# Patient Record
Sex: MALE | Race: WHITE | Employment: OTHER | URBAN - METROPOLITAN AREA
[De-identification: names, ages, dates, MRNs, and addresses within clinical notes are randomized per-mention and may not be internally consistent; named-entity substitution may affect disease eponyms.]

---

## 2017-01-04 ENCOUNTER — TRANSFERRED RECORDS (OUTPATIENT)
Dept: HEALTH INFORMATION MANAGEMENT | Facility: CLINIC | Age: 57
End: 2017-01-04

## 2017-01-27 ENCOUNTER — OFFICE VISIT (OUTPATIENT)
Dept: FAMILY MEDICINE | Facility: CLINIC | Age: 57
End: 2017-01-27
Payer: COMMERCIAL

## 2017-01-27 VITALS
SYSTOLIC BLOOD PRESSURE: 134 MMHG | WEIGHT: 213 LBS | DIASTOLIC BLOOD PRESSURE: 80 MMHG | TEMPERATURE: 98.2 F | HEART RATE: 81 BPM | HEIGHT: 71 IN | BODY MASS INDEX: 29.82 KG/M2 | OXYGEN SATURATION: 96 %

## 2017-01-27 DIAGNOSIS — Z71.89 ADVANCED DIRECTIVES, COUNSELING/DISCUSSION: ICD-10-CM

## 2017-01-27 DIAGNOSIS — G90.522 COMPLEX REGIONAL PAIN SYNDROME TYPE 1 OF LEFT LOWER EXTREMITY: Primary | ICD-10-CM

## 2017-01-27 PROCEDURE — 99214 OFFICE O/P EST MOD 30 MIN: CPT | Performed by: FAMILY MEDICINE

## 2017-01-27 RX ORDER — ATORVASTATIN CALCIUM 40 MG/1
TABLET, FILM COATED ORAL
COMMUNITY
Start: 2016-12-20 | End: 2017-05-24

## 2017-01-27 RX ORDER — LATANOPROST 50 UG/ML
SOLUTION/ DROPS OPHTHALMIC
COMMUNITY
Start: 2015-11-11

## 2017-01-27 RX ORDER — ALFUZOSIN HYDROCHLORIDE 10 MG/1
10 TABLET, EXTENDED RELEASE ORAL
COMMUNITY
Start: 2016-11-29 | End: 2017-10-11

## 2017-01-27 RX ORDER — VENLAFAXINE HYDROCHLORIDE 37.5 MG/1
CAPSULE, EXTENDED RELEASE ORAL
Qty: 46 CAPSULE | Refills: 1 | Status: SHIPPED | OUTPATIENT
Start: 2017-01-27 | End: 2017-03-07 | Stop reason: SINTOL

## 2017-01-27 ASSESSMENT — ANXIETY QUESTIONNAIRES
6. BECOMING EASILY ANNOYED OR IRRITABLE: NEARLY EVERY DAY
2. NOT BEING ABLE TO STOP OR CONTROL WORRYING: NEARLY EVERY DAY
7. FEELING AFRAID AS IF SOMETHING AWFUL MIGHT HAPPEN: NEARLY EVERY DAY
2. NOT BEING ABLE TO STOP OR CONTROL WORRYING: NEARLY EVERY DAY
5. BEING SO RESTLESS THAT IT IS HARD TO SIT STILL: NEARLY EVERY DAY
GAD7 TOTAL SCORE: 21
IF YOU CHECKED OFF ANY PROBLEMS ON THIS QUESTIONNAIRE, HOW DIFFICULT HAVE THESE PROBLEMS MADE IT FOR YOU TO DO YOUR WORK, TAKE CARE OF THINGS AT HOME, OR GET ALONG WITH OTHER PEOPLE: VERY DIFFICULT
5. BEING SO RESTLESS THAT IT IS HARD TO SIT STILL: NEARLY EVERY DAY
1. FEELING NERVOUS, ANXIOUS, OR ON EDGE: NEARLY EVERY DAY
6. BECOMING EASILY ANNOYED OR IRRITABLE: NEARLY EVERY DAY
1. FEELING NERVOUS, ANXIOUS, OR ON EDGE: NEARLY EVERY DAY
3. WORRYING TOO MUCH ABOUT DIFFERENT THINGS: NEARLY EVERY DAY
3. WORRYING TOO MUCH ABOUT DIFFERENT THINGS: NEARLY EVERY DAY
7. FEELING AFRAID AS IF SOMETHING AWFUL MIGHT HAPPEN: NEARLY EVERY DAY
GAD7 TOTAL SCORE: 21

## 2017-01-27 ASSESSMENT — PATIENT HEALTH QUESTIONNAIRE - PHQ9
5. POOR APPETITE OR OVEREATING: NEARLY EVERY DAY
5. POOR APPETITE OR OVEREATING: NEARLY EVERY DAY

## 2017-01-27 NOTE — PROGRESS NOTES
SUBJECTIVE:                                                    Mata Poe is a 57 year old male who presents to clinic today for the following health issues:      Musculoskeletal problem/pain      Duration: 18 months -diagnosed RSD in 2005 following a surgery for Gee's neuroma.    Description  Location: left foot, ankle and lower leg. Has two pins in the ankle sill.  Entire foot and goes up back of leg.      Intensity:  severe    Accompanying signs and symptoms: radiation of pain to lower leg, swelling has improved but does get red, painful and tingly     History  Previous similar problem: YES- 2005  Previous evaluation:  Saw podiatrist 12/14/16 and had cryo surgery x2.  The most recent cyro surgery was 12/30 and since then pt c/o swelling, redness, burning and tingling    Precipitating or alleviating factors:  Trauma or overuse: YES- over use  Aggravating factors include: standing, walking, climbing stairs and overuse    Therapies tried and outcome: cryo -no improvement had seen podiatry on and off for years.  Has had sympathectomy as well without much relief.  Seemed worse after precedure.  Now pins and needles.   Walking is hard.  Cannot sleep.  Very frustrated.       Patient Active Problem List   Diagnosis     Causalgia of lower limb     Allergic rhinitis     Right Upper Lobe Changes     24 hr info given     Hyperlipidemia LDL goal <130  The 10-year ASCVD risk score ([place] DC Jr., et al., 2013) is: 9.2%    Values used to calculate the score:      Age: 57 years      Sex: Male      Is Non- : No      Diabetic: No      Tobacco smoker: No      Systolic Blood Pressure: 130 mmHg      Is Prescribed Antihypertensives: No      HDL Cholesterol: 33 mg/dL      Total Cholesterol: 179 mg/dL      Elevated fasting glucose     Obstructive Sleep Apnea (severe AHI=70)      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     Problem list and histories reviewed &  "adjusted, as indicated.  Additional history: as documented    Problem list, Medication list, Allergies, and Medical/Social/Surgical histories reviewed in EPIC and updated as appropriate.    1. Complex regional pain syndrome type 1 of left lower extremity    2. Advanced directives, counseling/discussion      PHQ-9 SCORE 1/27/2017 1/27/2017   Total Score 13 13     JACOB-7 SCORE 1/27/2017 1/27/2017   Total Score 21 21       Please see flow-sheet for specific details.     PMH: Updated and/or reviewed in chart.    PSH: Updated and/or reviewed in chart.    Family History: Updated and/or reviewed in chart.     ROS:  Constitutional, HEENT, cardiovascular, pulmonary, gi and gu systems are otherwise negative.    OBJECTIVE:                                                    /80  Pulse 81  Temp 98.2  F (36.8  C) (Tympanic)  Ht 5' 11\" (1.803 m)  Wt 213 lb (96.6 kg)  SpO2 96%  BMI 29.71 kg/m2  GENERAL: Pleasant and interactive.  Alert and oriented x 3.  No acute distress.  FEET: dominik sides very tender to light touch on entire foot and back of leg.  ; no swelling.   skin or vascular lesions.  Sensation is intact. Peripheral pulses are palpable. Toenails are normal.  PSYCH: Alert and oriented times 3; coherent speech, normal rate and volume, able to articulate logical thoughts, able to abstract reason, no tangential thoughts, no hallucinations or delusions  His affect is normal. Describes some anxiety.      Results for orders placed or performed during the hospital encounter of 08/11/14   Ribs XR, unilat 3 views + PA chest,  left    Addendum: 8/11/2014    Original dictation by Dr. Delcid. There is a left eighth rib  nondisplaced fracture. I spoke with Dr. Ruby regarding this finding  at 9 AM.    AWA CORCORAN MD      Narrative    XR RIBS & CHEST LT 3VW  8/11/2014 8:30 AM     HISTORY:  Fall at work, left posterior lateral chest wall trauma.    COMPARISON: None.    FINDINGS: No evidence for acute fracture.    LENKA DELCID, " MD      ASSESSMENT/PLAN:                                                        ICD-10-CM    1. Complex regional pain syndrome type 1 of left lower extremity G90.522 [facility] ([place] REFERRAL     venlafaxine (EFFEXOR-XR) 37.5 MG 24 hr capsule   2. Advanced directives, counseling/discussion Z71.89 [facility] REFERRAL       Care plan updated in chart for chronic problems.    Patient Instructions   Pain clinic.  Start on venlefaxine as instructed.  Follow-up with alta on 3/15/17.     Orders Placed This Encounter     [facility] REFERR[facility]     atorvastatin (LIPITOR) 40 MG tablet     alfuzosin (UROXATRAL) 10 MG 24 hr tablet     latanoprost (XALATAN) 0.005 % ophthalmic solution      Goals     None           Mariusz Ordonez MD

## 2017-01-27 NOTE — MR AVS SNAPSHOT
After Visit Summary   1/27/2017    Mata Poe    MRN: [id number]           Patient Information     Date Of Birth          1960        Visit Information        Provider Department      1/27/2017 3:30 PM Mariusz Ordonez MD [facility]        Today's Diagnoses     Complex regional pain syndrome type 1 of left lower extremity    -  1     Advanced directives, counseling/discussion           Care Instructions    Pain clinic.  Start on venlefaxine as instructed.  Follow-up with alta on 3/15/17.        Follow-ups after your visit        Additional Services     [facility] REFERRAL       Your provider has referred you to Advance Directive Counseling with our [facility].    Reason for Referral: Facilitate General Advance Care Planning    The [facility] Representative will call you to schedule your appointment, unless your appointment was already scheduled at your clinic.  You may also call the [facility] Representative at [phone] to schedule your appointment.            [facility] ([place]) REFERRAL       Your provider has referred you to the [facility].    Reason for Referral: Comprehensive Evaluation and Management    Please complete the following questions:    What is your diagnosis for the patient's pain?  Complex regional pan syndrome.     Do you have any specific questions for the pain specialist? Yes: candidate for repeat sympathectomy or block?      Are there any red flags that may impact the assessment or management of the patient? None    **ANY DIAGNOSTIC TESTS THAT ARE NOT IN EPIC SHOULD BE SENT TO THE PAIN CENTER**    Please note the Pre-Op Pain Consult must be scheduled 2-3 weeks prior to the patient's surgery.  Patient's trying to schedule within 2 weeks of surgery may not be accommodated.     Pre-Op Pain Consults are only good for 30 days.    REGARDING OPIOID MEDICATIONS:  We will always  address appropriateness of opioid pain medications, but we generally will not automatically take on a prescribing role. When we do take on prescribing of opioids for chronic pain, it is in collaboration with the referring physician for an intermediate period of time (months), with an expectation that the primary physician or provider will assume the prescribing role if medications are effective at stable doses with demonstrated compliance.  Therefore, please do not assume that your prescribing responsibilities end on the day of pain clinic consultation.  Is this agreeable to you? YES    For any questions, contact the [facility] at [phone].    Please be aware that coverage of these services is subject to the terms and limitations of your health insurance plan.  Call member services at your health plan with any benefit or coverage questions.      Please bring the following with you to your appointment:    (1) Any X-Rays, CTs or MRIs which have been performed.  Contact the facility where they were done to arrange for  prior to your scheduled appointment.    (2) List of current medications   (3) This referral request   (4) Any documents/labs given to you for this referral                  Who to contact     Normal or non-critical lab and imaging results will be communicated to you by MyChart, letter or phone within 4 business days after the clinic has received the results. If you do not hear from us within 7 days, please contact the clinic through [employer] or phone. If you have a critical or abnormal lab result, we will notify you by phone as soon as possible.  Submit refill requests through [employer] or call your pharmacy and they will forward the refill request to us. Please allow 3 business days for your refill to be completed.          If you need to speak with a  for additional information , please call: [phone]           Additional Information About Your  "Visit        MyChart Information     [facility] gives you secure access to your electronic health record. If you see a primary care provider, you can also send messages to your care team and make appointments. If you have questions, please call your primary care clinic.  If you do not have a primary care provider, please call [phone] and they will assist you.        Care EveryWhere ID     This is your Care EveryWhere ID. This could be used by other organizations to access your [facility] medical records  [id number]        Your Vitals Were     Pulse Temperature Height BMI (Body Mass Index) Pulse Oximetry       81 98.2  F (36.8  C) (Tympanic) 5' 11\" (1.803 m) 29.72 kg/m2 96%        Blood Pressure from Last 3 Encounters:   01/27/17 134/80   10/29/14 112/74   08/21/14 118/74    Weight from Last 3 Encounters:   01/27/17 213 lb (96.616 kg)   10/29/14 216 lb 3.2 oz (98.068 kg)   08/21/14 213 lb 12.8 oz (96.979 kg)              We Performed the Following     [facility] REFERRAL     [facility] ([place] REFERRAL          Today's Medication Changes          These changes are accurate as of: 1/27/17  4:40 PM.  If you have any questions, ask your nurse or doctor.               Start taking these medicines.        Dose/Directions    venlafaxine 37.5 MG 24 hr capsule   Commonly known as:  EFFEXOR-XR   Used for:  Complex regional pain syndrome type 1 of left lower extremity   Started by:  Mariusz Ordonez MD        Take 1 capsule daily for 14 days, then take 2 capsules daily.   Quantity:  46 capsule   Refills:  1            Where to get your medicines      These medications were sent to [facility] [address]  [address] [address]     Phone:  [phone]    - venlafaxine 37.5 MG 24 hr capsule             Primary Care Provider Office Phone # Fax #    Maribel Lancaster [phone]       [place] [employer] [address] [place] [address]        Thank you!     Thank you for choosing [facility] [place]  for your care. Our goal is always to provide you with excellent care. Hearing back from our patients is one way we can continue to improve our services. Please take a few minutes to complete the written survey that you may receive in the mail after your visit with us. Thank you!             Your Updated Medication List - Protect others around you: Learn how to safely use, store and throw away your medicines at www.disposemymeds.org.          This list is accurate as of: 1/27/17  4:40 PM.  Always use your most recent med list.                   Brand Name Dispense Instructions for use    alfuzosin 10 MG 24 hr tablet    UROXATRAL     Take 10 mg by mouth       ASPIRIN EC PO      Take 81 mg by mouth daily       atorvastatin 40 MG tablet    LIPITOR         latanoprost 0.005 % ophthalmic solution    XALATAN         order for [facility]      Respironics REMSTAR 60 Series Auto CPAP 7cm H2O, Airfit P10 nasal pillow mask w/medium pillows       traZODone 150 MG tablet    DESYREL    30 tablet    Take 1 tablet (150 mg) by mouth nightly as needed for sleep Needs appointment for further refills.       venlafaxine 37.5 MG 24 hr capsule    EFFEXOR-XR    46 capsule    Take 1 capsule daily for 14 days, then take 2 capsules daily.

## 2017-01-27 NOTE — NURSING NOTE
"Initial /80 mmHg  Pulse 81  Temp(Src) 98.2  F (36.8  C) (Tympanic)  Ht 5' 11\" (1.803 m)  Wt 213 lb (96.616 kg)  BMI 29.72 kg/m2  SpO2 96% Estimated body mass index is 29.72 kg/(m^2) as calculated from the following:    Height as of this encounter: 5' 11\" (1.803 m).    Weight as of this encounter: 213 lb (96.616 kg). .    "

## 2017-01-28 ASSESSMENT — PATIENT HEALTH QUESTIONNAIRE - PHQ9: SUM OF ALL RESPONSES TO PHQ QUESTIONS 1-9: 13

## 2017-01-28 ASSESSMENT — ANXIETY QUESTIONNAIRES: GAD7 TOTAL SCORE: 21

## 2017-01-30 ENCOUNTER — TRANSFERRED RECORDS (OUTPATIENT)
Dept: HEALTH INFORMATION MANAGEMENT | Facility: CLINIC | Age: 57
End: 2017-01-30

## 2017-01-30 ENCOUNTER — TELEPHONE (OUTPATIENT)
Dept: PALLIATIVE MEDICINE | Facility: CLINIC | Age: 57
End: 2017-01-30

## 2017-01-30 NOTE — Clinical Note
February 6, 2017    Mata Poe  [address] [place] [id number]    Dear Mata                                                                 Welcome to the [facility].  We are located on the 2nd floor ([address]) of the [facility], located at [address] [place] [address].    Your appointment at the [facility] has been scheduled on March 7th at 10:00am with Jenna Parrish NP.    At your first visit, you will meet your team of caregivers who will help you to develop pain management strategies that will last a lifetime. You will meet with our support staff to review your insurance information, and collect your co-payment if required by your insurance company. You will also meet with a medical pain specialist and care coordinator who will assess your pain and develop a plan of care for your successful pain rehabilitation. You should expect to spend 1-2 hours at your first visit with us. Usually, patients work with us for a period of 6-12 months, and eventually return to their primary doctor once their pain management has stabilized.      To help us make your visit go as smoothly as possible, please bring the following items with you on your visit:     Completed Pain Questionnaire enclosed in this packet.  If you do not bring the completed questionnaire, we may have to reschedule your appointment.  List of any medicines that you are currently taking or have been prescribed  Important NON-[facility] medical information such as medical records or tests results (X-rays, or laboratory tests)  Your health insurance card  Financial resources to cover your co-payment or balance due at the time of service (cash, personal check, Visa, and MasterCard are acceptable methods of payment)     Due to the demand for new patient evaluations, you must notify the scheduling department 48 hours in advance if you are not able to keep this appointment.  Failure to do so could affect your ability to reschedule with our clinic. Please be aware that we will not prescribe any medications at your first visit.     Please call [phone] with any questions regarding your appointment. We look forward to meeting you and working to address your health care needs.     Sincerely,    [facility]

## 2017-02-06 NOTE — TELEPHONE ENCOUNTER
Pain Management Center Referral      1. Confirmed address with patient? Yes  2. Confirmed phone number with patient? Yes  3. Confirmed referring provider? Yes  4. Is the PCP the same as the referring provider? Yes  5. Has the patient been to any previous pain clinics? No  (If yes, send DEBBIE with welcome letter)  6. Which insurance are we to bill for this appointment?  BCBS    7. Informed pt of cancellation (48 hour) policy? Yes    REGARDING OPIOID MEDICATIONS: We will always address appropriateness of opioid pain medications, but we generally will not automatically take on a prescribing role. When we do take on prescribing of opioids for chronic pain, it is in collaboration with the referring physician for an intermediate period of time (months), with an expectation that the primary physician or provider will assume the prescribing role if medications are effective at stable doses with demonstrated compliance. Therefore, please do not assume that your prescribing responsibilities end on the day of pain clinic consultation.  7. Informed pt of prescribing policy? Yes      8. Referring Provider: Maribel Lancaster

## 2017-02-09 ENCOUNTER — TELEPHONE (OUTPATIENT)
Dept: FAMILY MEDICINE | Facility: CLINIC | Age: 57
End: 2017-02-09

## 2017-02-09 NOTE — TELEPHONE ENCOUNTER
Pt is calling and states that he wants to talk to a nurse re His leg and hip pain, he was seen on 1/27/17 for this, and he states its not getting better. Please triage.     Shahla In[place], [place]

## 2017-02-10 ENCOUNTER — OFFICE VISIT (OUTPATIENT)
Dept: ORTHOPEDICS | Facility: CLINIC | Age: 57
End: 2017-02-10
Payer: COMMERCIAL

## 2017-02-10 ENCOUNTER — RADIANT APPOINTMENT (OUTPATIENT)
Dept: GENERAL RADIOLOGY | Facility: CLINIC | Age: 57
End: 2017-02-10
Attending: FAMILY MEDICINE
Payer: COMMERCIAL

## 2017-02-10 VITALS
HEIGHT: 71 IN | BODY MASS INDEX: 29.82 KG/M2 | DIASTOLIC BLOOD PRESSURE: 80 MMHG | SYSTOLIC BLOOD PRESSURE: 130 MMHG | WEIGHT: 213 LBS

## 2017-02-10 DIAGNOSIS — M25.511 CHRONIC RIGHT SHOULDER PAIN: ICD-10-CM

## 2017-02-10 DIAGNOSIS — G89.29 CHRONIC RIGHT SHOULDER PAIN: ICD-10-CM

## 2017-02-10 DIAGNOSIS — S43.109A: ICD-10-CM

## 2017-02-10 DIAGNOSIS — M79.661 PAIN OF RIGHT LOWER LEG: ICD-10-CM

## 2017-02-10 DIAGNOSIS — M79.661 PAIN OF RIGHT LOWER LEG: Primary | ICD-10-CM

## 2017-02-10 DIAGNOSIS — G90.522 COMPLEX REGIONAL PAIN SYNDROME TYPE 1 OF LEFT LOWER EXTREMITY: ICD-10-CM

## 2017-02-10 PROCEDURE — 99204 OFFICE O/P NEW MOD 45 MIN: CPT | Performed by: FAMILY MEDICINE

## 2017-02-10 PROCEDURE — 73610 X-RAY EXAM OF ANKLE: CPT | Mod: RT

## 2017-02-10 PROCEDURE — 73562 X-RAY EXAM OF KNEE 3: CPT | Mod: RT

## 2017-02-10 PROCEDURE — 73030 X-RAY EXAM OF SHOULDER: CPT | Mod: RT

## 2017-02-10 RX ORDER — GABAPENTIN 300 MG/1
300-600 CAPSULE ORAL AT BEDTIME
Qty: 90 CAPSULE | Refills: 1 | Status: SHIPPED | OUTPATIENT
Start: 2017-02-10 | End: 2017-03-15 | Stop reason: ALTCHOICE
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
## 2017-02-17 PROBLEM — F41.8 MIXED ANXIETY DEPRESSIVE DISORDER: Status: ACTIVE | Noted: 2017-02-17

## 2017-03-07 ENCOUNTER — OFFICE VISIT (OUTPATIENT)
Dept: PALLIATIVE MEDICINE | Facility: CLINIC | Age: 57
End: 2017-03-07
Payer: COMMERCIAL

## 2017-03-07 VITALS
DIASTOLIC BLOOD PRESSURE: 77 MMHG | HEART RATE: 74 BPM | BODY MASS INDEX: 30.94 KG/M2 | WEIGHT: 221 LBS | HEIGHT: 71 IN | SYSTOLIC BLOOD PRESSURE: 122 MMHG

## 2017-03-07 DIAGNOSIS — G89.29 CHRONIC BILATERAL LOW BACK PAIN WITHOUT SCIATICA: ICD-10-CM

## 2017-03-07 DIAGNOSIS — G90.522 COMPLEX REGIONAL PAIN SYNDROME TYPE 1 OF LEFT LOWER EXTREMITY: Primary | ICD-10-CM

## 2017-03-07 DIAGNOSIS — M25.50 MULTIPLE JOINT PAIN: ICD-10-CM

## 2017-03-07 DIAGNOSIS — G89.29 CHRONIC PAIN OF LEFT ANKLE: ICD-10-CM

## 2017-03-07 DIAGNOSIS — M79.18 MYOFASCIAL PAIN: ICD-10-CM

## 2017-03-07 DIAGNOSIS — M25.572 CHRONIC PAIN OF LEFT ANKLE: ICD-10-CM

## 2017-03-07 DIAGNOSIS — M54.50 CHRONIC BILATERAL LOW BACK PAIN WITHOUT SCIATICA: ICD-10-CM

## 2017-03-07 PROCEDURE — 99205 OFFICE O/P NEW HI 60 MIN: CPT | Performed by: NURSE PRACTITIONER

## 2017-03-07 RX ORDER — PREGABALIN 25 MG/1
CAPSULE ORAL
Qty: 90 CAPSULE | Refills: 0 | Status: SHIPPED | OUTPATIENT
Start: 2017-03-07 | End: 2017-04-14

## 2017-03-07 ASSESSMENT — PAIN SCALES - GENERAL: PAINLEVEL: WORST PAIN (10)

## 2017-03-07 NOTE — MR AVS SNAPSHOT
After Visit Summary   3/7/2017    Mata Poe    MRN: [id number]           Patient Information     Date Of Birth          1960        Visit Information        Provider Department      3/7/2017 10:00 AM Jenna Parrish APRN [facility]        Today's Diagnoses     Complex regional pain syndrome type 1 of left lower extremity    -  1    Chronic pain of left ankle        Multiple joint pain        Myofascial pain        Chronic bilateral low back pain without sciatica          Care Instructions    PLAN:  1. Schedule with pain management PHYSICAL THERAPY   2. Consider HEALTH PSYCHOLOGY   3. Medications:   1. Start Lyrica as prescribed, stop gabapentin the day before you begin Lyrica  2. I don't recommend opiates for this chronic pain  4. Procedures: schedule left lumbar sympathetic nerve block, our office will call you  5. Follow-up with me in 8 weeks.      ----------------------------------------------------------------  Nurse Triage line:  [phone]   Call this number with any questions or concerns. You may leave a detailed message anytime. Calls are typically returned Monday through Friday between 8 AM and 4:30 PM. We usually get back to you within 2 business days depending on the issue/request.       Medication refills:    For non-narcotic medications, call your pharmacy directly to request a refill. The pharmacy will contact the [facility] for authorization. Please allow 3-4 days for these refills to be processed.     For narcotic refills, call the nurse triage line or send a [employer] message. Please contact us 7-10 days before your refill is due. The message MUST include the name of the specific medication(s) requested and how you would like to receive the prescription(s). The options are as follows:    Pain Clinic staff can mail the prescription to your pharmacy. Please tell us the name of the pharmacy.    You may pick the prescription up at the Pain  Clinic (tell us the location) or during a clinic visit with your pain provider    Pain Clinic staff can deliver the prescription to the [place] pharmacy in the clinic building. Please tell us the location.      Scheduling number: [phone].  Call this number to schedule or change appointments.    We believe regular attendance is key to your success in our program.    Any time you are unable to keep your appointment we ask that you call us at least 24 hours in advance to let us know. This will allow us to offer the appointment time to another patient.             Follow-ups after your visit        Additional Services     PAIN INJECTION EVAL/TREAT/FOLLOW UP           PAIN PT EVAL AND TREAT                 Your next 10 appointments already scheduled     Mar 15, 2017 11:00 AM CDT   Office Visit with Mariusz Ordonez MD   [facility] [address] ([facility] [address])    [address] [address] [phone]           Bring a current list of meds and any records pertaining to this visit.  For Physicals, please bring immunization records and any forms needing to be filled out.  Please arrive 10 minutes early to complete paperwork.              Who to contact     If you have questions or need follow up information about today's clinic visit or your schedule please contact [facility] directly at [phone].  Normal or non-critical lab and imaging results will be communicated to you by MyChart, letter or phone within 4 business days after the clinic has received the results. If you do not hear from us within 7 days, please contact the clinic through MyChart or phone. If you have a critical or abnormal lab result, we will notify you by phone as soon as possible.  Submit refill requests through [employer] or call your pharmacy and they will forward the refill request to us. Please allow 3 business days for your refill to be completed.          Additional Information About Your  "Visit        [facility] [place] Information     [employer] gives you secure access to your electronic health record. If you see a primary care provider, you can also send messages to your care team and make appointments. If you have questions, please call your primary care clinic.  If you do not have a primary care provider, please call [phone] and they will assist you.        Care EveryWhere ID     This is your Care EveryWhere ID. This could be used by other organizations to access your [facility] medical records  [id number]        Your Vitals Were     Pulse Height BMI (Body Mass Index)             74 1.803 m (5' 11\") 30.82 kg/m2          Blood Pressure from Last 3 Encounters:   03/07/17 122/77   02/10/17 130/80   01/27/17 134/80    Weight from Last 3 Encounters:   03/07/17 100.2 kg (221 lb)   02/10/17 96.6 kg (213 lb)   01/27/17 96.6 kg (213 lb)              We Performed the Following     PAIN INJECTION EVAL/TREAT/FOLLOW UP     PAIN PT EVAL AND TREAT          Today's Medication Changes          These changes are accurate as of: 3/7/17 11:24 AM.  If you have any questions, ask your nurse or doctor.               Start taking these medicines.        Dose/Directions    pregabalin 25 MG capsule   Commonly known as:  LYRICA   Used for:  Complex regional pain syndrome type 1 of left lower extremity, Chronic pain of left ankle, Multiple joint pain, Myofascial pain, Chronic bilateral low back pain without sciatica        Take 25mg at bedtime for 7 days, then take 25mg twice daily for 7 days, then take 25mg in the morning and 50mg at bedtime. Stop gabapentin the day before you start Lyrica   Quantity:  90 capsule   Refills:  0         Stop taking these medicines if you haven't already. Please contact your care team if you have questions.     venlafaxine 37.5 MG 24 hr capsule   Commonly known as:  EFFEXOR-XR                Where to get your medicines      Some of these will need a paper prescription and others can be bought over the " counter.  Ask your nurse if you have questions.     Bring a paper prescription for each of these medications     pregabalin 25 MG capsule                Primary Care Provider Office Phone # Fax #    Maribel Lancaster [phone]       [place] [facility] [address] [place] [address]        Thank you!     Thank you for choosing [facility]  for your care. Our goal is always to provide you with excellent care. Hearing back from our patients is one way we can continue to improve our services. Please take a few minutes to complete the written survey that you may receive in the mail after your visit with us. Thank you!             Your Updated Medication List - Protect others around you: Learn how to safely use, store and throw away your medicines at www.disposemymeds.org.          This list is accurate as of: 3/7/17 11:24 AM.  Always use your most recent med list.                   Brand Name Dispense Instructions for use    alfuzosin 10 MG 24 hr tablet    UROXATRAL     Take 10 mg by mouth       ASPIRIN EC PO      Take 81 mg by mouth daily       atorvastatin 40 MG tablet    LIPITOR         gabapentin 300 MG capsule    NEURONTIN    90 capsule    Take 1-2 capsules (300-600 mg) by mouth At Bedtime Take one capsule at night for up to 3 days before adding a second capsule       latanoprost 0.005 % ophthalmic solution    XALATAN         order for [facility]      Respironics REMSTAR 60 Series Auto CPAP 7cm H2O, Airfit P10 nasal pillow mask w/medium pillows       pregabalin 25 MG capsule    LYRICA    90 capsule    Take 25mg at bedtime for 7 days, then take 25mg twice daily for 7 days, then take 25mg in the morning and 50mg at bedtime. Stop gabapentin the day before you start Lyrica       sertraline 50 MG tablet    ZOLOFT    30 tablet    Take 1/2 tablet (25 mg) for 1-2 weeks, then increase to 1 tablet orally daily       traZODone 150 MG tablet    DESYREL    30 tablet    Take 1 tablet (150 mg) by  mouth nightly as needed for sleep Needs appointment for further refills.

## 2017-03-07 NOTE — PATIENT INSTRUCTIONS
PLAN:  1. Schedule with pain management PHYSICAL THERAPY   2. Consider HEALTH PSYCHOLOGY   3. Medications:   1. Start Lyrica as prescribed, stop gabapentin the day before you begin Lyrica  2. I don't recommend opiates for this chronic pain  4. Procedures: schedule left lumbar sympathetic nerve block, our office will call you  5. Follow-up with me in 8 weeks.      ----------------------------------------------------------------  Nurse Triage line:  [phone]   Call this number with any questions or concerns. You may leave a detailed message anytime. Calls are typically returned Monday through Friday between 8 AM and 4:30 PM. We usually get back to you within 2 business days depending on the issue/request.       Medication refills:    For non-narcotic medications, call your pharmacy directly to request a refill. The pharmacy will contact the [facility] Management Center for authorization. Please allow 3-4 days for these refills to be processed.     For narcotic refills, call the nurse triage line or send a [employer] message. Please contact us 7-10 days before your refill is due. The message MUST include the name of the specific medication(s) requested and how you would like to receive the prescription(s). The options are as follows:    Pain Clinic staff can mail the prescription to your pharmacy. Please tell us the name of the pharmacy.    You may pick the prescription up at the Pain Clinic (tell us the location) or during a clinic visit with your pain provider    Pain Clinic staff can deliver the prescription to the [place] pharmacy in the clinic building. Please tell us the location.      Scheduling number: [phone].  Call this number to schedule or change appointments.    We believe regular attendance is key to your success in our program.    Any time you are unable to keep your appointment we ask that you call us at least 24 hours in advance to let us know. This will allow us to offer the appointment time to  another patient.
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
## 2017-03-08 ENCOUNTER — TELEPHONE (OUTPATIENT)
Dept: PALLIATIVE MEDICINE | Facility: CLINIC | Age: 57
End: 2017-03-08

## 2017-03-08 ASSESSMENT — PATIENT HEALTH QUESTIONNAIRE - PHQ9: SUM OF ALL RESPONSES TO PHQ QUESTIONS 1-9: 11

## 2017-03-08 NOTE — TELEPHONE ENCOUNTER
Called patient to schedule LSNB and patient would like to think about it before scheduling.     Mana Durand    [facility]

## 2017-03-13 NOTE — TELEPHONE ENCOUNTER
Pre-screening questions for Radiology Injections:    Injection to be done at which interventional clinic site? [facility]    Procedure ordered by Jenna Parrish    Procedure ordered? Lumbar Sympathetic Nerve Block    What insurance would patient like us to bill for this procedure? BCBS      Worker's comp-Any injection DO NOT SCHEDULE and route to Ibeth Torrez.      [employer] insurance - If scheduling an SI joint injection DO NOT SCHEDULE and route Ibeth Torrez.    [employer]- MBB's must be scheduled at LEAST two weeks apart      [employer] - Any injection besides hip/shoulder/knee joint DO NOT SCHEDULE and route to Ibeth Torrez. She will obtain PA and call pt back to schedule procedure or notify pt of denial.     Is an  needed? No     Patient has a drive home? (mandatory) YES:      Is patient taking any blood thinners (plavix, coumadin, jantoven, warfarin, heparin, pradaxa or dabigatran )? No   (If so, do not schedule, contact RN and/or MD)     Is patient taking any aspirin products? Yes - Pt takes 81mg daily; instructed to hold 6 day(s) prior to procedure.    (If more than 325mg/day do not schedule; Contact RN/MD. For all non-cervical interventional procedures if patient is taking MORE than 325mg/day, limit aspirin to 81-325mg/day x 1 week. No hold required day of procedure.  For CERVICAL procedures, hold all aspirin products for 6 days.)      Does the patient have a bleeding or clotting disorder? No   (If yes, okay to schedule, but contact RN/MD).  **For any patients with platelet count <100, must be forwarded to provider**    Is patient diabetic?  No  If YES, have them bring their glucometer.    Does patient have an active infection or treated for one within the past week? No     Is patient currently taking any antibiotics?  No   For patients on chronic, preventative, or prophylactic antibiotics, procedures can be scheduled.   For patients on antibiotics for active  or recent infection:  Mildred Vazquez [place], [place]-antibiotic course must have been completed for 4 days  Ophelia Rivas-antibiotic course must have been completed for 7 days    Is patient currently taking any steroid medications? (i.e. Prednisone, Medrol)  No   For patients on steroid medications:  Mildred Vazquez [place], [place]-steroid course must have been completed for 4 days  Ophelia Rivas-steroid course must have been completed for 7 days  Review with patient:  If you are started on any steroids or antibiotics between now and your appointment, you must contact us because it may affect our ability to perform your procedure Informed    Is patient actively being treated for cancer or immunocompromised, including the spleen having been removed? No  **For Dr. Keane patients without spleens should have the chart sent to her**  (If YES, do NOT schedule and route to RN)    Are you able to get on and off an exam table with minimal or no assistance? Yes  (If NO, do NOT schedule and route to RN)  Are you able to roll over and lay on your stomach with minimal or no assistance? Yes  (If NO, do NOT schedule and route to RN)         Any allergies to contrast dye, iodine, shellfish, or numbing and steroid medications? No  (If so, inform nursing and note in scheduling comments.)    Allergies: No known allergies      Any chance of pregnancy?Not Applicable    Has the patient had a flu shot or any other vaccinations within 7 days before or after the procedure.  No       Does patient have an MRI/CT?  Not Applicable  (SI joint, hip injections, lumbar sympathetic blocks, and stellate ganglion blocks do not require an MRI)    If so, was it done at [place]? No      If not, where was it done? N/A     Was the MRI done w/in the last 3 years?  NA   If MRI was not done at [place], [facility] or [facility] do NOT schedule. Route to nursing.  (If pt has disc the injection can be scheduled but pt has to bring  disc to appt. If they show up w/out disc the injection cannot be done)    Reminders (please tell patient if applicable):       Instructed pt to arrive 30 minutes early for IV start if this is for a cervical procedure, ALL sympathetic (stellate ganglion, hypogastric, or lumbar sympathetic block) and all sedation procedures (RFA, spinal cord stimulation trials).  YES: Informed    -IVs are not routinely placed for [place] and [place] cervical case       If NPO for sedation, informed patient that it is okay to take medications with sips of water (except if they are to hold blood thinners).  YES: Informed   *DO take blood pressure medication if it is prescribed*      If this is for a cervical ISABELLE, informed patient that aspirin needs to be held for 6 days.   YES: Informed      Do not schedule procedures requiring IV placement in the first appointment of the day or first appointment after lunch         For patients 85 or older we recommend having an adult stay w/ them for the remainder of the day.         Does the patient have any questions?  NO      Mana Durand  [facility]

## 2017-03-15 ENCOUNTER — OFFICE VISIT (OUTPATIENT)
Dept: FAMILY MEDICINE | Facility: CLINIC | Age: 57
End: 2017-03-15
Payer: COMMERCIAL

## 2017-03-15 VITALS
DIASTOLIC BLOOD PRESSURE: 78 MMHG | HEIGHT: 71 IN | WEIGHT: 217.8 LBS | TEMPERATURE: 97.2 F | HEART RATE: 80 BPM | SYSTOLIC BLOOD PRESSURE: 114 MMHG | BODY MASS INDEX: 30.49 KG/M2

## 2017-03-15 DIAGNOSIS — Z11.59 NEED FOR HEPATITIS C SCREENING TEST: ICD-10-CM

## 2017-03-15 DIAGNOSIS — Z13.220 LIPID SCREENING: ICD-10-CM

## 2017-03-15 DIAGNOSIS — G57.72 CAUSALGIA OF LOWER LIMB, LEFT: ICD-10-CM

## 2017-03-15 DIAGNOSIS — Z12.5 SCREENING FOR PROSTATE CANCER: ICD-10-CM

## 2017-03-15 DIAGNOSIS — E78.5 HYPERLIPIDEMIA LDL GOAL <130: Primary | ICD-10-CM

## 2017-03-15 DIAGNOSIS — F41.8 MIXED ANXIETY DEPRESSIVE DISORDER: ICD-10-CM

## 2017-03-15 PROCEDURE — 99214 OFFICE O/P EST MOD 30 MIN: CPT | Performed by: FAMILY MEDICINE

## 2017-03-15 RX ORDER — LORAZEPAM 1 MG/1
.5-1 TABLET ORAL EVERY 8 HOURS PRN
Qty: 30 TABLET | Refills: 0 | Status: SHIPPED | OUTPATIENT
Start: 2017-03-15 | End: 2017-04-12

## 2017-03-15 ASSESSMENT — ANXIETY QUESTIONNAIRES
2. NOT BEING ABLE TO STOP OR CONTROL WORRYING: NEARLY EVERY DAY
5. BEING SO RESTLESS THAT IT IS HARD TO SIT STILL: MORE THAN HALF THE DAYS
GAD7 TOTAL SCORE: 17
6. BECOMING EASILY ANNOYED OR IRRITABLE: SEVERAL DAYS
7. FEELING AFRAID AS IF SOMETHING AWFUL MIGHT HAPPEN: NEARLY EVERY DAY
1. FEELING NERVOUS, ANXIOUS, OR ON EDGE: NEARLY EVERY DAY
3. WORRYING TOO MUCH ABOUT DIFFERENT THINGS: NEARLY EVERY DAY

## 2017-03-15 ASSESSMENT — PATIENT HEALTH QUESTIONNAIRE - PHQ9: 5. POOR APPETITE OR OVEREATING: MORE THAN HALF THE DAYS

## 2017-03-15 NOTE — NURSING NOTE
"Chief Complaint   Patient presents with     Musculoskeletal Problem     Left foot pain/Lyrica/Neurontin follow up.      Anxiety     Zoloft follow up     Depression     Blood Draw     Would like Lipid and PSA drawn       Initial /78 (BP Location: Left arm, Patient Position: Chair, Cuff Size: Adult Large)  Pulse 80  Temp 97.2  F (36.2  C) (Tympanic)  Ht 5' 11\" (1.803 m)  Wt 217 lb 12.8 oz (98.8 kg)  BMI 30.38 kg/m2 Estimated body mass index is 30.38 kg/(m^2) as calculated from the following:    Height as of this encounter: 5' 11\" (1.803 m).    Weight as of this encounter: 217 lb 12.8 oz (98.8 kg).  Medication Reconciliation: complete   Danielle Zamora LPN    "

## 2017-03-15 NOTE — PROGRESS NOTES
SUBJECTIVE:                                                    Mata Poe is a 57 year old male who presents to clinic today for the following health issues:      Depression and Anxiety Follow-Up    Status since last visit: Worsened     Other associated symptoms: Foot and body pain, sleep issues    Complicating factors:     Significant life event: Yes-  Pain is getting worse     Current substance abuse: Alcohol - 8 drinks per week    Venlafaxine caused agitation and anxiety was worse.  Untilerable mood swings.    Zoloft change seems to be causing the same issues.      Poor sleep and lots of anxiety.  Keeping him awake at night.     Using trazodone at night.    Would like stop medications and talk to Félix, his therapist.     PAIN:    Didn't feel that neurontin helped.      Pain has his changing to Lyrica.    Scheduling an injection.    PHQ-9 SCORE 1/27/2017 3/7/2017 3/15/2017   Total Score 13 11 7     JACOB-7 SCORE 1/27/2017 1/27/2017 3/15/2017   Total Score 21 21 17        PHQ-9  English      PHQ-9   Any Language     GAD7       Amount of exercise or physical activity: 2-3 days/week for an average of 15 minutes    Problems taking medications regularly: No    Medication side effects: not sure  Diet: regular (no restrictions)        Problem list and histories reviewed & adjusted, as indicated.  Additional history: as documented    Labs reviewed in EPIC    Reviewed and updated as needed this visit by clinical staff  Tobacco  Allergies  Meds  Med Hx  Surg Hx  Fam Hx  Soc Hx      Reviewed and updated as needed this visit by Provider         1. Hyperlipidemia LDL goal <130 - due for follow-up.    2. Mixed anxiety depressive disorder    3. 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 Is very frustrated and debilitated by this.    4. Lipid screening - due for recheck on this.    5. Screening for prostate cancer - due for follow-up. Discussed screening.    6. Need for hepatitis C  "screening test        PMH: Updated and/or reviewed in chart.    PSH: Updated and/or reviewed in chart.    Family History: Updated and/or reviewed in chart.     ROS:  Constitutional, HEENT, cardiovascular, pulmonary, gi and gu systems are otherwise negative.    OBJECTIVE:                                                    /78 (BP Location: Left arm, Patient Position: Chair, Cuff Size: Adult Large)  Pulse 80  Temp 97.2  F (36.2  C) (Tympanic)  Ht 5' 11\" (1.803 m)  Wt 217 lb 12.8 oz (98.8 kg)  BMI 30.38 kg/m2  GENERAL: Pleasant and interactive.  Alert and oriented x 3.  No acute distress.  PSYCH: Alert. Cooperative.  Mildly agitated.  Mood is anxious and affect is consistant.  Denies suicidal or homicidal ideation.  HEENT: Normocephalic, atraumatic. PEERRLA, EOMI.  Scleras, lids and conjunctivae normal. Pinnas, canals and TM's clear.  Nose and oropharynx moist and clear.  NECK: supple and free of adenopathy or masses, the thyroid is normal without enlargement or nodules.  HEART:  S1 and S2 normal, no murmurs, clicks, gallops or rubs. Regular rate and rhythm.    PHQ-9 SCORE 1/27/2017 3/7/2017 3/15/2017   Total Score 13 11 7     JACOB-7 SCORE 1/27/2017 1/27/2017 3/15/2017   Total Score 21 21 17       Please see flow-sheet for specific details.   Results for orders placed or performed in visit on 02/10/17   XR Knee Bilateral 1/2 Views    Narrative    XR KNEE RT 1 /2 VW, XR KNEE BILATERAL 1/2 VW  2/10/2017 5:00 PM    HISTORY:  Pain in right lower leg    COMPARISON:  None.      Impression    IMPRESSION:  Bilateral AP and sunrise views. Lateral right knee. 5  images. No significant osseous degenerative changes. Mild bilateral  medial compartment narrowing. Tiny posterior superior right patellar  spur.     FLASH MORRIS MD      ASSESSMENT/PLAN:                                                        ICD-10-CM    1. Hyperlipidemia LDL goal <130 E78.5 Lipid Profile with reflex to direct LDL   2. Mixed anxiety depressive " disorder F41.8 LORazepam (ATIVAN) 1 MG tablet   3. Causalgia of lower limb, left G57.72 LORazepam (ATIVAN) 1 MG tablet   4. Lipid screening Z13.220 Lipid panel reflex to direct LDL   5. Screening for prostate cancer Z12.5 Prostate spec antigen screen   6. Need for hepatitis C screening test Z11.59 Hepatitis C Screen Reflex to HCV RNA Quant and Genotype       Care plan updated in chart for chronic problems.    Patient Instructions     Stop the zoloft.    Stop the gabapentin and start the Lyrica the next day as directed.    Ok to use trazodone for sleep.    See sleep tips below... If not helping with sleep, we can get an insomnia consult with the sleep clinic.    Will talk to Félix but will hold off on new psychiatric medications for now.    Follow-up with pain recommendations about the injection.     General recommendations for sleep problems (Insomnia)  Allow 2-4 weeks to see results   Establish a regular sleep schedule   Go to bed at same time each night   Get up at same time each day   Avoid sleeping-in on Sunday morning   Cut down time in bed (if not asleep, get up)   Avoid trying to force yourself to sleep   Use your bed only for sleep and sex   Do not read or watch Television in bed   Make the bedroom comfortable   Keep temperature in your bedroom comfortable   Keep bedroom quiet when sleeping   Consider ear plugs (silicon)   Keep bedroom dark enough   Use dark blinds or wear an eye mask if needed   Relax at bedtime   Relax each muscle group individually   Begin with your feet   Work toward your head   Deal with your worries before bedtime   Set aside a worry time for 30 minutes earlier   Listen to relaxation tapes   Classical Music or Nature sounds   Imagine a tranquil scene (e.g. waterfall or beach)   Back Massage   Gentle 5-minute back rub prior to bedtime   Perform measures to make you tired at bedtime   Get regular Exercise each day (6 hours before bedtime)   Take medications only as directed   Eat a light  "bedtime snack or warm drink   Warm milk   Warm herbal tea (non-caffeinated)   Special Therapy Measures   Sleep Restriction Therapy   Limit time in bed for 15 minutes more than sleep   Do not set limit under 4 hours   Increase time by 15 minutes per effective sleep trial   Effective sleep suggests >90% of bedtime asleep   Stimulus Control   Do not lie awake for more than 30 minutes   Get out of bed and perform a quiet activity   Return to bed when sleepy   Repeat as many times per night as needed   No Napping during day   Things to avoid   Do not Exercise just before bedtime   No overstimulating activities just before bed   No competitive games before bedtime   No exciting Television programs before bedtime   Avoid caffeine after lunchtime   Avoid chocolate   Avoid coffee, tea, or soda   Do not use alcohol to induce sleep (worsens Insomnia)   Do not take someone else's sleeping pills   Do not look at the clock when awakening   Do not turn on light when getting up to use bathroom   Read the book \"Say Good Night To Insomnia\"         Orders Placed This Encounter     Lipid Profile with reflex to direct LDL     Lipid panel reflex to direct LDL     Hepatitis C Screen Reflex to HCV RNA Quant and Genotype     Prostate spec antigen screen     LORazepam (ATIVAN) 1 MG tablet      Goals     None           Mariusz Ordonez MD      "

## 2017-03-15 NOTE — MR AVS SNAPSHOT
After Visit Summary   3/15/2017    Mata Poe    MRN: [id number]           Patient Information     Date Of Birth          1960        Visit Information        Provider Department      3/15/2017 11:00 AM Mariusz Ordonez MD [facility]        Today's Diagnoses     Hyperlipidemia LDL goal <130    -  1    Mixed anxiety depressive disorder        Causalgia of lower limb, left          Care Instructions      Stop the zoloft.    Stop the gabapentin and start the Lyrica the next day as directed.    Ok to use trazodone for sleep.    See sleep tips below... If not helping with sleep, we can get an insomnia consult with the sleep clinic.    Will talk to Félix but will hold off on new psychiatric medications for now.    Follow-up with pain recommendations about the injection.     General recommendations for sleep problems (Insomnia)  Allow 2-4 weeks to see results   Establish a regular sleep schedule   Go to bed at same time each night   Get up at same time each day   Avoid sleeping-in on Sunday morning   Cut down time in bed (if not asleep, get up)   Avoid trying to force yourself to sleep   Use your bed only for sleep and sex   Do not read or watch Television in bed   Make the bedroom comfortable   Keep temperature in your bedroom comfortable   Keep bedroom quiet when sleeping   Consider ear plugs (silicon)   Keep bedroom dark enough   Use dark blinds or wear an eye mask if needed   Relax at bedtime   Relax each muscle group individually   Begin with your feet   Work toward your head   Deal with your worries before bedtime   Set aside a worry time for 30 minutes earlier   Listen to relaxation tapes   Classical Music or Nature sounds   Imagine a tranquil scene (e.g. waterfall or beach)   Back Massage   Gentle 5-minute back rub prior to bedtime   Perform measures to make you tired at bedtime   Get regular Exercise each day (6 hours before bedtime)   Take medications only as  "directed   Eat a light bedtime snack or warm drink   Warm milk   Warm herbal tea (non-caffeinated)   Special Therapy Measures   Sleep Restriction Therapy   Limit time in bed for 15 minutes more than sleep   Do not set limit under 4 hours   Increase time by 15 minutes per effective sleep trial   Effective sleep suggests >90% of bedtime asleep   Stimulus Control   Do not lie awake for more than 30 minutes   Get out of bed and perform a quiet activity   Return to bed when sleepy   Repeat as many times per night as needed   No Napping during day   Things to avoid   Do not Exercise just before bedtime   No overstimulating activities just before bed   No competitive games before bedtime   No exciting Television programs before bedtime   Avoid caffeine after lunchtime   Avoid chocolate   Avoid coffee, tea, or soda   Do not use alcohol to induce sleep (worsens Insomnia)   Do not take someone else's sleeping pills   Do not look at the clock when awakening   Do not turn on light when getting up to use bathroom   Read the book \"Say Good Night To Insomnia\"            Follow-ups after your visit        Follow-up notes from your care team     Return in about 4 weeks (around 4/12/2017).      Your next 10 appointments already scheduled     Mar 20, 2017  1:00 PM CDT   New Visit with Harper Carrillo, PT   [facility] ([facility] [place])    [address]  [place] [address]   [phone]            Apr 11, 2017 10:15 AM CDT   Radiology Injections with Mariusz Dallas MD   [facility] ([facility])    [address]  [place] [address]   [phone]            May 16, 2017  1:30 PM CDT   Return Visit with Jenna Parrish APRN [facility] ([facility])    [address] [address]   [phone]              Future tests that were ordered for you today     Open Future Orders  " "      Priority Expected Expires Ordered    Lipid Profile with reflex to direct LDL Routine 3/15/2017 3/15/2018 3/15/2017            Who to contact     Normal or non-critical lab and imaging results will be communicated to you by [employer], letter or phone within 4 business days after the clinic has received the results. If you do not hear from us within 7 days, please contact the clinic through [employer] or phone. If you have a critical or abnormal lab result, we will notify you by phone as soon as possible.  Submit refill requests through [employer] or call your pharmacy and they will forward the refill request to us. Please allow 3 business days for your refill to be completed.          If you need to speak with a  for additional information , please call: [phone]           Additional Information About Your Visit        [employer] Information     [employer] gives you secure access to your electronic health record. If you see a primary care provider, you can also send messages to your care team and make appointments. If you have questions, please call your primary care clinic.  If you do not have a primary care provider, please call [phone] and they will assist you.        Care EveryWhere ID     This is your Care EveryWhere ID. This could be used by other organizations to access your [facility] medical records  [id number]        Your Vitals Were     Pulse Temperature Height BMI (Body Mass Index)          80 97.2  F (36.2  C) (Tympanic) 5' 11\" (1.803 m) 30.38 kg/m2         Blood Pressure from Last 3 Encounters:   03/15/17 114/78   03/07/17 122/77   02/10/17 130/80    Weight from Last 3 Encounters:   03/15/17 217 lb 12.8 oz (98.8 kg)   03/07/17 221 lb (100.2 kg)   02/10/17 213 lb (96.6 kg)                 Today's Medication Changes          These changes are accurate as of: 3/15/17 11:55 AM.  If you have any questions, ask your nurse or doctor.               Start taking these medicines.        " Dose/Directions    LORazepam 1 MG tablet   Commonly known as:  ATIVAN   Used for:  Causalgia of lower limb, left, Mixed anxiety depressive disorder   Started by:  Mariusz Ordonez MD        Dose:  0.5-1 mg   Take 0.5-1 tablets (0.5-1 mg) by mouth every 8 hours as needed for anxiety   Quantity:  30 tablet   Refills:  0         Stop taking these medicines if you haven't already. Please contact your care team if you have questions.     gabapentin 300 MG capsule   Commonly known as:  NEURONTIN   Stopped by:  Mariusz Ordonez MD           sertraline 50 MG tablet   Commonly known as:  ZOLOFT   Stopped by:  Mariusz Ordonez MD                Where to get your medicines      Some of these will need a paper prescription and others can be bought over the counter.  Ask your nurse if you have questions.     Bring a paper prescription for each of these medications     LORazepam 1 MG tablet                Primary Care Provider Office Phone # Fax #    Maribel [place] [phone]       [facility] [address]   [facility] [facility] [address]        Thank you!     Thank you for choosing [facility]  for your care. Our goal is always to provide you with excellent care. Hearing back from our patients is one way we can continue to improve our services. Please take a few minutes to complete the written survey that you may receive in the mail after your visit with us. Thank you!             Your Updated Medication List - Protect others around you: Learn how to safely use, store and throw away your medicines at www.disposemymeds.org.          This list is accurate as of: 3/15/17 11:55 AM.  Always use your most recent med list.                   Brand Name Dispense Instructions for use    alfuzosin 10 MG 24 hr tablet    UROXATRAL     Take 10 mg by mouth       ASPIRIN EC PO      Take 81 mg by mouth daily       atorvastatin 40 MG tablet    LIPITOR         latanoprost 0.005 % ophthalmic solution     XALATAN         LORazepam 1 MG tablet    ATIVAN    30 tablet    Take 0.5-1 tablets (0.5-1 mg) by mouth every 8 hours as needed for anxiety       order for [facility]      Respironics REMSTAR 60 Series Auto CPAP 7cm H2O, Airfit P10 nasal pillow mask w/medium pillows       pregabalin 25 MG capsule    LYRICA    90 capsule    Take 25mg at bedtime for 7 days, then take 25mg twice daily for 7 days, then take 25mg in the morning and 50mg at bedtime. Stop gabapentin the day before you start Lyrica       traZODone 150 MG tablet    DESYREL    30 tablet    Take 1 tablet (150 mg) by mouth nightly as needed for sleep Needs appointment for further refills.

## 2017-03-15 NOTE — PATIENT INSTRUCTIONS
Stop the zoloft.    Stop the gabapentin and start the Lyrica the next day as directed.    Ok to use trazodone for sleep.    See sleep tips below... If not helping with sleep, we can get an insomnia consult with the sleep clinic.    Will talk to Félix but will hold off on new psychiatric medications for now.    Follow-up with pain recommendations about the injection.     General recommendations for sleep problems (Insomnia)  Allow 2-4 weeks to see results   Establish a regular sleep schedule   Go to bed at same time each night   Get up at same time each day   Avoid sleeping-in on Sunday morning   Cut down time in bed (if not asleep, get up)   Avoid trying to force yourself to sleep   Use your bed only for sleep and sex   Do not read or watch Television in bed   Make the bedroom comfortable   Keep temperature in your bedroom comfortable   Keep bedroom quiet when sleeping   Consider ear plugs (silicon)   Keep bedroom dark enough   Use dark blinds or wear an eye mask if needed   Relax at bedtime   Relax each muscle group individually   Begin with your feet   Work toward your head   Deal with your worries before bedtime   Set aside a worry time for 30 minutes earlier   Listen to relaxation tapes   Classical Music or Nature sounds   Imagine a tranquil scene (e.g. waterfall or beach)   Back Massage   Gentle 5-minute back rub prior to bedtime   Perform measures to make you tired at bedtime   Get regular Exercise each day (6 hours before bedtime)   Take medications only as directed   Eat a light bedtime snack or warm drink   Warm milk   Warm herbal tea (non-caffeinated)   Special Therapy Measures   Sleep Restriction Therapy   Limit time in bed for 15 minutes more than sleep   Do not set limit under 4 hours   Increase time by 15 minutes per effective sleep trial   Effective sleep suggests >90% of bedtime asleep   Stimulus Control   Do not lie awake for more than 30 minutes   Get out of bed and perform a quiet activity  "  Return to bed when sleepy   Repeat as many times per night as needed   No Napping during day   Things to avoid   Do not Exercise just before bedtime   No overstimulating activities just before bed   No competitive games before bedtime   No exciting Television programs before bedtime   Avoid caffeine after lunchtime   Avoid chocolate   Avoid coffee, tea, or soda   Do not use alcohol to induce sleep (worsens Insomnia)   Do not take someone else's sleeping pills   Do not look at the clock when awakening   Do not turn on light when getting up to use bathroom   Read the book \"Say Good Night To Insomnia\"      "

## 2017-03-15 NOTE — LETTER
[address] [address]  [phone]      March 20, 2017      Mata Poe  [address] [place] [address]            Dear Mata Poe:    This is to remind you that your provider wanted you to return to the clinic for lab test LIPIDS AND A HEPATITIS C BLOOD TEST.    If you are coming in for Lipids and/or Glucose testing please fast for 10-12 hours. Morning medications can be taken with water.    You may call our office at [phone] to schedule an appointment.    Please disregard this notice if you have already had your labs drawn or made an            appointment.    Sincerely,        Mariusz Ordonez MD

## 2017-03-16 ASSESSMENT — PATIENT HEALTH QUESTIONNAIRE - PHQ9: SUM OF ALL RESPONSES TO PHQ QUESTIONS 1-9: 7

## 2017-03-16 ASSESSMENT — ANXIETY QUESTIONNAIRES: GAD7 TOTAL SCORE: 17

## 2017-03-20 ENCOUNTER — OFFICE VISIT (OUTPATIENT)
Dept: PALLIATIVE MEDICINE | Facility: CLINIC | Age: 57
End: 2017-03-20
Payer: COMMERCIAL

## 2017-03-20 DIAGNOSIS — M79.18 MYOFASCIAL PAIN: ICD-10-CM

## 2017-03-20 DIAGNOSIS — M25.572 CHRONIC PAIN OF LEFT ANKLE: ICD-10-CM

## 2017-03-20 DIAGNOSIS — G89.29 CHRONIC PAIN OF LEFT ANKLE: ICD-10-CM

## 2017-03-20 DIAGNOSIS — M25.50 MULTIPLE JOINT PAIN: ICD-10-CM

## 2017-03-20 DIAGNOSIS — G90.522 COMPLEX REGIONAL PAIN SYNDROME TYPE 1 OF LEFT LOWER EXTREMITY: Primary | ICD-10-CM

## 2017-03-20 PROCEDURE — 97112 NEUROMUSCULAR REEDUCATION: CPT | Mod: GP | Performed by: PHYSICAL THERAPIST

## 2017-03-20 PROCEDURE — 97162 PT EVAL MOD COMPLEX 30 MIN: CPT | Mod: GP | Performed by: PHYSICAL THERAPIST

## 2017-03-20 NOTE — MR AVS SNAPSHOT
After Visit Summary   3/20/2017    Mata Poe    MRN: [id number]           Patient Information     Date Of Birth          1960        Visit Information        Provider Department      3/20/2017 3:00 PM Harper Carrillo, PT [facility]        Today's Diagnoses     Complex regional pain syndrome type 1 of left lower extremity    -  1    Myofascial pain        Chronic pain of left ankle        Multiple joint pain           Follow-ups after your visit        Your next 10 appointments already scheduled     Apr 11, 2017 10:15 AM CDT   Radiology Injections with Mariusz Dallas MD   [facility] ([facility])    [address] [address]   [phone]            May 16, 2017  1:30 PM CDT   Return Visit with DIMITRI Corona CNP   [facility] ([facility])    [address] [address] [phone]              Who to contact     If you have questions or need follow up information about today's clinic visit or your schedule please contact [facility] directly at [phone].  Normal or non-critical lab and imaging results will be communicated to you by MyChart, letter or phone within 4 business days after the clinic has received the results. If you do not hear from us within 7 days, please contact the clinic through MyChart or phone. If you have a critical or abnormal lab result, we will notify you by phone as soon as possible.  Submit refill requests through [employer] or call your pharmacy and they will forward the refill request to us. Please allow 3 business days for your refill to be completed.          Additional Information About Your Visit        MyChart Information     [employer] gives you secure access to your electronic health record. If you see a primary care provider, you can also send messages to your care team and make appointments. If  you have questions, please call your primary care clinic.  If you do not have a primary care provider, please call [phone] and they will assist you.        Care EveryWhere ID     This is your Care EveryWhere ID. This could be used by other organizations to access your [facility] medical records  [id number]         Blood Pressure from Last 3 Encounters:   03/15/17 114/78   03/07/17 122/77   02/10/17 130/80    Weight from Last 3 Encounters:   03/15/17 98.8 kg (217 lb 12.8 oz)   03/07/17 100.2 kg (221 lb)   02/10/17 96.6 kg (213 lb)              We Performed the Following     HC PT EVAL, MODERATE COMPLEXITY     NEUROMUSCULAR RE-EDUCATION        Primary Care Provider Office Phone # Fax #    Maribel [place] [phone]       [facility] [address]   [place] [address]        Thank you!     Thank you for choosing [facility]  for your care. Our goal is always to provide you with excellent care. Hearing back from our patients is one way we can continue to improve our services. Please take a few minutes to complete the written survey that you may receive in the mail after your visit with us. Thank you!             Your Updated Medication List - Protect others around you: Learn how to safely use, store and throw away your medicines at www.disposemymeds.org.          This list is accurate as of: 3/20/17 11:59 PM.  Always use your most recent med list.                   Brand Name Dispense Instructions for use    alfuzosin 10 MG 24 hr tablet    UROXATRAL     Take 10 mg by mouth       ASPIRIN EC PO      Take 81 mg by mouth daily       atorvastatin 40 MG tablet    LIPITOR         latanoprost 0.005 % ophthalmic solution    XALATAN         LORazepam 1 MG tablet    ATIVAN    30 tablet    Take 0.5-1 tablets (0.5-1 mg) by mouth every 8 hours as needed for anxiety       order for DME      Respironics REMSTAR 60 Series Auto CPAP 7cm H2O, Airfit P10 nasal pillow mask w/medium pillows        pregabalin 25 MG capsule    LYRICA    90 capsule    Take 25mg at bedtime for 7 days, then take 25mg twice daily for 7 days, then take 25mg in the morning and 50mg at bedtime. Stop gabapentin the day before you start Lyrica       traZODone 150 MG tablet    DESYREL    30 tablet    Take 1 tablet (150 mg) by mouth nightly as needed for sleep Needs appointment for further refills.
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
## 2017-03-23 ENCOUNTER — OFFICE VISIT (OUTPATIENT)
Dept: PALLIATIVE MEDICINE | Facility: CLINIC | Age: 57
End: 2017-03-23
Payer: COMMERCIAL

## 2017-03-23 DIAGNOSIS — M25.50 MULTIPLE JOINT PAIN: ICD-10-CM

## 2017-03-23 DIAGNOSIS — M79.18 MYOFASCIAL PAIN: ICD-10-CM

## 2017-03-23 DIAGNOSIS — G90.522 COMPLEX REGIONAL PAIN SYNDROME TYPE 1 OF LEFT LOWER EXTREMITY: Primary | ICD-10-CM

## 2017-03-23 DIAGNOSIS — M25.572 CHRONIC PAIN OF LEFT ANKLE: ICD-10-CM

## 2017-03-23 DIAGNOSIS — G89.29 CHRONIC PAIN OF LEFT ANKLE: ICD-10-CM

## 2017-03-23 PROCEDURE — 97112 NEUROMUSCULAR REEDUCATION: CPT | Mod: GP | Performed by: PHYSICAL THERAPIST

## 2017-03-23 NOTE — MR AVS SNAPSHOT
After Visit Summary   3/23/2017    Mata Poe    MRN: [id number]           Patient Information     Date Of Birth          1960        Visit Information        Provider Department      3/23/2017 8:45 AM Harper Carrillo, PT [facility]        Today's Diagnoses     Complex regional pain syndrome type 1 of left lower extremity    -  1    Myofascial pain        Chronic pain of left ankle        Multiple joint pain           Follow-ups after your visit        Your next 10 appointments already scheduled     Mar 24, 2017  9:15 AM CDT   LAB with  LAB   [facility]    [address] [address]   [phone]           Patient must bring picture ID.  Patient should be prepared to give a urine specimen  Please do not eat 10-12 hours before your appointment if you are coming in fasting for labs on lipids, cholesterol, or glucose (sugar).  Pregnant women should follow their Care Team instructions. Water with medications is okay. Do not drink coffee or other fluids.   If you have concerns about taking  your medications, please ask at office or if scheduling via [employer], send a message by clicking on Secure Messaging, Message Your Care Team.            Apr 06, 2017  2:30 PM CDT   Return Visit with Harper Carrillo, PT   [facility] ([facility])    [address] [id number] [phone]            Apr 11, 2017 10:15 AM CDT   Radiology Injections with Mariusz Dallas MD   [facility] ([facility])    [address] [id number] [phone]            May 16, 2017  1:30 PM CDT   Return Visit with DIMITRI Corona [facility] ([facility])    [address] [address]   [phone]              Who to contact     If you have questions or  need follow up information about today's clinic visit or your schedule please contact [facility] directly at [phone].  Normal or non-critical lab and imaging results will be communicated to you by MyChart, letter or phone within 4 business days after the clinic has received the results. If you do not hear from us within 7 days, please contact the clinic through [employer] or phone. If you have a critical or abnormal lab result, we will notify you by phone as soon as possible.  Submit refill requests through [employer] or call your pharmacy and they will forward the refill request to us. Please allow 3 business days for your refill to be completed.          Additional Information About Your Visit        App[employer] Information     [employer] gives you secure access to your electronic health record. If you see a primary care provider, you can also send messages to your care team and make appointments. If you have questions, please call your primary care clinic.  If you do not have a primary care provider, please call [phone] and they will assist you.        Care EveryWhere ID     This is your Care EveryWhere ID. This could be used by other organizations to access your [place] medical records  [id number]         Blood Pressure from Last 3 Encounters:   03/15/17 114/78   03/07/17 122/77   02/10/17 130/80    Weight from Last 3 Encounters:   03/15/17 98.8 kg (217 lb 12.8 oz)   03/07/17 100.2 kg (221 lb)   02/10/17 96.6 kg (213 lb)              We Performed the Following     NEUROMUSCULAR RE-EDUCATION        Primary Care Provider Office Phone # Fax #    Maribel Lancaster [phone]       [place] [address] [place] [address]        Thank you!     Thank you for choosing [facility]  for your care. Our goal is always to provide you with excellent care. Hearing back from our patients is one way we can continue to improve our services. Please take a few minutes  to complete the written survey that you may receive in the mail after your visit with us. Thank you!             Your Updated Medication List - Protect others around you: Learn how to safely use, store and throw away your medicines at www.disposemymeds.org.          This list is accurate as of: 3/23/17 11:30 AM.  Always use your most recent med list.                   Brand Name Dispense Instructions for use    alfuzosin 10 MG 24 hr tablet    UROXATRAL     Take 10 mg by mouth       ASPIRIN EC PO      Take 81 mg by mouth daily       atorvastatin 40 MG tablet    LIPITOR         latanoprost 0.005 % ophthalmic solution    XALATAN         LORazepam 1 MG tablet    ATIVAN    30 tablet    Take 0.5-1 tablets (0.5-1 mg) by mouth every 8 hours as needed for anxiety       order for [facility]      Respironics REMSTAR 60 Series Auto CPAP 7cm H2O, Airfit P10 nasal pillow mask w/medium pillows       pregabalin 25 MG capsule    LYRICA    90 capsule    Take 25mg at bedtime for 7 days, then take 25mg twice daily for 7 days, then take 25mg in the morning and 50mg at bedtime. Stop gabapentin the day before you start Lyrica       traZODone 150 MG tablet    DESYREL    30 tablet    Take 1 tablet (150 mg) by mouth nightly as needed for sleep Needs appointment for further refills.

## 2017-03-23 NOTE — PROGRESS NOTES
Pain Physical Therapy Progress Note    Patient Name: Mata Poe     YOB: 1960     Medical Record Number: [id number]    Visits: 2/12    Diagnosis:    Complex regional pain syndrome type 1 of left lower extremity  Myofascial pain  Chronic pain of left ankle  Multiple joint pain    Subjective: Patient reports that having issues with back pain; R shoulder and R knee--along with L ankle/lower leg pain.    Patient worried that there is more going on with his L LE.    Self Care  HEP: NA  Walking/Aerobic Activity: NA  Heat/Ice: NA  Posture: NA  Mini Breaks: NA  Breathing/Relaxation: NA  Pacing: NA    Objective Findings:  OBSERVATION: Appears anxious,  Guarded posture; forward head; rounded shoulders; clenching jaw and fists, poor core engagement with seated posture; hyperactive UT; Respirations rapid and uneven at times; upper chest breathing noted  GAIT & LOCOMOTION: Antalgic: moving slowly, guarded, minimal arm movement, decreased heel to toe gait and knee flexion on L; leaning to the R    Treatment Interventions:  Neuromuscular Reeducation:   Therapeutic Neuroscience Education: Brain Body Map: Homunculus: Patient educated regarding the role of the primary somatosensory cortex in pain and body maps (which depends on movement and use of body parts) and that for people with persistent pain, decreased use of the body and other biological processes change the body maps.  Laterality proficiency was discussed.  Performed R/L foot flash cards; discussed using magazines to help work on R/L laterality at home.  Instructed in visualization exercises for L foot; discussed working on brain exercise for foot to start with, working on decreasing sensitivity to nervous system, help with decreasing pain; discussed that it will take time; Discussed how stress, anxiety, and pain are intertwined with each other.  Diaphragmatic breathing exercise: Patient instructed in diaphragmatic breathing in order to help calm  the nervous system and reduce stress.  This will also help to decrease use of accessory muscles, promote control of the diaphragm muscle, increase trunk mobility, and to increase oxygen/gas intake. Gave Handout.  Gave patient stress management CD.  __________________________________________________________________    Instruction on home program: breathing, visualization    Assessment:  Ongoing Functional Limitations Include:  Patient tolerated treatment  Impression: needs a lot of reinforcement and cues for relaxation    Recommendations: continue, may benefit from health psychology; patient does have own therapist that he sees regularly    Intensity Level: 1 (1=low intensity; 5=high intensity)    Demonstrates/Verbalizes Technique: 2, 3 (1= poor technique-difficulty performing exercises,significant cues required; 5= good technique-performs exercises without cues)    Body Awareness: 1 (1=low awareness; 5=high awareness)    Posture/Stability: 2 (1= poor posture, stability; 5= good posture, stability)    Motivational Level: Distracted, anxious    Response to Teaching: needs reinforcement    Factors that affect learning: None    _______________________________________________________________________  Plan of Care   Continue PT to support reactivation and integration of self regulation pain management skills;  Focus of next session will be on: TNE, breathing, GMI, HEP     Present:  AYO     Total Visit Time:  45 minutes   Neuro-Re Ed: 45 mins      Therapist: Harper Carrillo PT             Date: 3/23/2017

## 2017-03-24 DIAGNOSIS — Z11.59 NEED FOR HEPATITIS C SCREENING TEST: ICD-10-CM

## 2017-03-24 DIAGNOSIS — Z13.220 LIPID SCREENING: ICD-10-CM

## 2017-03-24 DIAGNOSIS — Z12.5 SCREENING FOR PROSTATE CANCER: ICD-10-CM

## 2017-03-24 LAB
CHOLEST SERPL-MCNC: 167 MG/DL
HCV AB SERPL QL IA: NORMAL
HDLC SERPL-MCNC: 41 MG/DL
LDLC SERPL CALC-MCNC: ABNORMAL MG/DL
LDLC SERPL DIRECT ASSAY-MCNC: 73 MG/DL
NONHDLC SERPL-MCNC: 126 MG/DL
PSA SERPL-ACNC: 2.54 UG/L (ref 0–4)
TRIGL SERPL-MCNC: 407 MG/DL

## 2017-03-24 PROCEDURE — G0103 PSA SCREENING: HCPCS | Performed by: FAMILY MEDICINE

## 2017-03-24 PROCEDURE — 83721 ASSAY OF BLOOD LIPOPROTEIN: CPT | Mod: 59 | Performed by: FAMILY MEDICINE

## 2017-03-24 PROCEDURE — 80061 LIPID PANEL: CPT | Performed by: FAMILY MEDICINE

## 2017-03-24 PROCEDURE — 86803 HEPATITIS C AB TEST: CPT | Performed by: FAMILY MEDICINE

## 2017-03-24 PROCEDURE — 36415 COLL VENOUS BLD VENIPUNCTURE: CPT | Performed by: FAMILY MEDICINE

## 2017-04-05 ENCOUNTER — TELEPHONE (OUTPATIENT)
Dept: PALLIATIVE MEDICINE | Facility: CLINIC | Age: 57
End: 2017-04-05

## 2017-04-07 ENCOUNTER — MYC MEDICAL ADVICE (OUTPATIENT)
Dept: PALLIATIVE MEDICINE | Facility: CLINIC | Age: 57
End: 2017-04-07

## 2017-04-07 DIAGNOSIS — M25.572 CHRONIC PAIN OF LEFT ANKLE: ICD-10-CM

## 2017-04-07 DIAGNOSIS — G89.29 CHRONIC BILATERAL LOW BACK PAIN WITHOUT SCIATICA: ICD-10-CM

## 2017-04-07 DIAGNOSIS — G90.522 COMPLEX REGIONAL PAIN SYNDROME TYPE 1 OF LEFT LOWER EXTREMITY: ICD-10-CM

## 2017-04-07 DIAGNOSIS — M54.50 CHRONIC BILATERAL LOW BACK PAIN WITHOUT SCIATICA: ICD-10-CM

## 2017-04-07 DIAGNOSIS — G89.29 CHRONIC PAIN OF LEFT ANKLE: ICD-10-CM

## 2017-04-07 DIAGNOSIS — M25.50 MULTIPLE JOINT PAIN: ICD-10-CM

## 2017-04-07 DIAGNOSIS — M79.18 MYOFASCIAL PAIN: ICD-10-CM

## 2017-04-10 NOTE — TELEPHONE ENCOUNTER
Vani Ellis,  Since taking Lyrica I have not felt a decrease in the foot and ankle pain, however My moods have gotten worse, I feel more tired, anxious and depressed than before I started. I would like to try a different approach if possible. Thank You Zac

## 2017-04-11 ENCOUNTER — TELEPHONE (OUTPATIENT)
Dept: PALLIATIVE MEDICINE | Facility: CLINIC | Age: 57
End: 2017-04-11

## 2017-04-11 NOTE — TELEPHONE ENCOUNTER
Recommend calling [facility] in [place] to see if Dr. Db Lafleur can do Lumbar sympathetic block and when that may be scheduled. If sooner than us, I can re-write order. How long is patient on antibiotics for the infection??    Thanks.  Jenna MOSLEY, RN CNP, FNP  [facility] [place]

## 2017-04-11 NOTE — TELEPHONE ENCOUNTER
Appt is on 5/1/2017- Will send to Jenna to determine if [facility] can get pt in sooner.     Juliane Hicks RN, [facility]  Pain Clinic Care Coordinator

## 2017-04-11 NOTE — TELEPHONE ENCOUNTER
Pt has an active infection needs to cx the lumbar sympathetic block that is scheduled today.  He is really disappointed about this stating that he has been waiting since December to get this done.    Informed him to call the appointment line and reschedule to the next available appointment which will be a ways out.    Will review this and see check with Jenna Parrish NP if pt can be referred to [facility] if the next appointment with us is far out.    Frida Finney RN-BSN  [facility]

## 2017-04-11 NOTE — TELEPHONE ENCOUNTER
odilon sent to pt:  Marco A Frank.  What dose are you on of the lyrica?  Are you still taking it?    Frida Finney, RN-BSN  [facility]

## 2017-04-12 ENCOUNTER — OFFICE VISIT (OUTPATIENT)
Dept: FAMILY MEDICINE | Facility: CLINIC | Age: 57
End: 2017-04-12
Payer: COMMERCIAL

## 2017-04-12 ENCOUNTER — MYC REFILL (OUTPATIENT)
Dept: FAMILY MEDICINE | Facility: CLINIC | Age: 57
End: 2017-04-12

## 2017-04-12 DIAGNOSIS — G57.72 CAUSALGIA OF LOWER LIMB, LEFT: ICD-10-CM

## 2017-04-12 DIAGNOSIS — R05.9 COUGH: Primary | ICD-10-CM

## 2017-04-12 DIAGNOSIS — F41.8 MIXED ANXIETY DEPRESSIVE DISORDER: ICD-10-CM

## 2017-04-12 LAB
FLUAV+FLUBV AG SPEC QL: ABNORMAL
FLUAV+FLUBV AG SPEC QL: POSITIVE
SPECIMEN SOURCE: ABNORMAL

## 2017-04-12 PROCEDURE — 99207 ZZC NO CHARGE NURSE ONLY: CPT

## 2017-04-12 PROCEDURE — 87804 INFLUENZA ASSAY W/OPTIC: CPT | Performed by: FAMILY MEDICINE

## 2017-04-12 NOTE — TELEPHONE ENCOUNTER
My chart message from pt:    Hi, I'am back to 1 at bedtime every other day since last week,but I have no change in the my leg pain when I was at 3. Also I caught the flu and missed some PT appointments, so should I continue those or wait until after the lumbar appointment? I do not know when the flu runs its course,but I should be good by May 1 ?  Thank You Zac.

## 2017-04-12 NOTE — TELEPHONE ENCOUNTER
Patient is wanting a call from a nurse or Jenna Parrish.     Mana Durand    [facility] Management Clinic

## 2017-04-12 NOTE — MR AVS SNAPSHOT
After Visit Summary   4/12/2017    Mata Poe    MRN: [id number]           Patient Information     Date Of Birth          1960        Visit Information        Provider Department      4/12/2017 1:30 PM Sisi/Lpn, Fl [facility]        Today's Diagnoses     Cough    -  1       Follow-ups after your visit        Your next 10 appointments already scheduled     May 01, 2017 10:45 AM CDT   Radiology Injections with Mariusz Dallas MD   [facility] ([facility])    [address] [address]   [phone]            May 16, 2017  1:30 PM CDT   Return Visit with DIMITRI Corona CNP   [facility] ([facility])    [address] [address]   [phone]            May 24, 2017 11:30 AM CDT   MyChart Injury Follow Up with Mariusz Ordonez MD   [facility] ([facility])    [address] [place] [id number] [phone]              Who to contact     Normal or non-critical lab and imaging results will be communicated to you by [employer], letter or phone within 4 business days after the clinic has received the results. If you do not hear from us within 7 days, please contact the clinic through MyChart or phone. If you have a critical or abnormal lab result, we will notify you by phone as soon as possible.  Submit refill requests through fishfishme or call your pharmacy and they will forward the refill request to us. Please allow 3 business days for your refill to be completed.          If you need to speak with a  for additional information , please call: [phone]           Additional Information About Your Visit        [employer]Play Information     fishfishme gives you secure access to your electronic health record. If you see a primary care provider, you can also send messages to your care team and make  appointments. If you have questions, please call your primary care clinic.  If you do not have a primary care provider, please call [phone] and they will assist you.        Care EveryWhere ID     This is your Care EveryWhere ID. This could be used by other organizations to access your [place] medical records  [id number]         Blood Pressure from Last 3 Encounters:   03/15/17 114/78   03/07/17 122/77   02/10/17 130/80    Weight from Last 3 Encounters:   03/15/17 217 lb 12.8 oz (98.8 kg)   03/07/17 221 lb (100.2 kg)   02/10/17 213 lb (96.6 kg)              We Performed the Following     Influenza A/B antigen        Primary Care Provider Office Phone # Fax #    Maribel Lancaster [phone]       [facility] [address]   [place] [facility] [place] [address]        Thank you!     Thank you for choosing [facility]  for your care. Our goal is always to provide you with excellent care. Hearing back from our patients is one way we can continue to improve our services. Please take a few minutes to complete the written survey that you may receive in the mail after your visit with us. Thank you!             Your Updated Medication List - Protect others around you: Learn how to safely use, store and throw away your medicines at www.disposemymeds.org.          This list is accurate as of: 4/12/17  2:31 PM.  Always use your most recent med list.                   Brand Name Dispense Instructions for use    alfuzosin 10 MG 24 hr tablet    UROXATRAL     Take 10 mg by mouth       ASPIRIN EC PO      Take 81 mg by mouth daily       atorvastatin 40 MG tablet    LIPITOR         latanoprost 0.005 % ophthalmic solution    XALATAN         LORazepam 1 MG tablet    ATIVAN    30 tablet    Take 0.5-1 tablets (0.5-1 mg) by mouth every 8 hours as needed for anxiety       order for DME      Respironics REMSTAR 60 Series Auto CPAP 7cm H2O, Airfit P10 nasal pillow mask w/medium pillows       pregabalin 25  MG capsule    LYRICA    90 capsule    Take 25mg at bedtime for 7 days, then take 25mg twice daily for 7 days, then take 25mg in the morning and 50mg at bedtime. Stop gabapentin the day before you start Lyrica       traZODone 150 MG tablet    DESYREL    30 tablet    Take 1 tablet (150 mg) by mouth nightly as needed for sleep Needs appointment for further refills.

## 2017-04-12 NOTE — TELEPHONE ENCOUNTER
Called and spoke to pt at length. Pt stated that he does not have an active infection but he does have flu like symptoms that have occurred for multiple weeks. At first he was stating yes to going to [facility] and then he later stated no it was only 2 1/2 weeks away and so he would wait. Did advise him to make an appt with his primary care Dr and get tested for the flu so that he could determine if it was a cold or the flu. Stated that this way when 5/1 arrived we could proceed knowing for sure that it would be OK. Pt was in agreement. Will plan for the 5/1 injection.     Juliane Hicks RN, [facility]  Pain Clinic Care Coordinator

## 2017-04-12 NOTE — TELEPHONE ENCOUNTER
Message from Simmrhart:  Original authorizing provider: MD Zac Jefferson would like a refill of the following medications:  LORazepam (ATIVAN) 1 MG tablet [Mariusz Ordonez MD]    Preferred pharmacy: [facility] [address]    Comment:

## 2017-04-13 NOTE — TELEPHONE ENCOUNTER
Essence to patient Created: 4/13/2017 11:02 AM    Nicolasgordo Zac,    So sorry to hear you have the flu.  I hope things are improving for you. You can and should return to PT as soon as you are recovered regardless of your upcoming injection. PT will be an important part of your care with us at this point. I understand that you're not currently feeling well but you do have at least 3 cancelled and 1 no show for PT since 3/13 so I really want to impress upon you that this is an important  part of your plan of care with us that we would like to be fully invested in. I'm so sorry you haven't had any benefit with the Lyrica. I will pass that information on to your provider Jenna Parrish. I see you have rescheduled your injection with Dr. Cynthia Dallas for 5/1/17 at 10:45 am (please arrive by 10:15 for your IV placement). I'm hoping you get some relief with your injection. You also have a follow up with Jenna scheduled on 5/16/17 at 1:30 pm to evaluate the effectiveness of the Lyrica, the injection and PT.     Thank You,  Harper Wilkinson, SUSYN, RN  Care Coordinator  [facility] [place]

## 2017-04-14 ENCOUNTER — MYC MEDICAL ADVICE (OUTPATIENT)
Dept: PALLIATIVE MEDICINE | Facility: CLINIC | Age: 57
End: 2017-04-14

## 2017-04-14 DIAGNOSIS — M79.2 NEUROPATHIC PAIN: ICD-10-CM

## 2017-04-14 DIAGNOSIS — G90.522 COMPLEX REGIONAL PAIN SYNDROME TYPE 1 OF LEFT LOWER EXTREMITY: ICD-10-CM

## 2017-04-14 DIAGNOSIS — G89.29 CHRONIC PAIN OF LEFT ANKLE: Primary | ICD-10-CM

## 2017-04-14 DIAGNOSIS — M25.572 CHRONIC PAIN OF LEFT ANKLE: Primary | ICD-10-CM

## 2017-04-14 RX ORDER — PREGABALIN 25 MG/1
CAPSULE ORAL
Qty: 90 CAPSULE | Refills: 0 | Status: SHIPPED | OUTPATIENT
Start: 2017-04-14 | End: 2017-04-18

## 2017-04-14 NOTE — TELEPHONE ENCOUNTER
Zac-  I have given you a refill of the Lyrica. I cannot increase the dose until you slowly increase as previously and you are taking 25mg in the morning and 50mg at bedtime. Send me a Stell[employer] message when you are up to that dose and if you are tolerating it without relief, we can go up on the dose.   Please reschedule PHYSICAL THERAPY.  Thanks.  Jenna MOSLEY, RN CNP, FNP  [facility]    I sent the above Stellarray message to the patient.     Signed Prescriptions:                        Disp   Refills    pregabalin (LYRICA) 25 MG capsule          90 cap*0        Sig: Take 25mg at bedtime for 7 days, then take 25mg twice           daily for 7 days, then take 25mg in the morning           and 50mg at bedtime. Stop gabapentin the day           before you start Lyrica  Authorizing Provider: JENNA MCDERMOTT    Signed script placed in touchdown bin at [facility].    Jenna MOSLEY CNP FNP RN  [facility]

## 2017-04-17 RX ORDER — LORAZEPAM 1 MG/1
.5-1 TABLET ORAL EVERY 8 HOURS PRN
Qty: 30 TABLET | Refills: 0 | Status: SHIPPED | OUTPATIENT
Start: 2017-04-17 | End: 2017-05-30

## 2017-04-17 NOTE — TELEPHONE ENCOUNTER
Hi, When I am over the flu I will schedule PT , I can schedule those at the [place] location would that work for You ? Thanks Azc                    Will route to Jenna ,but also the  to get him scheduled.  Saranya thibodeaux rn

## 2017-04-17 NOTE — TELEPHONE ENCOUNTER
I cannot tolerate the lyrica it did not reduce the pain and only made me more depressed I do not want more I was hoping for another alternative? I have tapered off an am feeling a little better. Thanks Zac

## 2017-04-18 ENCOUNTER — TELEPHONE (OUTPATIENT)
Dept: PALLIATIVE MEDICINE | Facility: CLINIC | Age: 57
End: 2017-04-18

## 2017-04-18 ENCOUNTER — OFFICE VISIT (OUTPATIENT)
Dept: PALLIATIVE MEDICINE | Facility: CLINIC | Age: 57
End: 2017-04-18
Payer: COMMERCIAL

## 2017-04-18 DIAGNOSIS — M25.50 MULTIPLE JOINT PAIN: ICD-10-CM

## 2017-04-18 DIAGNOSIS — G90.522 COMPLEX REGIONAL PAIN SYNDROME TYPE 1 OF LEFT LOWER EXTREMITY: Primary | ICD-10-CM

## 2017-04-18 DIAGNOSIS — M25.572 CHRONIC PAIN OF LEFT ANKLE: ICD-10-CM

## 2017-04-18 DIAGNOSIS — G89.29 CHRONIC PAIN OF LEFT ANKLE: ICD-10-CM

## 2017-04-18 DIAGNOSIS — M79.18 MYOFASCIAL PAIN: ICD-10-CM

## 2017-04-18 PROCEDURE — 97110 THERAPEUTIC EXERCISES: CPT | Mod: GP | Performed by: PHYSICAL THERAPIST

## 2017-04-18 PROCEDURE — 97112 NEUROMUSCULAR REEDUCATION: CPT | Mod: GP | Performed by: PHYSICAL THERAPIST

## 2017-04-18 NOTE — MR AVS SNAPSHOT
After Visit Summary   4/18/2017    Mata Poe    MRN: [id number]           Patient Information     Date Of Birth          1960        Visit Information        Provider Department      4/18/2017 1:00 PM Harper Carrillo, PT [facility] [place]        Today's Diagnoses     Complex regional pain syndrome type 1 of left lower extremity    -  1    Myofascial pain        Chronic pain of left ankle        Multiple joint pain           Follow-ups after your visit        Your next 10 appointments already scheduled     Apr 19, 2017 10:40 AM CDT   Return Sleep Patient with Manny Yancey MD   [facility] ([facility])    [address] [place] [address]   [phone]            Apr 25, 2017  9:30 AM CDT   Return Visit with Harper Carrillo, PT   [facility] [place] ([facility] [place])    [address]  [place] [address]   [phone]            May 01, 2017 10:45 AM CDT   Radiology Injections with Mariusz Dallas MD   [facility] [place] ([facility] [place])    [address]  [place] [address]   [phone]            May 02, 2017  9:30 AM CDT   Return Visit with Harper Carrillo PT   [facility] [place] ([facility] [place])    [address]  [place] [address]   [phone]            May 16, 2017  1:30 PM CDT   Return Visit with DIMITRI Corona [facility] [place] ([facility] [place])    [address]  [place] [address]   [phone]            May 24, 2017 11:30 AM CDT   MyChart Injury Follow Up with Mariusz Ordonez MD   [facility] ([facility])    [address] [id number] [phone]              Who to contact     If you have questions or need follow up information about today's clinic visit or your  schedule please contact [facility] directly at [phone].  Normal or non-critical lab and imaging results will be communicated to you by MyChart, letter or phone within 4 business days after the clinic has received the results. If you do not hear from us within 7 days, please contact the clinic through MyChart or phone. If you have a critical or abnormal lab result, we will notify you by phone as soon as possible.  Submit refill requests through [employer] or call your pharmacy and they will forward the refill request to us. Please allow 3 business days for your refill to be completed.          Additional Information About Your Visit        About[employer] Information     [employer] gives you secure access to your electronic health record. If you see a primary care provider, you can also send messages to your care team and make appointments. If you have questions, please call your primary care clinic.  If you do not have a primary care provider, please call [phone] and they will assist you.        Care EveryWhere ID     This is your Care EveryWhere ID. This could be used by other organizations to access your [place] medical records  [id number]         Blood Pressure from Last 3 Encounters:   03/15/17 114/78   03/07/17 122/77   02/10/17 130/80    Weight from Last 3 Encounters:   03/15/17 98.8 kg (217 lb 12.8 oz)   03/07/17 100.2 kg (221 lb)   02/10/17 96.6 kg (213 lb)              We Performed the Following     NEUROMUSCULAR RE-EDUCATION     THERAPEUTIC EXERCISES        Primary Care Provider Office Phone # Fax #    Maribel Lancaster [phone]       [facility] [address] [place] [address]        Thank you!     Thank you for choosing [facility]  for your care. Our goal is always to provide you with excellent care. Hearing back from our patients is one way we can continue to improve our services. Please take a few minutes to complete the written survey that  you may receive in the mail after your visit with us. Thank you!             Your Updated Medication List - Protect others around you: Learn how to safely use, store and throw away your medicines at www.disposemymeds.org.          This list is accurate as of: 4/18/17  2:46 PM.  Always use your most recent med list.                   Brand Name Dispense Instructions for use    alfuzosin 10 MG 24 hr tablet    UROXATRAL     Take 10 mg by mouth       ASPIRIN EC PO      Take 81 mg by mouth daily       atorvastatin 40 MG tablet    LIPITOR         latanoprost 0.005 % ophthalmic solution    XALATAN         LORazepam 1 MG tablet    ATIVAN    30 tablet    Take 0.5-1 tablets (0.5-1 mg) by mouth every 8 hours as needed for anxiety       order for [facility]      Respironics REMSTAR 60 Series Auto CPAP 7cm H2O, Airfit P10 nasal pillow mask w/medium pillows       pregabalin 25 MG capsule    LYRICA    90 capsule    Take 25mg at bedtime for 7 days, then take 25mg twice daily for 7 days, then take 25mg in the morning and 50mg at bedtime. Stop gabapentin the day before you start Lyrica       traZODone 150 MG tablet    DESYREL    30 tablet    Take 1 tablet (150 mg) by mouth nightly as needed for sleep Needs appointment for further refills.

## 2017-04-18 NOTE — TELEPHONE ENCOUNTER
Sending to provider for FYI.     Juliane Hicks RN, [facility]  Pain Clinic Care Coordinator

## 2017-04-18 NOTE — TELEPHONE ENCOUNTER
Placed order for HEALTH PSYCHOLOGY at the suggestion of his physical therapist, Bridget Carrillo in our pain management department, I wholeheartedly agree.     Jenna MOSLEY RN CNP, FNP  [facility] [place]

## 2017-04-18 NOTE — TELEPHONE ENCOUNTER
No, he needs to schedule PHYSICAL THERAPY at [place] with pain management based PHYSICAL THERAPY.   Jenna MOSLEY RN CNP, FNP  [facility]

## 2017-04-18 NOTE — TELEPHONE ENCOUNTER
Bridget- I think you spoke to the patient re: seeing Kemal Kenney in HEALTH PSYCHOLOGY today at your appointment, correct? Was he receptive to this or not?    Thanks.  Jenna MOSLEY, RN CNP, FNP  [facility] [place]

## 2017-04-18 NOTE — TELEPHONE ENCOUNTER
"Hi Zac-    Well, I am glad that you are starting to feel better once tapered off of Lyrica.   We could trial Cymbalta (it is an anti-depressant, anti-anxiety medication that is also FDA approved to help treat chronic joint pain). Let me know if you would like to try this medication. If so, I would begin it at low doses and slowly increase. When you begin it, it is common to have a bit of a headache, your stomach may feel a little bit \"off\" and your bowel movements may be a little bit looser than usual for a few days.   Let me know your thoughts on this option.    Thanks.  Jenna MOSLEY RN CNP, ERIN  [facility]    I sent the patient the above Posse message.   Jenna MOSLEY RN CNP, LOLISP  [facility]    "

## 2017-04-18 NOTE — TELEPHONE ENCOUNTER
Pt already has appts with Pain PT closing this encounter.     Juliane Hicks RN, [facility]  Pain Clinic Care Coordinator

## 2017-04-18 NOTE — PROGRESS NOTES
Pain Physical Therapy Progress Note    Patient Name: Mata Poe     YOB: 1960     Medical Record Number: [id number]    Visits: 3/12    Diagnosis:    Complex regional pain syndrome type 1 of left lower extremity  Myofascial pain  Chronic pain of left ankle  Multiple joint pain    Subjective: Patient reports that he is having issues with his R knee; feels like it is clicking and unable to kneel on it; continues to have L ankle/foot pain.    Patient recently had influenza and now getting over it; feels like he didn't do anything for a few weeks; and now needs to make up for it.    Is concerned that there is more going on with his L ankle; has screws in from the past; wondering if that is the problem; unable to do sympathetic block last week due to illness, but looking forward to getting done soon.    Self Care  HEP: NA  Walking/Aerobic Activity: NA  Heat/Ice: has iced R knee a couple times; encouraged to do more often if helpful; doesn't like cold on L LE; does feel better when using hot tub, warm baths  Posture: NA  Mini Breaks: NA  Breathing/Relaxation: hasn't worked on recently  Pacing: NA    Objective Findings:    OBSERVATION: Appears anxious, but less than last visit, Guarded posture; forward head; rounded shoulders; clenching jaw and fists, poor core engagement with seated posture; upper chest breathing noted  GAIT & LOCOMOTION: Antalgic: moving slowly, guarded, minimal arm movement, decreased heel to toe gait and knee flexion on L; leaning to the R  MUSCLE PERFORMANCE: demo slight lateral patellar tracking on R  PALPATION: tenderness along lateral R knee; continues to have increased sensitivity to touch along L LE    Treatment Interventions:  Therapeutic Procedures/Exercises: instructed in ball/pillow squeezes; Visualization exercises for GMI; gave instructions for magazines with L/R laterality     Neuromuscular Reeducation:   Therapeutic Neuroscience Education: discussed GMI; R/L  laterality, body maps; working on how to explain pain; gave handouts/laterality homework; performed foot laterality flash cards; discussed how GMI is helping to work on decreasing sensitivity of L LE; working on brain exercises to start with before doing actual movement exercises with L LE/foot;' Discussed how stress, anxiety, and pain are intertwined with each other.  Recommend patient follow up with health psychology, got order from provider.  Discussed how knowledge/education helps to decrease pain; discussed having second opinion--as patient wondering about screws; provider had mentioned follow up if needed--will ask for referral if needed.  __________________________________________________________________    Instruction on home program: visualization exercises    Assessment:  Ongoing Functional Limitations Include:  Patient tolerated/responded well to treatment  Impression: needs a lot of reassurance with treatment plan and encouragement    Recommendations: TEAM, recommend follow up with health psychology    Intensity Level: 1 (1=low intensity; 5=high intensity)    Demonstrates/Verbalizes Technique: 2 (1= poor technique-difficulty performing exercises,significant cues required; 5= good technique-performs exercises without cues)    Body Awareness: 2 (1=low awareness; 5=high awareness)    Posture/Stability: 2 (1= poor posture, stability; 5= good posture, stability)    Motivational Level: Cooperative and Distracted, anxious    Response to Teaching: needs reinforcement    Factors that affect learning: None    _______________________________________________________________________  Plan of Care   Continue PT to support reactivation and integration of self regulation pain management skills;  Focus of next session will be on: KOFI DOVE     Present:  AYO     Total Visit Time:  45 minutes   Neuro-Re Ed: 35 mins; TE: 10 mins      Therapist: Harper Carrillo PT             Date: 4/18/2017

## 2017-04-19 ENCOUNTER — OFFICE VISIT (OUTPATIENT)
Dept: SLEEP MEDICINE | Facility: CLINIC | Age: 57
End: 2017-04-19
Payer: COMMERCIAL

## 2017-04-19 VITALS
SYSTOLIC BLOOD PRESSURE: 120 MMHG | HEIGHT: 71 IN | WEIGHT: 217 LBS | HEART RATE: 68 BPM | DIASTOLIC BLOOD PRESSURE: 75 MMHG | BODY MASS INDEX: 30.38 KG/M2 | OXYGEN SATURATION: 98 %

## 2017-04-19 DIAGNOSIS — E66.09 NON MORBID OBESITY DUE TO EXCESS CALORIES: ICD-10-CM

## 2017-04-19 DIAGNOSIS — G47.01 INSOMNIA DUE TO MEDICAL CONDITION: ICD-10-CM

## 2017-04-19 DIAGNOSIS — G47.33 OSA (OBSTRUCTIVE SLEEP APNEA): Primary | Chronic | ICD-10-CM

## 2017-04-19 PROBLEM — F41.8 MIXED ANXIETY DEPRESSIVE DISORDER: Chronic | Status: ACTIVE | Noted: 2017-02-17

## 2017-04-19 PROBLEM — N13.8 BENIGN PROSTATIC HYPERPLASIA WITH URINARY OBSTRUCTION: Chronic | Status: ACTIVE | Noted: 2017-01-31

## 2017-04-19 PROBLEM — N40.1 BENIGN PROSTATIC HYPERPLASIA WITH URINARY OBSTRUCTION: Status: ACTIVE | Noted: 2017-01-31

## 2017-04-19 PROBLEM — N40.1 BENIGN PROSTATIC HYPERPLASIA WITH URINARY OBSTRUCTION: Chronic | Status: ACTIVE | Noted: 2017-01-31

## 2017-04-19 PROBLEM — N13.8 BENIGN PROSTATIC HYPERPLASIA WITH URINARY OBSTRUCTION: Status: ACTIVE | Noted: 2017-01-31

## 2017-04-19 PROCEDURE — 99214 OFFICE O/P EST MOD 30 MIN: CPT | Performed by: INTERNAL MEDICINE
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
## 2017-04-19 NOTE — PATIENT INSTRUCTIONS

## 2017-04-19 NOTE — TELEPHONE ENCOUNTER
I did talk with him about Health psychology and how it would differ from the therapist that he regularly sees.  He seemed willing to try and planned to schedule when he left.    Harper Carrillo, PT  [facility] [place]

## 2017-04-19 NOTE — MR AVS SNAPSHOT
After Visit Summary   4/19/2017    Mata Poe    MRN: [id number]           Patient Information     Date Of Birth          1960        Visit Information        Provider Department      4/19/2017 10:40 AM Manny Yancey MD [facility]        Today's Diagnoses     Obstructive Sleep Apnea (severe AHI=70)    -  1    Non morbid obesity due to excess calories        Insomnia due to medical condition          Care Instructions      Your BMI is Body mass index is 30.27 kg/(m^2).  Weight management is a personal decision.  If you are interested in exploring weight loss strategies, the following discussion covers the approaches that may be successful. Body mass index (BMI) is one way to tell whether you are at a healthy weight, overweight, or obese. It measures your weight in relation to your height.  A BMI of 18.5 to 24.9 is in the healthy range. A person with a BMI of 25 to 29.9 is considered overweight, and someone with a BMI of 30 or greater is considered obese. More than two-thirds of American adults are considered overweight or obese.  Being overweight or obese increases the risk for further weight gain. Excess weight may lead to heart disease and diabetes.  Creating and following plans for healthy eating and physical activity may help you improve your health.  Weight control is part of healthy lifestyle and includes exercise, emotional health, and healthy eating habits. Careful eating habits lifelong are the mainstay of weight control. Though there are significant health benefits from weight loss, long-term weight loss with diet alone may be very difficult to achieve- studies show long-term success with dietary management in less than 10% of people. Attaining a healthy weight may be especially difficult to achieve in those with severe obesity. In some cases, medications, devices and surgical management might be considered.  What can you do?  If you are overweight or obese and  are interested in methods for weight loss, you should discuss this with your provider.     Consider reducing daily calorie intake by 500 calories.     Keep a food journal.     Avoiding skipping meals, consider cutting portions instead.    Diet combined with exercise helps maintain muscle while optimizing fat loss. Strength training is particularly important for building and maintaining muscle mass. Exercise helps reduce stress, increase energy, and improves fitness. Increasing exercise without diet control, however, may not burn enough calories to loose weight.       Start walking three days a week 10-20 minutes at a time    Work towards walking thirty minutes five days a week     Eventually, increase the speed of your walking for 1-2 minutes at time    In addition, we recommend that you review healthy lifestyles and methods for weight loss available through the National Institutes of Health patient information sites:  http://win.niddk.nih.gov/publications/index.htm    And look into health and wellness programs that may be available through your health insurance provider, employer, local community center, or [employer].    Weight management plan: Patient was referred to their PCP to discuss a diet and exercise plan.            Follow-ups after your visit        Follow-up notes from your care team     Return in about 2 years (around 4/19/2019).      Your next 10 appointments already scheduled     Apr 25, 2017  9:30 AM CDT   Return Visit with Harper Carrillo PT   [facility] [place] ([facility])    [address] [address]   [phone]            May 01, 2017 10:45 AM CDT   Radiology Injections with Mariusz Dallas MD   [facility] ([facility])    [address] [address]   [phone]            May 02, 2017  9:30 AM CDT   Return Visit with Harper Carrillo PT   [facility] ([facility])    [address]  [place] [address]   [phone]            May 16, 2017  1:30 PM CDT   Return Visit with DIMITRI Corona CNP   [facility] ([facility])    [address]  [place] [address]   [phone]            May 24, 2017 11:30 AM CDT   MyChart Injury Follow Up with M[facility]z [facility]donez [facility]   [facility]    [address] [address] [phone]              Who to contact     If you have questions or need follow up information about today's clinic visit or your schedule please contact [facility] directly at [phone].  Normal or non-critical lab and imaging results will be communicated to you by MyChart, letter or phone within 4 business days after the clinic has received the results. If you do not hear from us within 7 days, please contact the clinic through Oxynadet or phone. If you have a critical or abnormal lab result, we will notify you by phone as soon as possible.  Submit refill requests through Instapage or call your pharmacy and they will forward the refill request to us. Please allow 3 business days for your refill to be completed.          Additional Information About Your Visit        MyChart Information     Instapage gives you secure access to your electronic health record. If you see a primary care provider, you can also send messages to your care team and make appointments. If you have questions, please call your primary care clinic.  If you do not have a primary care provider, please call [phone] and they will assist you.        Care EveryWhere ID     This is your Care EveryWhere ID. This could be used by other organizations to access your [facility] medical records  [id number]        Your Vitals Were     Pulse Height Pulse Oximetry BMI (Body Mass Index)          68 1.803 m (5' 11\") 98% 30.27 kg/m2         Blood " Pressure from Last 3 Encounters:   04/19/17 120/75   03/15/17 114/78   03/07/17 122/77    Weight from Last 3 Encounters:   04/19/17 98.4 kg (217 lb)   03/15/17 98.8 kg (217 lb 12.8 oz)   03/07/17 100.2 kg (221 lb)              We Performed the Following     Comprehensive DME        Primary Care Provider Office Phone # Fax #    Maribel [place] [phone]       [place] [facility] [address] [place] [address]        Thank you!     Thank you for choosing [facility]  for your care. Our goal is always to provide you with excellent care. Hearing back from our patients is one way we can continue to improve our services. Please take a few minutes to complete the written survey that you may receive in the mail after your visit with us. Thank you!             Your Updated Medication List - Protect others around you: Learn how to safely use, store and throw away your medicines at www.disposemymeds.org.          This list is accurate as of: 4/19/17 11:28 AM.  Always use your most recent med list.                   Brand Name Dispense Instructions for use    alfuzosin 10 MG 24 hr tablet    UROXATRAL     Take 10 mg by mouth       ASPIRIN EC PO      Take 81 mg by mouth daily       atorvastatin 40 MG tablet    LIPITOR         latanoprost 0.005 % ophthalmic solution    XALATAN         LORazepam 1 MG tablet    ATIVAN    30 tablet    Take 0.5-1 tablets (0.5-1 mg) by mouth every 8 hours as needed for anxiety       order for DME      Respironics REMSTAR 60 Series Auto CPAP 7cm H2O, Airfit P10 nasal pillow mask w/medium pillows       traZODone 150 MG tablet    DESYREL    30 tablet    Take 1 tablet (150 mg) by mouth nightly as needed for sleep Needs appointment for further refills.

## 2017-04-19 NOTE — NURSING NOTE
"Chief Complaint   Patient presents with     RECHECK     cpap f/u       Initial There were no vitals taken for this visit. Estimated body mass index is 30.38 kg/(m^2) as calculated from the following:    Height as of 3/15/17: 1.803 m (5' 11\").    Weight as of 3/15/17: 98.8 kg (217 lb 12.8 oz).  Medication Reconciliation: complete    "

## 2017-04-19 NOTE — TELEPHONE ENCOUNTER
Sounds like he decided not to schedule when the  called him to do so.   Thanks.  Jenna MOSLEY RN CNP, FNP  [facility] [place]

## 2017-04-24 NOTE — TELEPHONE ENCOUNTER
Per patient myChart message:  Marco A Frank.  What pharmacy would you like the cymbalta sent to?    Frida Finney RN-BSN  [facility]

## 2017-04-25 ENCOUNTER — OFFICE VISIT (OUTPATIENT)
Dept: PALLIATIVE MEDICINE | Facility: CLINIC | Age: 57
End: 2017-04-25
Payer: COMMERCIAL

## 2017-04-25 DIAGNOSIS — G89.29 CHRONIC PAIN OF LEFT ANKLE: ICD-10-CM

## 2017-04-25 DIAGNOSIS — M79.18 MYOFASCIAL PAIN: ICD-10-CM

## 2017-04-25 DIAGNOSIS — G90.522 COMPLEX REGIONAL PAIN SYNDROME TYPE 1 OF LEFT LOWER EXTREMITY: Primary | ICD-10-CM

## 2017-04-25 DIAGNOSIS — M25.572 CHRONIC PAIN OF LEFT ANKLE: ICD-10-CM

## 2017-04-25 DIAGNOSIS — M25.50 MULTIPLE JOINT PAIN: ICD-10-CM

## 2017-04-25 PROCEDURE — 97112 NEUROMUSCULAR REEDUCATION: CPT | Mod: GP | Performed by: PHYSICAL THERAPIST

## 2017-04-25 PROCEDURE — 97110 THERAPEUTIC EXERCISES: CPT | Mod: GP | Performed by: PHYSICAL THERAPIST

## 2017-04-25 NOTE — TELEPHONE ENCOUNTER
My chart message from pt:    [place] pharmacy Thanks Zac     Sending to nurse pool for now as Jenna Parrsih out today.     Juliane Hicks RN, [facility]  Pain Clinic Care Coordinator

## 2017-04-25 NOTE — PROGRESS NOTES
"Pain Physical Therapy Progress Note    Patient Name: Mata Poe     YOB: 1960     Medical Record Number: [id number]    Visits: 4/12    Diagnosis:    Complex regional pain syndrome type 1 of left lower extremity  Myofascial pain  Chronic pain of left ankle  Multiple joint pain    Subjective: Patient reports that he didn't sleep well; pain in the L foot.    Decided that he didn't want to follow up with health psych, as he is seeing his own therapist; \"I don't want to paid twice\"    Frustrated with pain in foot; nervous about block--recommend that patient call nursing line if having questions.    Thinks that there are other issues going on in his ankle with the metal; will follow up with provider re: second opinion.    Self Care  HEP: doing magazine and visualization exercises  Walking/Aerobic Activity: states that he would like to get back to walking and biking more--can do it for about 15 mins or more--\"if I push through it\"  Heat/Ice: using hot tub for L LE; feels good with rest of body too  Posture: NA  Mini Breaks: NA  Breathing/Relaxation: states that he has tried stress management CD and has liked it; does work on mediation with podcasts  Pacing: NA    Objective Findings:   OBSERVATION: Appears anxious, but less than last visit, Guarded posture; forward head; hiked shoulders; arms across chest; clenching jaw and fists, poor core engagement with seated posture; upper chest breathing noted  GAIT & LOCOMOTION: Antalgic: moving slowly, guarded, minimal arm movement, decreased heel to toe gait and knee flexion on L; leaning to the R    Treatment Interventions:  Therapeutic Procedures/Exercises: encouraged patient to continue with visualization exercise; discussed working on increasing aerobic activity; need to be aware of overdoing it; not to \"push through it\" and have increased pain; does have access to pool, may benefit from being in water--not having weight on L LE; discussed how easy to " overdo in water because of difference in gravity; may want to try water walking, being in deeper end, etc.  Does have hot tub that is helpful---could try marching in there    Neuromuscular Reeducation:   Therapeutic Neuroscience Education: Your Brain and Nerve Sensitivity: Patient was educated about nerve sensitivity caused by central sensitization and driven by both biological and psychosocial factors.  The patient was encouraged to identify personal stressors which contribute to pain and to discuss this with PT for guidance and plan to move ahead.  Gave Handout.  Reviewed diaphragmatic breathing; Used Hoberman sphere to help educate on breathing technique/trunk mobility.    Discussed importance of pacing of activity; working on being more aware of body tension; how overdoing things can increase pain, increase nerve sensitivity; encouraged patient to work on relaxation/.breathing more regularly during the day--can to small bits at a time.  __________________________________________________________________    Instruction on home program: TNE    Assessment:  Ongoing Functional Limitations Include:  Patient tolerated/responded well to treatment  Impression: slowly working on education of pain PT/nerve sensitivity; relaxation    Recommendations: continue    Intensity Level: 1 (1=low intensity; 5=high intensity)    Demonstrates/Verbalizes Technique: 2 (1= poor technique-difficulty performing exercises,significant cues required; 5= good technique-performs exercises without cues)    Body Awareness: 2 (1=low awareness; 5=high awareness)    Posture/Stability: 2 (1= poor posture, stability; 5= good posture, stability)    Motivational Level: Cooperative and Distracted, anxious    Response to Teaching: cooperative and needs reinforcement    Factors that affect learning: None    _______________________________________________________________________  Plan of Care   Continue PT to support reactivation and integration of self  regulation pain management skills;  Focus of next session will be on: TNE, GMI, HEP as able     Present:  NA     Total Visit Time:  35 minutes; patient late   Neuro-Re Ed: 20 mins; TE: 15 mins    Therapist: Harper Carrillo PT             Date: 4/25/2017

## 2017-04-25 NOTE — MR AVS SNAPSHOT
After Visit Summary   4/25/2017    Mata Poe    MRN: [id number]           Patient Information     Date Of Birth          1960        Visit Information        Provider Department      4/25/2017 9:30 AM Harper Carrillo, PT [facility]        Today's Diagnoses     Complex regional pain syndrome type 1 of left lower extremity    -  1    Myofascial pain        Chronic pain of left ankle        Multiple joint pain           Follow-ups after your visit        Your next 10 appointments already scheduled     May 01, 2017 10:45 AM CDT   Radiology Injections with Mariusz Dallas MD   [facility] ([facility])    [address] [address]   [phone]            May 04, 2017 11:00 AM CDT   Return Visit with Harper Carrillo PT   [facility] ([facility])    [address] [address]   [phone]            May 16, 2017  1:30 PM CDT   Return Visit with DIMITRI Corona [facility] ([facility])    [address] [address]   [phone]            May 24, 2017 11:30 AM CDT   MyChart Injury Follow Up with Mariusz Ordonez MD   [facility] ([facility])    [address] [address] [phone]              Who to contact     If you have questions or need follow up information about today's clinic visit or your schedule please contact [facility] directly at [phone].  Normal or non-critical lab and imaging results will be communicated to you by MyChart, letter or phone within 4 business days after the clinic has received the results. If you do not hear from us within 7 days, please contact the clinic through MyChart or phone. If you have a critical or abnormal lab result, we will notify you by phone as soon as possible.  Submit  refill requests through PlayerPro or call your pharmacy and they will forward the refill request to us. Please allow 3 business days for your refill to be completed.          Additional Information About Your Visit        Lug[employer] Software[employer] Information     PlayerPro gives you secure access to your electronic health record. If you see a primary care provider, you can also send messages to your care team and make appointments. If you have questions, please call your primary care clinic.  If you do not have a primary care provider, please call [phone] and they will assist you.        Care EveryWhere ID     This is your Care EveryWhere ID. This could be used by other organizations to access your [place] medical records  [id number]         Blood Pressure from Last 3 Encounters:   04/19/17 120/75   03/15/17 114/78   03/07/17 122/77    Weight from Last 3 Encounters:   04/19/17 98.4 kg (217 lb)   03/15/17 98.8 kg (217 lb 12.8 oz)   03/07/17 100.2 kg (221 lb)              We Performed the Following     NEUROMUSCULAR RE-EDUCATION     THERAPEUTIC EXERCISES        Primary Care Provider Office Phone # Fax #    Maribel [place] [phone]       [facility] [address]   [place] [address]        Thank you!     Thank you for choosing [facility]  for your care. Our goal is always to provide you with excellent care. Hearing back from our patients is one way we can continue to improve our services. Please take a few minutes to complete the written survey that you may receive in the mail after your visit with us. Thank you!             Your Updated Medication List - Protect others around you: Learn how to safely use, store and throw away your medicines at www.disposemymeds.org.          This list is accurate as of: 4/25/17 10:51 AM.  Always use your most recent med list.                   Brand Name Dispense Instructions for use    alfuzosin 10 MG 24 hr tablet    UROXATRAL     Take 10 mg by  mouth       ASPIRIN EC PO      Take 81 mg by mouth daily       atorvastatin 40 MG tablet    LIPITOR         latanoprost 0.005 % ophthalmic solution    XALATAN         LORazepam 1 MG tablet    ATIVAN    30 tablet    Take 0.5-1 tablets (0.5-1 mg) by mouth every 8 hours as needed for anxiety       order for DME      Respironics REMSTAR 60 Series Auto CPAP 7cm H2O, Airfit P10 nasal pillow mask w/medium pillows       traZODone 150 MG tablet    DESYREL    30 tablet    Take 1 tablet (150 mg) by mouth nightly as needed for sleep Needs appointment for further refills.

## 2017-04-25 NOTE — TELEPHONE ENCOUNTER
Routed to Jenna Parrish NP to review and sign off on new cymbalta med.    Frida Finney RN-BSN  [facility]

## 2017-04-26 RX ORDER — DULOXETIN HYDROCHLORIDE 30 MG/1
CAPSULE, DELAYED RELEASE ORAL
Qty: 30 CAPSULE | Refills: 0 | Status: SHIPPED | OUTPATIENT
Start: 2017-04-26 | End: 2017-05-16

## 2017-04-26 NOTE — TELEPHONE ENCOUNTER
Signed Prescriptions:                        Disp   Refills    DULoxetine (CYMBALTA) 30 MG EC capsule     30 cap*0        Sig: Take 30mg (1 capsule per day) in the morning. If it           makes you sleepy, then take this at bedtime           instead.  Authorizing Provider: MARYCRUZ MCDERMOTT, RN CNP, FNP  [place] [facility]

## 2017-05-01 ENCOUNTER — RADIANT APPOINTMENT (OUTPATIENT)
Dept: RADIOLOGY | Facility: CLINIC | Age: 57
End: 2017-05-01
Attending: ANESTHESIOLOGY
Payer: COMMERCIAL

## 2017-05-01 ENCOUNTER — RADIOLOGY INJECTION OFFICE VISIT (OUTPATIENT)
Dept: PALLIATIVE MEDICINE | Facility: CLINIC | Age: 57
End: 2017-05-01
Attending: NURSE PRACTITIONER
Payer: COMMERCIAL

## 2017-05-01 VITALS
OXYGEN SATURATION: 97 % | SYSTOLIC BLOOD PRESSURE: 117 MMHG | HEART RATE: 79 BPM | DIASTOLIC BLOOD PRESSURE: 75 MMHG | RESPIRATION RATE: 20 BRPM

## 2017-05-01 DIAGNOSIS — G89.4 CHRONIC PAIN SYNDROME: Primary | Chronic | ICD-10-CM

## 2017-05-01 DIAGNOSIS — G90.522 COMPLEX REGIONAL PAIN SYNDROME TYPE 1 OF LEFT LOWER EXTREMITY: ICD-10-CM

## 2017-05-01 DIAGNOSIS — G90.521 COMPLEX REGIONAL PAIN SYNDROME TYPE 1 OF RIGHT LOWER EXTREMITY: ICD-10-CM

## 2017-05-01 PROCEDURE — 64520 N BLOCK LUMBAR/THORACIC: CPT | Mod: LT | Performed by: ANESTHESIOLOGY

## 2017-05-01 ASSESSMENT — PAIN SCALES - GENERAL: PAINLEVEL: WORST PAIN (10)

## 2017-05-01 NOTE — NURSING NOTE
"Chief Complaint   Patient presents with     Pain       Initial /78  Pulse 76 Estimated body mass index is 30.27 kg/(m^2) as calculated from the following:    Height as of 4/19/17: 1.803 m (5' 11\").    Weight as of 4/19/17: 98.4 kg (217 lb).  Medication Reconciliation: complete     Injection intake:    If this procedure is requiring IV sedation has patient been NPO for 6  Hours? Yes    Is patient on coumadin, plavix or other prescribed blood thinner?   No    If patient is on coumadin was it held for 5 days?   NA    If patient is on plavix was it held for 7 days?    NA     Does patient take aspirin?  No    If this is for a cervical procedure and patient is on aspirin has it been held for 6 days?   NA    Any allergies to contrast dye, iodine, steroid and/or numbing medications?  NO    Is patient currently taking antibiotics or have an active infection?  NO    Does patient have a ? Yes       Is patient pregnant or breastfeeding?  NO    Are the vital signs normal?  Yes    Kareen Cavanaugh CMA ([facility])        "

## 2017-05-01 NOTE — NURSING NOTE
MD Time IN: 1151   Sedation start time:  1153  MD Time OUT:  1212    Medications given: fentanyl 0 mcg IV; versed 1 mg IV  Intravenous fluids were administered, normal saline saline locked cc's.  Sedation Level Achieved:  Moderate (conscious) sedation    Frida Finney RN-BSN  [facility]

## 2017-05-01 NOTE — MR AVS SNAPSHOT
After Visit Summary   5/1/2017    Mata Poe    MRN: [id number]           Patient Information     Date Of Birth          1960        Visit Information        Provider Department      5/1/2017 10:45 AM Mariusz Nixon MD [facility]        Care Instructions    [facility]   Procedure Discharge Instructions    Today you saw:    Dr. Mariusz Dallas     You had an:  Left-sided lumbar sympathetic block     Medications used:  Lidocaine   Bupivacaine   Omnipaque     Do some activities today to see if you have pain relief.  Did you have pain relief and if so for how long?  What percentage of relief did you have during that time?    Call the nurse line in a couple of days w/ an update.  The next step will be determined based on your response to the injection.            You received IV versed during your  Procedure which is a sedation medication, for the next 24 hours you shall NOT:   -Drive  -Operate machinery  -Drink alcohol  -Sign any legal documents    Be cautious when walking. Numbness and/or weakness in the lower extremities may occur up to 6-8 hours after the procedure due to effect of the local anesthetic    Do not drive for 6 hours. The effect of the local anesthetic could slow your reflexes.     You may resume your regular activities after 24 hours    Avoid strenuous activity for the first 24 hours    You may shower, however avoid swimming, tub baths or hot tubs for 24 hours following your procedure    You may have a mild to moderate increase in pain for several days following the injection.    It may take up to 14 days for the steroid medication to start working although you may feel the effect as early as a few days after the procedure.       You may use ice packs for 10-15 minutes, 3 to 4 times a day at the injection site for comfort    Do not use heat to painful areas for 6 to 8 hours. This will give the local anesthetic time to wear off and  prevent you from accidentally burning your skin.     You may use anti-inflammatory medications (such as Ibuprofen or Aleve or Advil) or Tylenol for pain control if necessary    If you were fasting, you may resume your normal diet and medications after the procedure    If you have diabetes, check your blood sugar more frequently than usual as your blood sugar may be higher than normal for 10-14 days following a steroid injection. Contact your doctor who manages your diabetes if your blood sugar is higher than usual    If you experience any of the following, call the pain center nursing line during work hours at [phone] or the after hours provider line at [phone]:  -Fever over 100 degree F  -Swelling, bleeding, redness, drainage, warmth at the injection site  -Progressive weakness or numbness in your legs or arms  -Loss of bowel or bladder function  -Unusual headache that is not relieved by Tylenol  -Unusual new onset of pain that is not improving      Phone #s:  Appointment line: [phone];  Nurse line: [phone]            Follow-ups after your visit        Your next 10 appointments already scheduled     May 04, 2017 11:00 AM CDT   Return Visit with Harper Carrillo PT   [facility] ([facility])    [address] [id number] [phone]            May 16, 2017  1:30 PM CDT   Return Visit with DIMITRI Corona CNP   [facility] ([facility])    [address] [id number] [phone]            May 24, 2017 11:30 AM CDT   MyChart Injury Follow Up with Mariusz Ordonez MD   [facility] ([facility])    [address] [address] [phone]              Who to contact     If you have questions or need follow up information about today's clinic visit or your schedule please contact [facility] directly at  [phone].  Normal or non-critical lab and imaging results will be communicated to you by MyChart, letter or phone within 4 business days after the clinic has received the results. If you do not hear from us within 7 days, please contact the clinic through [employer] or phone. If you have a critical or abnormal lab result, we will notify you by phone as soon as possible.  Submit refill requests through [employer] or call your pharmacy and they will forward the refill request to us. Please allow 3 business days for your refill to be completed.          Additional Information About Your Visit        [employer] Information     [employer] gives you secure access to your electronic health record. If you see a primary care provider, you can also send messages to your care team and make appointments. If you have questions, please call your primary care clinic.  If you do not have a primary care provider, please call [phone] and they will assist you.        Care EveryWhere ID     This is your Care EveryWhere ID. This could be used by other organizations to access your [facility] medical records  [id number]        Your Vitals Were     Pulse Respirations Pulse Oximetry             79 20 97%          Blood Pressure from Last 3 Encounters:   05/01/17 117/75   04/19/17 120/75   03/15/17 114/78    Weight from Last 3 Encounters:   04/19/17 98.4 kg (217 lb)   03/15/17 98.8 kg (217 lb 12.8 oz)   03/07/17 100.2 kg (221 lb)              Today, you had the following     No orders found for display       Primary Care Provider Office Phone # Fax #    Maribel Lancaster [phone]       [place] [facility] [address] [place] [address]        Thank you!     Thank you for choosing [facility]  for your care. Our goal is always to provide you with excellent care. Hearing back from our patients is one way we can continue to improve our services. Please take a few minutes to complete the written survey that  you may receive in the mail after your visit with us. Thank you!             Your Updated Medication List - Protect others around you: Learn how to safely use, store and throw away your medicines at www.disposemymeds.org.          This list is accurate as of: 5/1/17 12:18 PM.  Always use your most recent med list.                   Brand Name Dispense Instructions for use    alfuzosin 10 MG 24 hr tablet    UROXATRAL     Take 10 mg by mouth       ASPIRIN EC PO      Take 81 mg by mouth daily Reported on 5/1/2017       atorvastatin 40 MG tablet    LIPITOR         DULoxetine 30 MG EC capsule    CYMBALTA    30 capsule    Take 30mg (1 capsule per day) in the morning. If it makes you sleepy, then take this at bedtime instead.       latanoprost 0.005 % ophthalmic solution    XALATAN         LORazepam 1 MG tablet    ATIVAN    30 tablet    Take 0.5-1 tablets (0.5-1 mg) by mouth every 8 hours as needed for anxiety       order for [facility]      Respironics REMSTAR 60 Series Auto CPAP 7cm H2O, Airfit P10 nasal pillow mask w/medium pillows       traZODone 150 MG tablet    DESYREL    30 tablet    Take 1 tablet (150 mg) by mouth nightly as needed for sleep Needs appointment for further refills.

## 2017-05-01 NOTE — NURSING NOTE
Discharge Information    IV Discontiued Time:  1229 catheter intact    Amount of Fluid Infused:  Saline locked    Discharge Criteria = When patient returns to baseline or as per MD order    Consciousness:  Pt is fully awake    Circulation:  BP +/- 20% of pre-procedure level    Respiration:  Patient is able to breathe deeply    O2 Sat:  Patient is able to maintain O2 Sat >92% on room air    Activity:  Moves 4 extremities on command    Ambulation:  Patient is able to stand and walk or stand and pivot into wheelchair    Dressing:  Clean/dry or No Dressing    Notes:   Discharge instructions and AVS given to patient    Patient meets criteria for discharge?  YES    Admitted to PCU?  No    Responsible adult present to accompany patient home?  Yes    Signature/Title:    paul rios RN Care Coordinator  [facility]

## 2017-05-01 NOTE — PATIENT INSTRUCTIONS
[facility]   Procedure Discharge Instructions    Today you saw:    Dr. Mariusz Dallas     You had an:  Left-sided lumbar sympathetic block     Medications used:  Lidocaine   Bupivacaine   Omnipaque     Do some activities today to see if you have pain relief.  Did you have pain relief and if so for how long?  What percentage of relief did you have during that time?    Call the nurse line in a couple of days w/ an update.  The next step will be determined based on your response to the injection.            You received IV versed during your  Procedure which is a sedation medication, for the next 24 hours you shall NOT:   -Drive  -Operate machinery  -Drink alcohol  -Sign any legal documents    Be cautious when walking. Numbness and/or weakness in the lower extremities may occur up to 6-8 hours after the procedure due to effect of the local anesthetic    Do not drive for 6 hours. The effect of the local anesthetic could slow your reflexes.     You may resume your regular activities after 24 hours    Avoid strenuous activity for the first 24 hours    You may shower, however avoid swimming, tub baths or hot tubs for 24 hours following your procedure    You may have a mild to moderate increase in pain for several days following the injection.    It may take up to 14 days for the steroid medication to start working although you may feel the effect as early as a few days after the procedure.       You may use ice packs for 10-15 minutes, 3 to 4 times a day at the injection site for comfort    Do not use heat to painful areas for 6 to 8 hours. This will give the local anesthetic time to wear off and prevent you from accidentally burning your skin.     You may use anti-inflammatory medications (such as Ibuprofen or Aleve or Advil) or Tylenol for pain control if necessary    If you were fasting, you may resume your normal diet and medications after the procedure    If you have diabetes, check your blood  sugar more frequently than usual as your blood sugar may be higher than normal for 10-14 days following a steroid injection. Contact your doctor who manages your diabetes if your blood sugar is higher than usual    If you experience any of the following, call the pain center nursing line during work hours at [phone] or the after hours provider line at [phone]:  -Fever over 100 degree F  -Swelling, bleeding, redness, drainage, warmth at the injection site  -Progressive weakness or numbness in your legs or arms  -Loss of bowel or bladder function  -Unusual headache that is not relieved by Tylenol  -Unusual new onset of pain that is not improving      Phone #s:  Appointment line: [phone];  Nurse line: [phone]

## 2017-05-01 NOTE — PROGRESS NOTES
Pre procedure Diagnosis: CRPS of the left lower extremity  Post proceudure Diagnosis:  Same  Procedure performed: Lumbar sympathetic block at L3 level on the left side under fluorsoscopic guidance, contrast controlled  Indication:  Diagnostic/therapeutic  Anesthesia: Versed 1 mg IV for anxiolysis  Complications: none  Operators: Mariusz Dallas MD     INDICATION:  Mata Poe is a 57 year old year old male is here today for a lumbar sympathetic block for management of his chronic left lower extremity pain due to CRPS. He had significant hypersensitivity and allodynia on the left leg and foot from the knee down, worse on the medial aspect with  A delvis coloration, mildly decreased temperature on the left.  Hair growth and skin texture relatively equal bilaterally.     Options, benefits, and risks were discussed with the patient including but not limited to bleeding, infection, lack of pain relief, reaction to medications, internal organ injury, seizure, stroke, paralysis, nerve injury, spinal cord injury, spinal headache, spinal fluid leak, coma, death. Questions were answered to his satisfaction and he wishes to proceed. Voluntary signed, informed consent was obtained and signed.     Vitals reviewed: Yes  /78  Pulse 76  Medications reviewed: Yes  Allergies reviewed: Yes  Pre-procedure pain: 10/10     PROCEDURE IN DETAIL:   After obtaining signed informed consent, the patient was brought to the procedure suite. Patient was placed in a prone position on the procedure table. Patient's low back area was prepped and draped in the usual sterile fashion.      Under fluoroscopic guidance the L3 vertebral body was identified. The C arm was then rotated oblique to the left and tilted slightly caudal. 2 cc of 1% lidocaine was injected at the needle entry point and needle tract using a 30 gauge 1 inch needle. Then a 22 gauge 7 inch quincke type spinal needle was inserted and advanced under fluoroscopic  guidance targeting the lateral edge of the L3 vertebral body. Once bony contact was achieved, the needle bevel was rotated and needle was advanced towards the anterolateral edge of the L3 vertebral body under fluoroscopic guidance. Lateral and AP fluoroscopic views were obtained during needle advancement to confirm needle depth and position.      Then 2 cc of Omnipaque 300 contrast dye was injected after negative aspiration for heme and CSF. There was no vascular uptake and the contrast had a nice spread along the anterolateral aspect  of the L3 vertebral body with some cephalad and caudal spread along the L2 and L4 vertebral bodies. A test dose was then performed utilizing 3 ml of Lidocaine 1% with epinephrine which was negative for adverse effects. Then, after repeated negative aspiration, the injection was then accomplished with 10 cc of 0.2 % Ropivacaine with intermittent negative aspiration. The needle was then flushed with Lidocaine 1% as it was withdrawn. The injection site was cleaned and a sterile dressing applied. Omnipaque wasted: 8 ml.     The patient tolerated the procedure well without complications. He was taken to the recovery area for continued monitoring and subsequently discharged to home in good condition.     Post Procedure: In the recovery area, we observed increased temperature on the left lower extremity indicating successful sympathetic block.  The patient reported decreased sensitivity and improvement of his usual left leg and foot pain but continued with a burning sensation in the toes.      Pre-procedure pain intensity scaore was 10/10 and post-procedure pain intenisty score was 6/10 in the toes.      He was observed for 20 minutes following the procedure and was then discharged in good and stable condition with his . Patient will continue to monitor progress and we will see patient again for follow up in 2-3 weeks.     Follow up includes:  - nurse call at 1 week  post-procedure  - follow up with referring provider  - repeat sympathetic blocks will have to be performed weekly or every other week to get the best benefit  - patient was given educational DVDs about Spinal Cord Stimulation as a possible treatment option        Mariusz Dallas MD  [facility]

## 2017-05-02 ENCOUNTER — TELEPHONE (OUTPATIENT)
Dept: PALLIATIVE MEDICINE | Facility: CLINIC | Age: 57
End: 2017-05-02

## 2017-05-02 NOTE — TELEPHONE ENCOUNTER
Dr. Cynthia Dallas your next available injection slot is 5/23/17.  For some reason 'urgent slots' are no longer templated.  A message was sent about this.    Is 5/23/17 okay?    Frida Finney RN-BSN  [facility]

## 2017-05-02 NOTE — TELEPHONE ENCOUNTER
Called pt. Had LSB yesterday morning. Mainly concerned about continued numbness in the groin area. All other areas of numbness following the injection are back to normal.   Any new numbness/tingling?: Yes. Details: still having numbness in the groin area  Any weakness?  No  Any bowel or bladder issues?: No  Any New pain areas?: No; still a little sore around the injection site  Signs of infection? No  Fever/chills:  No  Pain relief from the injection? Had some relief right after the injection when the touch to his ankle, foot and leg felt a little better.  States that when the numbing medication wore off, the pain is back as it was before the injection.    Let him know that the continued numbness in that area is likely nothing out of the ordinary, I would have Dr. Cynthia Dallas review and would get back to him with his response.     STEFANIE DIAZ  RN Care Coordinator  [facility]

## 2017-05-02 NOTE — TELEPHONE ENCOUNTER
Patient would like to speak to a nurse, he is having numbness in his groin area after his LSB yesterday.      Ibeth ROMERO    [facility]

## 2017-05-02 NOTE — TELEPHONE ENCOUNTER
Information noted.  The numbness will resolve in time.  Due to return of pain will need to schedule another sympathetic block.    Mariusz Dallas MD  [facility]

## 2017-05-02 NOTE — TELEPHONE ENCOUNTER
Schedule him for that date but if there is a cancellation we can get him in sooner.    Mariusz Dallas MD  [facility]

## 2017-05-04 ENCOUNTER — OFFICE VISIT (OUTPATIENT)
Dept: PALLIATIVE MEDICINE | Facility: CLINIC | Age: 57
End: 2017-05-04
Payer: COMMERCIAL

## 2017-05-04 DIAGNOSIS — G89.29 CHRONIC PAIN OF LEFT ANKLE: ICD-10-CM

## 2017-05-04 DIAGNOSIS — M25.50 MULTIPLE JOINT PAIN: ICD-10-CM

## 2017-05-04 DIAGNOSIS — G90.522 COMPLEX REGIONAL PAIN SYNDROME TYPE 1 OF LEFT LOWER EXTREMITY: Primary | ICD-10-CM

## 2017-05-04 DIAGNOSIS — M25.572 CHRONIC PAIN OF LEFT ANKLE: ICD-10-CM

## 2017-05-04 DIAGNOSIS — M79.18 MYOFASCIAL PAIN: ICD-10-CM

## 2017-05-04 PROCEDURE — 97112 NEUROMUSCULAR REEDUCATION: CPT | Performed by: PHYSICAL THERAPIST

## 2017-05-04 NOTE — PROGRESS NOTES
"Pain Physical Therapy Progress Note    Patient Name: Mata Poe     YOB: 1960     Medical Record Number: [id number]    Visits: 5/12    Diagnosis:    Complex regional pain syndrome type 1 of left lower extremity  Myofascial pain  Chronic pain of left ankle  Multiple joint pain    Subjective: Patient reports that the block went OK.  Was really anxious before it.  Did have less burning pain in foot/ankle until the end of day; came back the next day to previous levels.    Was able to put sock and shoe on with less pain/burning afterwards.    Is giving the Cymbalta a try    Frustrated that he didn't get help sooner; does have appointment with Dr. Messer in a week or so; has been seeing therapist usually every week; \"my wife has been worried about me for a long time\"    Feels like there is a little improvement overall since January, but not a lot.    Self Care  HEP: doing magazine and visualization; wondering if it's helping  Walking/Aerobic Activity: hasn't done any yet; would like to get into pool or biking  Heat/Ice: using the hot tub; does stretches  Posture: NA  Mini Breaks: NA  Breathing/Relaxation: mediation with podcasts;   Pacing: NA    Objective Findings:    OBSERVATION: Appears anxious, but has been less the last couple of visits, Guarded posture; forward head; hiked shoulders; arms across chest; clenching jaw and fists, poor core engagement with seated posture; upper chest breathing noted  GAIT & LOCOMOTION: Antalgic: moving slowly, guarded, minimal arm movement, decreased heel to toe gait and knee flexion on L; leaning to the R    Treatment Interventions:  Neuromuscular Reeducation:   Reviewed with patient what to expect with block; recommend follow up with nursing with concerns; discussed anixety about procedure; how increased pain in foot; how anixety/stress can cause increase in pain; how nervous system/sensitivity is affected.  Therapeutic Neuroscience Education: Pain and the " Brain: Patient was educated on the concept of pain as an output of the brain, including nociception vs pain, inhibition and facilitation, and threat value using metaphors to promote deep learning.  Reviewed how CPRS affects body; GMI treatment; need patience; doesn't happen right away; may be harder due to longer time since injury; reviewed Brain Body Map: Homunculus: Patient educated regarding the role of the primary somatosensory cortex in pain and body maps (which depends on movement and use of body parts) and that for people with persistent pain, decreased use of the body and other biological processes change the body maps.  Laterality proficiency was discussed.  Reviewed importance of working on relaxation; working on exercises--magazines, visualization, marching in hot tub, etc  __________________________________________________________________    Assessment:  Ongoing Functional Limitations Include:  Patient tolerated/responded well to treatment  Impression: needs a lot of reinforcement with treatment plan; frustrated with self for not having treatment in past    Recommendations: continue, would benefit from health psych    Intensity Level: 1 (1=low intensity; 5=high intensity)    Demonstrates/Verbalizes Technique: 2 (1= poor technique-difficulty performing exercises,significant cues required; 5= good technique-performs exercises without cues)    Body Awareness: 1 (1=low awareness; 5=high awareness)    Posture/Stability: 1 (1= poor posture, stability; 5= good posture, stability)    Motivational Level: Cooperative and Distracted, anxious    Response to Teaching: cooperative and needs reinforcement    Factors that affect learning: None    _______________________________________________________________________  Plan of Care   Continue PT to support reactivation and integration of self regulation pain management skills;  Focus of next session will be on: pacing, self care     Present:  NA     Total  Visit Time:  40 minutes  Neuro-Re Ed: 40 mins      Therapist: Harper Carrillo PT             Date: 5/4/2017

## 2017-05-08 NOTE — TELEPHONE ENCOUNTER
Called pt  He wants to hold off on scheduling. He is a second opinion next week.  He also wants to see if the medication is helpful for him.    WANDAI FOR Dr. Cynthia Dallas.    Frida Finney RN-BSN  [facility]

## 2017-05-16 ENCOUNTER — OFFICE VISIT (OUTPATIENT)
Dept: PALLIATIVE MEDICINE | Facility: CLINIC | Age: 57
End: 2017-05-16
Payer: COMMERCIAL

## 2017-05-16 VITALS
DIASTOLIC BLOOD PRESSURE: 84 MMHG | SYSTOLIC BLOOD PRESSURE: 137 MMHG | BODY MASS INDEX: 30.4 KG/M2 | WEIGHT: 218 LBS | HEART RATE: 90 BPM

## 2017-05-16 DIAGNOSIS — G90.522 COMPLEX REGIONAL PAIN SYNDROME TYPE 1 OF LEFT LOWER EXTREMITY: Primary | ICD-10-CM

## 2017-05-16 DIAGNOSIS — M25.50 MULTIPLE JOINT PAIN: ICD-10-CM

## 2017-05-16 DIAGNOSIS — M25.572 CHRONIC PAIN OF LEFT ANKLE: ICD-10-CM

## 2017-05-16 DIAGNOSIS — M79.18 MYOFASCIAL PAIN: ICD-10-CM

## 2017-05-16 DIAGNOSIS — M79.2 NEUROPATHIC PAIN: ICD-10-CM

## 2017-05-16 DIAGNOSIS — G89.29 CHRONIC PAIN OF LEFT ANKLE: ICD-10-CM

## 2017-05-16 PROCEDURE — 99214 OFFICE O/P EST MOD 30 MIN: CPT | Performed by: NURSE PRACTITIONER

## 2017-05-16 PROCEDURE — 97112 NEUROMUSCULAR REEDUCATION: CPT | Mod: GP | Performed by: PHYSICAL THERAPIST

## 2017-05-16 PROCEDURE — 97530 THERAPEUTIC ACTIVITIES: CPT | Mod: GP | Performed by: PHYSICAL THERAPIST

## 2017-05-16 RX ORDER — DULOXETIN HYDROCHLORIDE 30 MG/1
CAPSULE, DELAYED RELEASE ORAL
Qty: 60 CAPSULE | Refills: 0 | Status: SHIPPED | OUTPATIENT
Start: 2017-05-16 | End: 2017-05-25

## 2017-05-16 ASSESSMENT — PAIN SCALES - GENERAL: PAINLEVEL: WORST PAIN (10)

## 2017-05-16 NOTE — MR AVS SNAPSHOT
After Visit Summary   5/16/2017    Mata Poe    MRN: [id number]           Patient Information     Date Of Birth          1960        Visit Information        Provider Department      5/16/2017 1:30 PM Jenna Parrish APRN [facility]        Today's Diagnoses     Complex regional pain syndrome type 1 of left lower extremity    -  1    Neuropathic pain        Chronic pain of left ankle          Care Instructions    PLAN:  1. Continue PHYSICAL THERAPY   2. Medications:    1. Increase Cymbalta to 60mg per day  3. Procedures: consider repeat lumbar sympathetic nerve block (call if you wish to proceed)  4. I recommend that you see Dr. Azeem Messer for a 2nd opinion  5. Follow-up with me in 8 weeks.       ----------------------------------------------------------------  Nurse Triage line:  [phone]   Call this number with any questions or concerns. You may leave a detailed message anytime. Calls are typically returned Monday through Friday between 8 AM and 4:30 PM. We usually get back to you within 2 business days depending on the issue/request.       Medication refills:    For non-narcotic medications, call your pharmacy directly to request a refill. The pharmacy will contact the [facility] for authorization. Please allow 3-4 days for these refills to be processed.     For narcotic refills, call the nurse triage line or send a [employer] (ML4D) message. Please contact us 7-10 days before your refill is due. The message MUST include the name of the specific medication(s) requested and how you would like to receive the prescription(s). The options are as follows:    Pain Clinic staff can mail the prescription to your pharmacy. Please tell us the name of the pharmacy.    You may pick the prescription up at the Pain Clinic (tell us the location) or during a clinic visit with your pain provider    Pain Clinic staff can deliver the prescription to the [place] pharmacy  in the clinic building. Please tell us the location.      Scheduling number: [phone].  Call this number to schedule or change appointments.    We believe regular attendance is key to your success in our program.    Any time you are unable to keep your appointment we ask that you call us at least 24 hours in advance to let us know. This will allow us to offer the appointment time to another patient.             Follow-ups after your visit        Your next 10 appointments already scheduled     May 24, 2017 11:30 AM CDT   [facility] with M[facility]z [facility]   [facility]    [address] [address] [phone]              Who to contact     If you have questions or need follow up information about today's clinic visit or your schedule please contact [facility] directly at [phone].  Normal or non-critical lab and imaging results will be communicated to you by MyChart, letter or phone within 4 business days after the clinic has received the results. If you do not hear from us within 7 days, please contact the clinic through [employer] or phone. If you have a critical or abnormal lab result, we will notify you by phone as soon as possible.  Submit refill requests through [employer]ia or call your pharmacy and they will forward the refill request to us. Please allow 3 business days for your refill to be completed.          Additional Information About Your Visit        MyChart Information     [employer] gives you secure access to your electronic health record. If you see a primary care provider, you can also send messages to your care team and make appointments. If you have questions, please call your primary care clinic.  If you do not have a primary care provider, please call [phone] and they will assist you.        Care EveryWhere ID     This is your Care EveryWhere ID. This could be used by other organizations to  access your [place] medical records  [id number]        Your Vitals Were     Pulse BMI (Body Mass Index)                90 30.4 kg/m2           Blood Pressure from Last 3 Encounters:   05/16/17 137/84   05/01/17 117/75   04/19/17 120/75    Weight from Last 3 Encounters:   05/16/17 98.9 kg (218 lb)   04/19/17 98.4 kg (217 lb)   03/15/17 98.8 kg (217 lb 12.8 oz)              Today, you had the following     No orders found for display         Today's Medication Changes          These changes are accurate as of: 5/16/17  2:24 PM.  If you have any questions, ask your nurse or doctor.               These medicines have changed or have updated prescriptions.        Dose/Directions    DULoxetine 30 MG EC capsule   Commonly known as:  CYMBALTA   This may have changed:  additional instructions   Used for:  Chronic pain of left ankle, Complex regional pain syndrome type 1 of left lower extremity, Neuropathic pain   Changed by:  Jenna Parrish APRN CNP        Take 60mg (2 capsules per day) in the morning. If it makes you sleepy, then take this at bedtime instead.   Quantity:  60 capsule   Refills:  0            Where to get your medicines      These medications were sent to [facility] - [place], [place] - [address] [place] [address]  [address] [address]    Hours:  Mon-Fri 8:30am-5:00pm Toll Free [phone] Phone:  [phone]     DULoxetine 30 MG EC capsule                Primary Care Provider Office Phone # Fax #    Maribel [place] [phone]       [facility] [address] [place] [facility] [address]        Thank you!     Thank you for choosing [facility]  for your care. Our goal is always to provide you with excellent care. Hearing back from our patients is one way we can continue to improve our services. Please take a few minutes to complete the written survey that you may receive in the mail after your visit with us. Thank you!              Your Updated Medication List - Protect others around you: Learn how to safely use, store and throw away your medicines at www.disposemymeds.org.          This list is accurate as of: 5/16/17  2:24 PM.  Always use your most recent med list.                   Brand Name Dispense Instructions for use    alfuzosin 10 MG 24 hr tablet    UROXATRAL     Take 10 mg by mouth       ASPIRIN EC PO      Take 81 mg by mouth daily Reported on 5/1/2017       atorvastatin 40 MG tablet    LIPITOR         DULoxetine 30 MG EC capsule    CYMBALTA    60 capsule    Take 60mg (2 capsules per day) in the morning. If it makes you sleepy, then take this at bedtime instead.       latanoprost 0.005 % ophthalmic solution    XALATAN         LORazepam 1 MG tablet    ATIVAN    30 tablet    Take 0.5-1 tablets (0.5-1 mg) by mouth every 8 hours as needed for anxiety       order for DME      Respironics REMSTAR 60 Series Auto CPAP 7cm H2O, Airfit P10 nasal pillow mask w/medium pillows       traZODone 150 MG tablet    DESYREL    30 tablet    Take 1 tablet (150 mg) by mouth nightly as needed for sleep Needs appointment for further refills.

## 2017-05-16 NOTE — MR AVS SNAPSHOT
After Visit Summary   5/16/2017    Mata Poe    MRN: [id number]           Patient Information     Date Of Birth          1960        Visit Information        Provider Department      5/16/2017 11:00 AM [facility] PT [facility]        Today's Diagnoses     Complex regional pain syndrome type 1 of left lower extremity    -  1    Myofascial pain        Chronic pain of left ankle        Multiple joint pain           Follow-ups after your visit        Your next 10 appointments already scheduled     May 24, 2017 11:30 AM CDT   MyChart Injury Follow Up with Mariusz Ordonez MD   [facility] ([facility])    [address] [address]   [phone]            Jul 24, 2017  9:30 AM CDT   Return Visit with Harper Carrillo PT   [facility] [place] ([facility])    [address]  [place] [address] [phone]              Who to contact     If you have questions or need follow up information about today's clinic visit or your schedule please contact [facility] directly at [phone].  Normal or non-critical lab and imaging results will be communicated to you by MyChart, letter or phone within 4 business days after the clinic has received the results. If you do not hear from us within 7 days, please contact the clinic through [employer] or phone. If you have a critical or abnormal lab result, we will notify you by phone as soon as possible.  Submit refill requests through [employer]tero or call your pharmacy and they will forward the refill request to us. Please allow 3 business days for your refill to be completed.          Additional Information About Your Visit        MyChart Information     [employer]o gives you secure access to your electronic health record. If you see a primary care provider, you can also send messages to your care team and make appointments. If you have  questions, please call your primary care clinic.  If you do not have a primary care provider, please call [phone] and they will assist you.        Care EveryWhere ID     This is your Care EveryWhere ID. This could be used by other organizations to access your [place] medical records  [id number]         Blood Pressure from Last 3 Encounters:   05/16/17 137/84   05/01/17 117/75   04/19/17 120/75    Weight from Last 3 Encounters:   05/16/17 98.9 kg (218 lb)   04/19/17 98.4 kg (217 lb)   03/15/17 98.8 kg (217 lb 12.8 oz)              We Performed the Following     NEUROMUSCULAR RE-EDUCATION     THERAPEUTIC ACTIVITIES          Today's Medication Changes          These changes are accurate as of: 5/16/17  2:59 PM.  If you have any questions, ask your nurse or doctor.               These medicines have changed or have updated prescriptions.        Dose/Directions    DULoxetine 30 MG EC capsule   Commonly known as:  CYMBALTA   This may have changed:  additional instructions   Used for:  Chronic pain of left ankle, Complex regional pain syndrome type 1 of left lower extremity, Neuropathic pain   Changed by:  Jenna Parrish APRN CNP        Take 60mg (2 capsules per day) in the morning. If it makes you sleepy, then take this at bedtime instead.   Quantity:  60 capsule   Refills:  0            Where to get your medicines      These medications were sent to [facility] - [place], [place] - [address]  [address] [facility] [address]    Hours:  Mon-Fri 8:30am-5:00pm Toll Free [phone] Phone:  [phone]     DULoxetine 30 MG EC capsule                Primary Care Provider Office Phone # Fax #    Maribel [place] [phone]       [facility] [address] [place] [facility] [address]        Thank you!     Thank you for choosing [facility]  for your care. Our goal is always to provide you with excellent care. Hearing back from  our patients is one way we can continue to improve our services. Please take a few minutes to complete the written survey that you may receive in the mail after your visit with us. Thank you!             Your Updated Medication List - Protect others around you: Learn how to safely use, store and throw away your medicines at www.disposemymeds.org.          This list is accurate as of: 5/16/17  2:59 PM.  Always use your most recent med list.                   Brand Name Dispense Instructions for use    alfuzosin 10 MG 24 hr tablet    UROXATRAL     Take 10 mg by mouth       ASPIRIN EC PO      Take 81 mg by mouth daily Reported on 5/1/2017       atorvastatin 40 MG tablet    LIPITOR         DULoxetine 30 MG EC capsule    CYMBALTA    60 capsule    Take 60mg (2 capsules per day) in the morning. If it makes you sleepy, then take this at bedtime instead.       latanoprost 0.005 % ophthalmic solution    XALATAN         LORazepam 1 MG tablet    ATIVAN    30 tablet    Take 0.5-1 tablets (0.5-1 mg) by mouth every 8 hours as needed for anxiety       order for [facility]      Respironics REMSTAR 60 Series Auto CPAP 7cm H2O, Airfit P10 nasal pillow mask w/medium pillows       traZODone 150 MG tablet    DESYREL    30 tablet    Take 1 tablet (150 mg) by mouth nightly as needed for sleep Needs appointment for further refills.

## 2017-05-16 NOTE — PATIENT INSTRUCTIONS
PLAN:  1. Continue PHYSICAL THERAPY   2. Medications:    1. Increase Cymbalta to 60mg per day  3. Procedures: consider repeat lumbar sympathetic nerve block (call if you wish to proceed)  4. I recommend that you see Dr. Azeem Messer for a 2nd opinion  5. Follow-up with me in 8 weeks.       ----------------------------------------------------------------  Nurse Triage line:  [phone]   Call this number with any questions or concerns. You may leave a detailed message anytime. Calls are typically returned Monday through Friday between 8 AM and 4:30 PM. We usually get back to you within 2 business days depending on the issue/request.       Medication refills:    For non-narcotic medications, call your pharmacy directly to request a refill. The pharmacy will contact the [facility] Management Center for authorization. Please allow 3-4 days for these refills to be processed.     For narcotic refills, call the nurse triage line or send a [employer] message. Please contact us 7-10 days before your refill is due. The message MUST include the name of the specific medication(s) requested and how you would like to receive the prescription(s). The options are as follows:    Pain Clinic staff can mail the prescription to your pharmacy. Please tell us the name of the pharmacy.    You may pick the prescription up at the Pain Clinic (tell us the location) or during a clinic visit with your pain provider    Pain Clinic staff can deliver the prescription to the [facility] pharmacy in the clinic building. Please tell us the location.      Scheduling number: [phone].  Call this number to schedule or change appointments.    We believe regular attendance is key to your success in our program.    Any time you are unable to keep your appointment we ask that you call us at least 24 hours in advance to let us know. This will allow us to offer the appointment time to another patient.
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
## 2017-05-16 NOTE — PROGRESS NOTES
"Pain Physical Therapy Progress Note    Patient Name: Mata Poe     YOB: 1960     Medical Record Number: [id number]    Visits: 6/12    Diagnosis:    Complex regional pain syndrome type 1 of left lower extremity  Myofascial pain  Chronic pain of left ankle  Multiple joint pain    Subjective: Patient reports that he is having increased pain today and yesterday; doesn't know why; wondering about weather--raining and damp out.    Less pain when not walking on it    Waking up during the night; not sleeping well    Having more issues with anxiety with the pain    Self Care  HEP: does some of the visualization, not sure if helping  Walking/Aerobic Activity: has been doing some floating and marching in hot tub  Heat/Ice: likes the hot tub  Posture: NA  Mini Breaks: NA  Breathing/Relaxation: has been working on some mediation with therapist; podcasts  Pacing: has been taking more breaks when on the feet too much    Objective Findings:   OBSERVATION: Appears anxious, but has been less the last couple of visits, guarded posture; slight forward head; hiked shoulders; arms across chest; clenching jaw and fists, poor core engagement with seated posture; upper chest breathing noted; L LE extended  More relaxed posture when holding pillow;   GAIT & LOCOMOTION: Antalgic: moving slower than usual; guarded, decreased weight bearing on L; minimal arm movement, leaning to the R    Treatment Interventions:  Therapeutic Activity:   Discussed self care for chronic pain and mini-breaks, discussed focusing on self/taking time out for ones self; discuss mini-breaks, being more proactive in pain management, not using distractions to avoid pain; taking time to \"check in\" to see how ones self is doing throughout the day and then doing something to address the issues if needed;--changing posture, changing positions, taking a break, doing stretches, doing some breathing/relaxation techniques, shoulder shrugs/rolls, etc.  " "Gave handout.  Discussed taking time to do more mediation/relaxation during the day.; discussed working on marching in hot tub.    Neuromuscular Reeducation:   Therapeutic Neuroscience Education: reviewed importance of working on decreasing sensitivity of nervous system; how anixety and stress affects pain; encouraged patient to do more mediation and relaxation during the day; working on \"turning down\" nervous system; reviewed nerves and sensitivity/alarm system--how L LE is extra sensitive and easy to \"set off\" right now.  Nerve sensors: Patient educated on the concept of neuroplasticity, and how factors such as temperature, stress, movement, immunity, and blood flow affect pain via ion channel expression.  Instruction provided regarding homeostasis/ion channel balance disruption may occur based on what your brain thinks is needed for survival.   Worked on relaxation/diaphragmatic breathing in sitting; having some weight bearing through L LE; used pillow in lap to help UEs/upper body to relax. Discussed working on relaxation/mediation in multiple positions at home; doing well in supine with feet in non-weight bearing.  __________________________________________________________________    Instruction on home program: review of nerve sensitivity; mini breaks    Assessment:  Ongoing Functional Limitations Include:  Patient tolerated/responded well to treatment  Impression: slowly progressing, needs repeated instruction on CRPS; TNE; appears limited secondary to anixety    Recommendations: continue, may benefit from health psych    Intensity Level: 1 (1=low intensity; 5=high intensity)    Demonstrates/Verbalizes Technique: 2, 3 (1= poor technique-difficulty performing exercises,significant cues required; 5= good technique-performs exercises without cues)    Body Awareness: 2 (1=low awareness; 5=high awareness)    Posture/Stability: 2 (1= poor posture, stability; 5= good posture, stability)    Motivational Level: " Cooperative; anxious    Response to Teaching: cooperative and needs reinforcement    Factors that affect learning: None    _______________________________________________________________________  Plan of Care   Continue PT to support reactivation and integration of self regulation pain management skills;  Focus of next session will be on: pacing, HEP     Present:  AYO     Total Visit Time:  45 minutes  TA: 20 mins; Neuro-Re Ed: 25 mins      Therapist: Harper Carrillo PT             Date: 5/16/2017

## 2017-05-16 NOTE — NURSING NOTE
"Chief Complaint   Patient presents with     Pain       Initial /84  Pulse 90  Wt 98.9 kg (218 lb)  BMI 30.4 kg/m2 Estimated body mass index is 30.4 kg/(m^2) as calculated from the following:    Height as of 4/19/17: 1.803 m (5' 11\").    Weight as of this encounter: 98.9 kg (218 lb).  Medication Reconciliation: felisa Cavanaugh CMA (AAMA)      "

## 2017-05-24 ENCOUNTER — MYC REFILL (OUTPATIENT)
Dept: PALLIATIVE MEDICINE | Facility: CLINIC | Age: 57
End: 2017-05-24

## 2017-05-24 ENCOUNTER — MYC MEDICAL ADVICE (OUTPATIENT)
Dept: PALLIATIVE MEDICINE | Facility: CLINIC | Age: 57
End: 2017-05-24

## 2017-05-24 ENCOUNTER — OFFICE VISIT (OUTPATIENT)
Dept: FAMILY MEDICINE | Facility: CLINIC | Age: 57
End: 2017-05-24
Payer: COMMERCIAL

## 2017-05-24 VITALS
SYSTOLIC BLOOD PRESSURE: 120 MMHG | DIASTOLIC BLOOD PRESSURE: 81 MMHG | HEIGHT: 71 IN | TEMPERATURE: 97.6 F | BODY MASS INDEX: 30.32 KG/M2 | HEART RATE: 80 BPM | WEIGHT: 216.6 LBS

## 2017-05-24 DIAGNOSIS — G89.29 CHRONIC PAIN OF LEFT ANKLE: ICD-10-CM

## 2017-05-24 DIAGNOSIS — M25.572 CHRONIC PAIN OF LEFT ANKLE: ICD-10-CM

## 2017-05-24 DIAGNOSIS — E78.5 HYPERLIPIDEMIA LDL GOAL <130: Chronic | ICD-10-CM

## 2017-05-24 DIAGNOSIS — G90.522 COMPLEX REGIONAL PAIN SYNDROME TYPE 1 OF LEFT LOWER EXTREMITY: ICD-10-CM

## 2017-05-24 DIAGNOSIS — G89.4 CHRONIC PAIN SYNDROME: Chronic | ICD-10-CM

## 2017-05-24 DIAGNOSIS — F41.8 MIXED ANXIETY DEPRESSIVE DISORDER: Primary | Chronic | ICD-10-CM

## 2017-05-24 DIAGNOSIS — M79.2 NEUROPATHIC PAIN: ICD-10-CM

## 2017-05-24 DIAGNOSIS — R73.01 ELEVATED FASTING GLUCOSE: ICD-10-CM

## 2017-05-24 DIAGNOSIS — G47.33 OSA (OBSTRUCTIVE SLEEP APNEA): Chronic | ICD-10-CM

## 2017-05-24 PROCEDURE — 99214 OFFICE O/P EST MOD 30 MIN: CPT | Performed by: FAMILY MEDICINE

## 2017-05-24 RX ORDER — DULOXETIN HYDROCHLORIDE 30 MG/1
CAPSULE, DELAYED RELEASE ORAL
Qty: 60 CAPSULE | Refills: 0 | Status: CANCELLED | OUTPATIENT
Start: 2017-05-24

## 2017-05-24 RX ORDER — ATORVASTATIN CALCIUM 40 MG/1
40 TABLET, FILM COATED ORAL DAILY
Qty: 90 TABLET | Refills: 3 | Status: SHIPPED | OUTPATIENT
Start: 2017-05-24 | End: 2018-05-05

## 2017-05-24 ASSESSMENT — PAIN SCALES - GENERAL: PAINLEVEL: EXTREME PAIN (9)

## 2017-05-24 NOTE — NURSING NOTE
"Chief Complaint   Patient presents with     RECHECK       Initial /81  Pulse 80  Temp 97.6  F (36.4  C) (Tympanic)  Ht 5' 11\" (1.803 m)  Wt 216 lb 9.6 oz (98.2 kg)  BMI 30.21 kg/m2 Estimated body mass index is 30.21 kg/(m^2) as calculated from the following:    Height as of this encounter: 5' 11\" (1.803 m).    Weight as of this encounter: 216 lb 9.6 oz (98.2 kg).  Medication Reconciliation: complete     Alexandra Allen CMA      "

## 2017-05-24 NOTE — TELEPHONE ENCOUNTER
Message from [employer]:  Original authorizing provider: DIMITRI Corona CNP would like a refill of the following medications:  DULoxetine (CYMBALTA) 30 MG EC capsule [DIMITRI Corona CNP]    Preferred pharmacy: [facility] - [place], [place] - [address]    Comment:

## 2017-05-24 NOTE — PROGRESS NOTES
SUBJECTIVE:                                                    Mata Poe is a 57 year old male who presents to clinic today for the following health issues:    Chronic Pain Follow-Up       Type / Location of Pain: left ankle, foot  Analgesia/pain control:       Recent changes:  same      Overall control: Inadequate pain control. Is followed by Pain management. Pain psychology recommended.  Narcotics not recommended. Has not increased Cymbalta to 60 mg.   Activity level/function:      Daily activities:  Unable to perform most daily activities - chores, hobbies, social activities, driving    Work:  Unable to work  Adverse effects:  No  Adherance    Taking medication as directed?  Yes    Participating in other treatments: yes  Risk Factors:    Sleep:  Poor    Mood/anxiety:  slightly worsened    Recent family or social stressors:  none noted    Other aggravating factors: prolonged standing   Didn't feel like shots in back helped before and he is very anxious.   Still using trazodone at night.     Hyperlipidemia.  Lab Results   Component Value Date    LDL  03/24/2017     Cannot estimate LDL when triglyceride exceeds 400 mg/dL    LDL 73 03/24/2017      BPH  Doing well on azulfazosin.   PHQ-9 SCORE 1/27/2017 3/7/2017 3/15/2017   Total Score 13 11 7     JACOB-7 SCORE 1/27/2017 1/27/2017 3/15/2017   Total Score 21 21 17     Encounter-Level CSA:     There are no encounter-level csa.             Amount of exercise or physical activity: None    Problems taking medications regularly: No    Medication side effects: none    Diet: regular (no restrictions)        PROBLEMS TO ADD ON...  1. Mixed anxiety depressive disorder    2. Chronic pain syndrome    3. Hyperlipidemia LDL goal <130    4. Elevated fasting glucose    5. Obstructive Sleep Apnea severe (AHI=70)         Problem list and histories reviewed & adjusted, as indicated.  Additional history: as documented    Patient Active Problem List   Diagnosis     Chronic pain  "syndrome     Allergic rhinitis     Abnormal CT scan, chest     24 hr info given     Hyperlipidemia LDL goal <130     Elevated fasting glucose     Obstructive Sleep Apnea severe (AHI=70)     Mixed anxiety depressive disorder     Benign prostatic hyperplasia with urinary obstruction     Polyp of colon     Insomnia due to medical condition     Past Surgical History:   Procedure Laterality Date     HERNIA REPAIR  1996    Right Hernia     ORTHOPEDIC SURGERY  2003    left foot       Social History   Substance Use Topics     Smoking status: Former Smoker     Smokeless tobacco: Never Used      Comment: quit in 1999     Alcohol use Yes      Comment: 8 drinks per week     Family History   Problem Relation Age of Onset     Hypertension Father      CEREBROVASCULAR DISEASE Father      CANCER Father      CANCER Sister      intestinal cancer     Cancer - colorectal Sister      Arthritis Daughter      Cancer - colorectal Sister      CANCER Mother      DIABETES No family hx of      C.A.D. No family hx of      Breast Cancer No family hx of      Prostate Cancer No family hx of            Reviewed and updated as needed this visit by clinical staff  Tobacco  Allergies  Meds  Med Hx  Surg Hx  Fam Hx  Soc Hx      Reviewed and updated as needed this visit by Provider         1. Mixed anxiety depressive disorder    2. Chronic pain syndrome    3. Hyperlipidemia LDL goal <130    4. Elevated fasting glucose    5. Obstructive Sleep Apnea severe (AHI=70)        PMH: Updated and/or reviewed in chart.    PSH: Updated and/or reviewed in chart.    Family History: Updated and/or reviewed in chart.     ROS:  ROS:General, psych, musculoskeletal, bowels and bladder otherwise normal other than above.     OBJECTIVE:                                                    /81  Pulse 80  Temp 97.6  F (36.4  C) (Tympanic)  Ht 5' 11\" (1.803 m)  Wt 216 lb 9.6 oz (98.2 kg)  BMI 30.21 kg/m2  GENERAL: Pleasant and interactive.  Alert and oriented x 3. "  No acute distress.  PSYCH: Alert. Cooperative.  Mildly agitated.  Mood is anxious and affect is consistant.  Denies suicidal or homicidal ideation.      Results for orders placed or performed in visit on 05/01/17   XR Lumbar Nerve Block Inj Sympathetic    Narrative    This exam was marked as non-reportable because it will not be read by a   radiologist or a [facility] non-radiologist provider.                ASSESSMENT/PLAN:                                                        ICD-10-CM    1. Mixed anxiety depressive disorder - cofactor with pain. cymbalta not at max effect yet. Could benefit from targeted pain psychology.  F41.8    2. Chronic pain syndrome - ongoing. Unable to perform usual duties of job so is still considered disabled as unable to stand or walk more than briefly G89.4    3. Hyperlipidemia LDL goal <130 E78.5 atorvastatin (LIPITOR) 40 MG tablet   4. Elevated fasting glucose - has not bene monitoring.   Lab Results   Component Value Date    A1C 5.7 06/11/2012     R73.01    5. Obstructive Sleep Apnea severe (AHI=70) G47.33        Care plan updated in chart for chronic problems.    Patient Instructions     I would encourage you to see the pain psychologist. Its ok to continue to see your regular counselor but let them both know which areas they are working respectively.    Ok to hold on shots for now.     Ok to fill prescription through [facility].    We will send to [facility] for colonoscopy report from November 2016.    Disability paperwork filled out through 8.25.17 with plan for re-evalution.    Follow-up 3 months.      Orders Placed This Encounter     atorvastatin (LIPITOR) 40 MG tablet      Will attempt to get colonoscopy records from [facility].     See Patient Instructions    Mariusz Ordonez MD

## 2017-05-24 NOTE — MR AVS SNAPSHOT
After Visit Summary   5/24/2017    Mata Poe    MRN: [id number]           Patient Information     Date Of Birth          1960        Visit Information        Provider Department      5/24/2017 11:30 AM Mariusz Ordonez MD [facility]        Today's Diagnoses     Mixed anxiety depressive disorder    -  1    Chronic pain syndrome        Hyperlipidemia LDL goal <130        Elevated fasting glucose        Obstructive Sleep Apnea severe (AHI=70)          Care Instructions      I would encourage you to see the pain psychologist. Its ok to continue to see your regular counselor but let them both know which areas they are working respectively.    Ok to hold on shots for now.     Ok to fill prescription through [facility].    We will send to [facility] for colonoscopy report from November 2016.    Disability paperwork filled out through 8.25.17 with plan for re-evalution.    Follow-up 3 months.           Follow-ups after your visit        Follow-up notes from your care team     Return in about 3 months (around 8/24/2017).      Your next 10 appointments already scheduled     Jul 26, 2017  9:30 AM CDT   Return Visit with DIMITRI Corona [facility] ([facility])    [address] [id number] [phone]              Who to contact     Normal or non-critical lab and imaging results will be communicated to you by MyChart, letter or phone within 4 business days after the clinic has received the results. If you do not hear from us within 7 days, please contact the clinic through MyChart or phone. If you have a critical or abnormal lab result, we will notify you by phone as soon as possible.  Submit refill requests through [facility] or call your pharmacy and they will forward the refill request to us. Please allow 3 business days for your refill to be completed.          If you need to speak with a Medical  " for additional information , please call: [phone]           Additional Information About Your Visit        MyChart Information     [facility] gives you secure access to your electronic health record. If you see a primary care provider, you can also send messages to your care team and make appointments. If you have questions, please call your primary care clinic.  If you do not have a primary care provider, please call [phone] and they will assist you.        Care EveryWhere ID     This is your Care EveryWhere ID. This could be used by other organizations to access your [facility] medical records  [id number]        Your Vitals Were     Pulse Temperature Height BMI (Body Mass Index)          80 97.6  F (36.4  C) (Tympanic) 5' 11\" (1.803 m) 30.21 kg/m2         Blood Pressure from Last 3 Encounters:   05/24/17 120/81   05/16/17 137/84   05/01/17 117/75    Weight from Last 3 Encounters:   05/24/17 216 lb 9.6 oz (98.2 kg)   05/16/17 218 lb (98.9 kg)   04/19/17 217 lb (98.4 kg)              Today, you had the following     No orders found for display         Today's Medication Changes          These changes are accurate as of: 5/24/17 12:20 PM.  If you have any questions, ask your nurse or doctor.               These medicines have changed or have updated prescriptions.        Dose/Directions    atorvastatin 40 MG tablet   Commonly known as:  LIPITOR   This may have changed:    - how much to take  - how to take this  - when to take this   Used for:  Hyperlipidemia LDL goal <130   Changed by:  Mariusz Ordonez MD        Dose:  40 mg   Take 1 tablet (40 mg) by mouth daily   Quantity:  90 tablet   Refills:  3            Where to get your medicines      These medications were sent to [facility] [address]  [address], [place] [facility] [address]     Phone:  [phone]     atorvastatin 40 MG tablet                Primary Care Provider Office Phone # Fax #    " Mariusz Ordonez [phone]       [facility] [address]  [place] [address]        Thank you!     Thank you for choosing [facility]  for your care. Our goal is always to provide you with excellent care. Hearing back from our patients is one way we can continue to improve our services. Please take a few minutes to complete the written survey that you may receive in the mail after your visit with us. Thank you!             Your Updated Medication List - Protect others around you: Learn how to safely use, store and throw away your medicines at www.disposemymeds.org.          This list is accurate as of: 5/24/17 12:20 PM.  Always use your most recent med list.                   Brand Name Dispense Instructions for use    alfuzosin 10 MG 24 hr tablet    UROXATRAL     Take 10 mg by mouth       ASPIRIN EC PO      Take 81 mg by mouth daily Reported on 5/1/2017       atorvastatin 40 MG tablet    LIPITOR    90 tablet    Take 1 tablet (40 mg) by mouth daily       DULoxetine 30 MG EC capsule    CYMBALTA    60 capsule    Take 60mg (2 capsules per day) in the morning. If it makes you sleepy, then take this at bedtime instead.       latanoprost 0.005 % ophthalmic solution    XALATAN         LORazepam 1 MG tablet    ATIVAN    30 tablet    Take 0.5-1 tablets (0.5-1 mg) by mouth every 8 hours as needed for anxiety       order for [facility]      Respironics REMSTAR 60 Series Auto CPAP 7cm H2O, Airfit P10 nasal pillow mask w/medium pillows       traZODone 150 MG tablet    DESYREL    30 tablet    Take 1 tablet (150 mg) by mouth nightly as needed for sleep Needs appointment for further refills.

## 2017-05-24 NOTE — PATIENT INSTRUCTIONS
I would encourage you to see the pain psychologist. Its ok to continue to see your regular counselor but let them both know which areas they are working respectively.    Ok to hold on shots for now.     Ok to fill prescription through [facility].    We will send to [facility] for colonoscopy report from November 2016.    Disability paperwork filled out through 8.25.17 with plan for re-evalution.    Follow-up 3 months.

## 2017-05-24 NOTE — TELEPHONE ENCOUNTER
Message from pt at 1003:    Hi, On our last visit you were going to notify the [facility] on this could you have them refill this? Kayley Frank   ---------  Cymbalta was in the subject line of pt's [employer] message. Will route to MA pool for assistance with gathering refill information.    Message sent to pt:    Vani Frank,     Are you tolerating the increased dose of 60 mg/day and do you feel that it has been helpful?  If so, we could prescribe a 60 mg capsule and then you would only take 1 pill/day.  Let me know your thoughts.     Take care,     Felisha Gallardo, SUSYN, RN-BC  Patient Care Supervisor/Care Coordinator  [facility]

## 2017-05-24 NOTE — TELEPHONE ENCOUNTER
Pt also sent a [employer] request. Will close this encounter and address refill through other encounter.    JEREMY Encarnacion, RN-BC  Patient Care Supervisor/Care Coordinator  [facility] [place]

## 2017-05-25 RX ORDER — DULOXETIN HYDROCHLORIDE 30 MG/1
CAPSULE, DELAYED RELEASE ORAL
Qty: 60 CAPSULE | Refills: 0 | Status: SHIPPED | OUTPATIENT
Start: 2017-05-25 | End: 2017-06-12

## 2017-05-25 NOTE — TELEPHONE ENCOUNTER
Called pt. States that he never got the refill that was sent on 5/16.  He was taking 2 of the 30 mg capsules for a while but has gone back to 30 mg because he didn't get the refill.  Will call  mail order pharmacy to check status since the information in EPIC states that the pharmacy received the prescription.  If they do not have it, will consider sending a refill to [facility] so pt does not run out before the mail order pharmacy would send to him.     Called Mail order pharmacy and was told that Cymbalta 60 mg caps, #30, were mailed yesterday 5/24 at 1100. Asked person why 60 mg vs 30 mg were prescribed and he did not know. Review of directions state: Take 60mg (2 capsules per day) in the morning. If it makes you sleepy, then take this at bedtime instead.  There may have been insurance issues with filling 30 mg caps based on directions.     Called pt and let him know that the medication was mailed yesterday.  Informed him that I was told they were 60 mg caps vs 30 mg and that he should take 1/day.  Pt states that he would rather have 30 mg capsules.  Asked if he had increased to 60 mg dose for a while after his appt with Jenna on 5/16. Pt stated that he did and was tolerating but then dropped back down to 30 mg when his supply was getting low. Pt states that he will take the pills to the [facility] pharmacy and ask for them to be exchanged for 30 mg caps. Let him know that I was not sure if the pharmacy would allow the return of the medication. Pt asked that this nurse call [facility] and let them know his plan.     Called [facility] pharmacy and spoke with Sanjay, pharmacist. He looked further into the details of the 60 mg Rx that was filled and sees that there was a note that the pt requested the 60 mg caps. He states that he will not be able to take the cymbalta 60 mg caps back.  Sanjay also stated that when he tried to run the 30 mg caps thru pt's insurance at 2/day, it was covered, cost $8.  Since the  pt was adamant about receiving the 30 mg caps, will check with Jenna about sending another prescription to [facility] pharmacy. Pharmacist states that he can keep the 60 mg caps and use next month if continues to tolerate 60 mg dose.     Rx prepped for review by Jenna. Pt would like a call back re: prescription status.     SUSY EncarnacionN, RN-BC  Patient Care Supervisor/Care Coordinator  [facility] [place]

## 2017-05-25 NOTE — TELEPHONE ENCOUNTER
Signed Prescriptions:                        Disp   Refills    DULoxetine (CYMBALTA) 30 MG EC capsule     60 cap*0        Sig: Take 60mg (2 caps) in the AM. If too sleepy, then           take at HS instead. Return to 30 mg/day if not           tolerating higher dose.  Authorizing Provider: JENNA MCDERMOTT    I completely agree, if he continues to tolerate taking 30mg capsules using 2 of them in the morning, then we will switch to 60mg capsules and use one per day.    Jenna MOSLEY, RN CNP, FNP  [facility] [place]

## 2017-05-26 NOTE — TELEPHONE ENCOUNTER
I spoke to Mata and relayed the below information.    SUSY RachelN, RN  Care Coordinator  [facility] [place]

## 2017-05-30 DIAGNOSIS — G57.72 CAUSALGIA OF LOWER LIMB, LEFT: ICD-10-CM

## 2017-05-30 DIAGNOSIS — F41.8 MIXED ANXIETY DEPRESSIVE DISORDER: ICD-10-CM

## 2017-05-30 RX ORDER — LORAZEPAM 1 MG/1
.5-1 TABLET ORAL EVERY 8 HOURS PRN
Qty: 30 TABLET | Refills: 0 | Status: SHIPPED | OUTPATIENT
Start: 2017-05-30 | End: 2017-08-11

## 2017-05-30 NOTE — TELEPHONE ENCOUNTER
Lorazepam  1mg      Last Written Prescription Date:  04/17/2017 #30 x 0  Last filled 04/18/2017  Last Office Visit with [facility], [facility] or [facility] prescribing provider: 05/24/2017 ANNY Ordonez  Future Office visit:    Next 5 appointments (look out 90 days)     Jul 26, 2017  9:30 AM CDT   Return Visit with DIMITRI Corona CNP   [facility] ([facility])    [address]  [place] [id number] [phone]                   Routing refill request to provider for review/approval because:  Drug not on the [facility], [facility] or [facility] refill protocol or controlled substance

## 2017-05-31 ENCOUNTER — OFFICE VISIT (OUTPATIENT)
Dept: PALLIATIVE MEDICINE | Facility: CLINIC | Age: 57
End: 2017-05-31
Payer: COMMERCIAL

## 2017-05-31 DIAGNOSIS — G90.522 COMPLEX REGIONAL PAIN SYNDROME TYPE 1 OF LEFT LOWER EXTREMITY: Primary | ICD-10-CM

## 2017-05-31 PROCEDURE — 96150 HC HEALTH & BEHAVIOR ASSESS INITIAL, EA 15MIN: CPT | Performed by: PSYCHOLOGIST

## 2017-05-31 NOTE — MR AVS SNAPSHOT
After Visit Summary   5/31/2017    Mata Poe    MRN: [id number]           Patient Information     Date Of Birth          1960        Visit Information        Provider Department      5/31/2017 11:00 AM Kemal Kenney LP [facility]        Today's Diagnoses     Complex regional pain syndrome type 1 of left lower extremity    -  1       Follow-ups after your visit        Your next 10 appointments already scheduled     Jul 26, 2017  9:30 AM CDT   Return Visit with DIMITRI Corona CNP   [facility] ([facility])    [address] [id number] [phone]            Aug 30, 2017 10:00 AM CDT   Office Visit with Mariusz Ordonez MD   [facility]    [address] [address] [phone]           Bring a current list of meds and any records pertaining to this visit.  For Physicals, please bring immunization records and any forms needing to be filled out.  Please arrive 10 minutes early to complete paperwork.              Who to contact     If you have questions or need follow up information about today's clinic visit or your schedule please contact [facility] directly at [phone].  Normal or non-critical lab and imaging results will be communicated to you by MyChart, letter or phone within 4 business days after the clinic has received the results. If you do not hear from us within 7 days, please contact the clinic through [employer] or phone. If you have a critical or abnormal lab result, we will notify you by phone as soon as possible.  Submit refill requests through [employer] or call your pharmacy and they will forward the refill request to us. Please allow 3 business days for your refill to be completed.          Additional Information About Your Visit        [employer] Information     [employer] gives you secure access to your  electronic health record. If you see a primary care provider, you can also send messages to your care team and make appointments. If you have questions, please call your primary care clinic.  If you do not have a primary care provider, please call [phone] and they will assist you.        Care EveryWhere ID     This is your Care EveryWhere ID. This could be used by other organizations to access your [facility] medical records  [id number]         Blood Pressure from Last 3 Encounters:   05/24/17 120/81   05/16/17 137/84   05/01/17 117/75    Weight from Last 3 Encounters:   05/24/17 98.2 kg (216 lb 9.6 oz)   05/16/17 98.9 kg (218 lb)   04/19/17 98.4 kg (217 lb)              We Performed the Following     HEALTH & BEHAVIOR ASSESS INITIAL, EA 15MIN        Primary Care Provider Office Phone # Fax #    Mariusz Ordonez [phone]       [facility] [address] [facility] [address]        Thank you!     Thank you for choosing [facility]  for your care. Our goal is always to provide you with excellent care. Hearing back from our patients is one way we can continue to improve our services. Please take a few minutes to complete the written survey that you may receive in the mail after your visit with us. Thank you!             Your Updated Medication List - Protect others around you: Learn how to safely use, store and throw away your medicines at www.disposemymeds.org.          This list is accurate as of: 5/31/17 11:59 PM.  Always use your most recent med list.                   Brand Name Dispense Instructions for use    alfuzosin 10 MG 24 hr tablet    UROXATRAL     Take 10 mg by mouth       ASPIRIN EC PO      Take 81 mg by mouth daily Reported on 5/1/2017       atorvastatin 40 MG tablet    LIPITOR    90 tablet    Take 1 tablet (40 mg) by mouth daily       DULoxetine 30 MG EC capsule    CYMBALTA    60 capsule    Take 60mg (2 caps) in the AM. If too sleepy, then take at  HS instead. Return to 30 mg/day if not tolerating higher dose.       latanoprost 0.005 % ophthalmic solution    XALATAN         LORazepam 1 MG tablet    ATIVAN    30 tablet    Take 0.5-1 tablets (0.5-1 mg) by mouth every 8 hours as needed for anxiety       order for DME      Respironics REMSTAR 60 Series Auto CPAP 7cm H2O, Airfit P10 nasal pillow mask w/medium pillows       traZODone 150 MG tablet    DESYREL    30 tablet    Take 1 tablet (150 mg) by mouth nightly as needed for sleep Needs appointment for further refills.

## 2017-06-07 ENCOUNTER — OFFICE VISIT (OUTPATIENT)
Dept: PALLIATIVE MEDICINE | Facility: CLINIC | Age: 57
End: 2017-06-07
Payer: COMMERCIAL

## 2017-06-07 DIAGNOSIS — G90.522 COMPLEX REGIONAL PAIN SYNDROME TYPE 1 OF LEFT LOWER EXTREMITY: Primary | ICD-10-CM

## 2017-06-07 PROCEDURE — 96152 HC HEALTH AND BEHAVIOR INTERVENTION, INDIVIDUAL, EACH 15 MINUTES: CPT | Performed by: PSYCHOLOGIST

## 2017-06-07 NOTE — MR AVS SNAPSHOT
After Visit Summary   6/7/2017    Mata Poe    MRN: [id number]           Patient Information     Date Of Birth          1960        Visit Information        Provider Department      6/7/2017 8:00 AM Kemal Kenney LP [facility]        Today's Diagnoses     Complex regional pain syndrome type 1 of left lower extremity    -  1       Follow-ups after your visit        Your next 10 appointments already scheduled     Jun 08, 2017  8:00 AM CDT   Return Visit with Harper Carrillo PT   [facility] [place] [facility] ([facility])    [address] [address]   [phone]            Jul 26, 2017  9:30 AM CDT   Return Visit with DIMITRI Corona CNP   [facility] [place] [facility] ([facility])    [address] [address]   [phone]            Aug 30, 2017 10:00 AM CDT   Office Visit with Mariusz Ordonez MD   [facility]    [address] [address] [phone]           Bring a current list of meds and any records pertaining to this visit.  For Physicals, please bring immunization records and any forms needing to be filled out.  Please arrive 10 minutes early to complete paperwork.              Who to contact     If you have questions or need follow up information about today's clinic visit or your schedule please contact [facility] directly at [phone].  Normal or non-critical lab and imaging results will be communicated to you by MyChart, letter or phone within 4 business days after the clinic has received the results. If you do not hear from us within 7 days, please contact the clinic through MyChart or phone. If you have a critical or abnormal lab result, we will notify you by phone as soon as possible.  Submit refill requests through [employer] or call your pharmacy and they will forward the refill  request to us. Please allow 3 business days for your refill to be completed.          Additional Information About Your Visit        [employer] Information     [employer] gives you secure access to your electronic health record. If you see a primary care provider, you can also send messages to your care team and make appointments. If you have questions, please call your primary care clinic.  If you do not have a primary care provider, please call [phone] and they will assist you.        Care EveryWhere ID     This is your Care EveryWhere ID. This could be used by other organizations to access your [place] medical records  [id number]         Blood Pressure from Last 3 Encounters:   05/24/17 120/81   05/16/17 137/84   05/01/17 117/75    Weight from Last 3 Encounters:   05/24/17 98.2 kg (216 lb 9.6 oz)   05/16/17 98.9 kg (218 lb)   04/19/17 98.4 kg (217 lb)              We Performed the Following     HEALTH & BEHAVIOR ASSESS, INITIAL, EA 15MIN PHD        Primary Care Provider Office Phone # Fax #    Mariusz Ordonez [phone]       [facility] [address] [facility] [address]        Thank you!     Thank you for choosing [facility]  for your care. Our goal is always to provide you with excellent care. Hearing back from our patients is one way we can continue to improve our services. Please take a few minutes to complete the written survey that you may receive in the mail after your visit with us. Thank you!             Your Updated Medication List - Protect others around you: Learn how to safely use, store and throw away your medicines at www.disposemymeds.org.          This list is accurate as of: 6/7/17 10:15 AM.  Always use your most recent med list.                   Brand Name Dispense Instructions for use    alfuzosin 10 MG 24 hr tablet    UROXATRAL     Take 10 mg by mouth       ASPIRIN EC PO      Take 81 mg by mouth daily Reported on 5/1/2017        atorvastatin 40 MG tablet    LIPITOR    90 tablet    Take 1 tablet (40 mg) by mouth daily       DULoxetine 30 MG EC capsule    CYMBALTA    60 capsule    Take 60mg (2 caps) in the AM. If too sleepy, then take at HS instead. Return to 30 mg/day if not tolerating higher dose.       latanoprost 0.005 % ophthalmic solution    XALATAN         LORazepam 1 MG tablet    ATIVAN    30 tablet    Take 0.5-1 tablets (0.5-1 mg) by mouth every 8 hours as needed for anxiety       order for [facility]      RespirVeltis REMSTAR 60 Series Auto CPAP 7cm H2O, Airfit P10 nasal pillow mask w/medium pillows       traZODone 150 MG tablet    DESYREL    30 tablet    Take 1 tablet (150 mg) by mouth nightly as needed for sleep Needs appointment for further refills.

## 2017-06-07 NOTE — PROGRESS NOTES
[facility]   [facility] [place]  Behavioral Medicine Visit    Patient Name: Mata Poe    YOB: 1960   Medical Record Number: [id number]  Date: 6/7/2017                SUBJECTIVE:  Session objective: Zac in for first visit post intake. He discusses concerns about proposed treatment planning with podiatry, medication and injections. He is not opposed per se as  Much as he is worried the interventions will make matters worse. He reports he has a long standing relationship with a mental health provider who is working with him on relaxation skills, breathing and hypnosis for pain.     We discuss some concerns about pacing and his approach to his activities. We discussed with the above mentioned resource that perhaps working in pain psychology would be overlap. I recommended he speak with his therapist and if he wants he can return and we can talk more specifically about specific skill sets.         OBJECTIVE:   Length of Visit: 45 minutes      Assessment: Current Emotional / Mental Status    Appearance:   Appropriate  Disheveled   Eye Contact:   Fair   Psychomotor Behavior: Restless   Attitude:   Cooperative   Orientation:   All  Speech   Rate / Production: Normal    Volume:  Normal   Mood:    Anxious   Affect:    Worrisome   Thought Content:  Clear   Thought Form:  Coherent  Logical   Insight:    Fair     ASSESSMENT:   Axis I: Complex regional pain disorder type I lower left extremity  Axis II: Dx deferred    Progress toward goals: fair.      Pain Status: unchanged    Emotional Status: unchanged   Medication / chemical use concerns: None    PLAN:   Next Appointment: Mata Poe will have a conversation with his therapist and determine whether or not he will reschedule I let him know as long as he is involved in the care of the pain management program I will consider working with him.  Assignment:  None  Objectives / interventions for next session: None    Kemal Kenney MA, LP, [facility]  Pain Specialist  [facility] [place]

## 2017-06-08 ENCOUNTER — OFFICE VISIT (OUTPATIENT)
Dept: PALLIATIVE MEDICINE | Facility: CLINIC | Age: 57
End: 2017-06-08
Payer: COMMERCIAL

## 2017-06-08 DIAGNOSIS — G89.29 CHRONIC PAIN OF LEFT ANKLE: ICD-10-CM

## 2017-06-08 DIAGNOSIS — M25.572 CHRONIC PAIN OF LEFT ANKLE: ICD-10-CM

## 2017-06-08 DIAGNOSIS — G90.522 COMPLEX REGIONAL PAIN SYNDROME TYPE 1 OF LEFT LOWER EXTREMITY: Primary | ICD-10-CM

## 2017-06-08 DIAGNOSIS — M79.18 MYOFASCIAL PAIN: ICD-10-CM

## 2017-06-08 DIAGNOSIS — M25.50 MULTIPLE JOINT PAIN: ICD-10-CM

## 2017-06-08 PROCEDURE — 97112 NEUROMUSCULAR REEDUCATION: CPT | Mod: GP | Performed by: PHYSICAL THERAPIST

## 2017-06-08 NOTE — Clinical Note
Hey there,   Just a little FYI, I am having a hard time getting through to Zac; today he told me that he didn't know anything about CRPS; we have been talking about it every time.  I talked with Benita and hopefully have some new ideas for him next time.  If you have any suggestions, let me know.  Thanks,  Bridget

## 2017-06-08 NOTE — MR AVS SNAPSHOT
After Visit Summary   6/8/2017    Mata Poe    MRN: [id number]           Patient Information     Date Of Birth          1960        Visit Information        Provider Department      6/8/2017 8:00 AM Harper Carrillo PT [facility]        Today's Diagnoses     Complex regional pain syndrome type 1 of left lower extremity    -  1    Myofascial pain        Chronic pain of left ankle        Multiple joint pain           Follow-ups after your visit        Your next 10 appointments already scheduled     Jul 26, 2017  9:30 AM CDT   Return Visit with DIMITRI Corona CNP   [facility] ([employer])    [address] [id number] [phone]            Aug 30, 2017 10:00 AM CDT   Office Visit with Mariusz Ordonez MD   [facility]    [address] [address] [phone]           Bring a current list of meds and any records pertaining to this visit.  For Physicals, please bring immunization records and any forms needing to be filled out.  Please arrive 10 minutes early to complete paperwork.              Who to contact     If you have questions or need follow up information about today's clinic visit or your schedule please contact [facility] directly at [phone].  Normal or non-critical lab and imaging results will be communicated to you by MyChart, letter or phone within 4 business days after the clinic has received the results. If you do not hear from us within 7 days, please contact the clinic through MyChart or phone. If you have a critical or abnormal lab result, we will notify you by phone as soon as possible.  Submit refill requests through [employer] or call your pharmacy and they will forward the refill request to us. Please allow 3 business days for your refill to be completed.          Additional Information About Your  Visit        [employer] Information     [employer] gives you secure access to your electronic health record. If you see a primary care provider, you can also send messages to your care team and make appointments. If you have questions, please call your primary care clinic.  If you do not have a primary care provider, please call [phone] and they will assist you.        Care EveryWhere ID     This is your Care EveryWhere ID. This could be used by other organizations to access your [place] medical records  [id number]         Blood Pressure from Last 3 Encounters:   05/24/17 120/81   05/16/17 137/84   05/01/17 117/75    Weight from Last 3 Encounters:   05/24/17 98.2 kg (216 lb 9.6 oz)   05/16/17 98.9 kg (218 lb)   04/19/17 98.4 kg (217 lb)              We Performed the Following     NEUROMUSCULAR RE-EDUCATION        Primary Care Provider Office Phone # Fax #    Mariusz Ordonez [phone]       [facility] [address] [facility] [address]        Thank you!     Thank you for choosing [facility]  for your care. Our goal is always to provide you with excellent care. Hearing back from our patients is one way we can continue to improve our services. Please take a few minutes to complete the written survey that you may receive in the mail after your visit with us. Thank you!             Your Updated Medication List - Protect others around you: Learn how to safely use, store and throw away your medicines at www.disposemymeds.org.          This list is accurate as of: 6/8/17 10:56 AM.  Always use your most recent med list.                   Brand Name Dispense Instructions for use    alfuzosin 10 MG 24 hr tablet    UROXATRAL     Take 10 mg by mouth       ASPIRIN EC PO      Take 81 mg by mouth daily Reported on 5/1/2017       atorvastatin 40 MG tablet    LIPITOR    90 tablet    Take 1 tablet (40 mg) by mouth daily       DULoxetine 30 MG EC capsule    CYMBALTA    60 capsule     Take 60mg (2 caps) in the AM. If too sleepy, then take at HS instead. Return to 30 mg/day if not tolerating higher dose.       latanoprost 0.005 % ophthalmic solution    XALATAN         LORazepam 1 MG tablet    ATIVAN    30 tablet    Take 0.5-1 tablets (0.5-1 mg) by mouth every 8 hours as needed for anxiety       order for [facility]      Respironics REMSTAR 60 Series Auto CPAP 7cm H2O, Airfit P10 nasal pillow mask w/medium pillows       traZODone 150 MG tablet    DESYREL    30 tablet    Take 1 tablet (150 mg) by mouth nightly as needed for sleep Needs appointment for further refills.

## 2017-06-08 NOTE — PROGRESS NOTES
Pain Physical Therapy Progress Note    Patient Name: Mata Poe     YOB: 1960     Medical Record Number: [id number]    Visits: 7/12    Diagnosis:    Complex regional pain syndrome type 1 of left lower extremity  Myofascial pain  Chronic pain of left ankle  Multiple joint pain    Subjective: Patient reports that he has been feeling better in the last few weeks, still has the burning pain inside ankle/foot; feels like the burning pain on the outside of the foot/ankle is a little bit better.    Hasn't seen Dr. Messer yet; afraid to go to more MDs--doesn't want more surgery; wants to keep working with Pain clinic and see if it can help     Wearing hiking boots today; feels like it helps to support ankles better.    Followed up with Kemal Kenney LP; decided to stay with his outside therapist--has been with him for awhile    Asking multiple questions about why his foot hurts, why the pain still there, etc    Self Care  HEP: hasn't done the visualization recently  Walking/Aerobic Activity: did do a little marching in the tub  Heat/Ice: using hot tub  Posture: NA  Mini Breaks: NA  Breathing/Relaxation: has been working on mediation with outside therapist, listening to podcasts  Pacing: has been working not being on his feet as much--about an hour and then take a break--has noticed a decrease in pain    Objective Findings:    OBSERVATION: Appears anxious, guarded posture; slight forward head; hiked shoulders; arms across chest; clenching jaw and fists, poor core engagement with seated posture; upper chest breathing noted; L LE extended  GAIT & LOCOMOTION: Antalgic: moving slowly; guarded, decreased weight bearing on L; minimal arm movement, leaning to the R; shortened step length; decreased toe off     Treatment Interventions:  Neuromuscular Reeducation:   Therapeutic Neuroscience Education: Discussed what CRPS was--as patient stated that he didn't know what it was.  Explained multiple times  and in different ways to patient; tried to have patient explain back to PT; patient with difficulty doing that.  Your Brain and Nerve Sensitivity: Patient was educated about nerve sensitivity caused by central sensitization and driven by both biological and psychosocial factors.  The patient was encouraged to identify personal stressors which contribute to pain and to discuss this with PT for guidance and plan to move ahead.  Discussed how use of education, regular aerobic exercise, relaxation, sleep can help to decrease the sensitivity of nervous system, and help to decrease pain.  Encouraged patient to work on small amounts of exercise--marching in hot tub; pool; can do marching/arm movements in sitting; trying podcats with activities  Discussed how stress, anxiety, and pain are intertwined with each other; how increase in anxiety/stress can cause an increase in pain--how increase in pain/stress/anxiety    Instructed in ambulation for increased heel to toe gait on L; relaxing upper body; increasing step length; working on decreasing limp  _________________________________________________________________    Instruction on home program: decrease amount of time limping; continue with taking breaks,     Assessment:  Ongoing Functional Limitations Include:  Patient tolerated treatment  Impression: limited progress made with concepts of CPRS education/GMI  Patient has been starting to take breaks and has noticed less pain in ankle--wearing higher shoe--was wearing flip flops when started    Recommendations: continue,     Intensity Level: 1 (1=low intensity; 5=high intensity)    Demonstrates/Verbalizes Technique: 1 (1= poor technique-difficulty performing exercises,significant cues required; 5= good technique-performs exercises without cues)    Body Awareness: 2 (1=low awareness; 5=high awareness)    Posture/Stability: 1 (1= poor posture, stability; 5= good posture, stability)    Motivational Level: Distracted,  anxious    Response to Teaching: cooperative, distracted and asked questions    Factors that affect learning: limited secondary to anixety; needs multiple explanations    _______________________________________________________________________  Plan of Care   Continue PT to support reactivation and integration of self regulation pain management skills;  Focus of next session will be on: GMI; HEP, self care     Present:  NA     Total Visit Time:  35 minutes; patient late   Neuro-Re Ed: 35 mins      Therapist: Harper Carrillo PT             Date: 6/8/2017

## 2017-06-12 DIAGNOSIS — G90.522 COMPLEX REGIONAL PAIN SYNDROME TYPE 1 OF LEFT LOWER EXTREMITY: ICD-10-CM

## 2017-06-12 DIAGNOSIS — G89.29 CHRONIC PAIN OF LEFT ANKLE: ICD-10-CM

## 2017-06-12 DIAGNOSIS — M79.2 NEUROPATHIC PAIN: ICD-10-CM

## 2017-06-12 DIAGNOSIS — M25.572 CHRONIC PAIN OF LEFT ANKLE: ICD-10-CM

## 2017-06-12 NOTE — TELEPHONE ENCOUNTER
Received fax request from [place]  pharmacy requesting refill(s) for DULoxetine (CYMBALTA) 30 MG EC capsule    Last refilled on 5/24/17    Pt last seen on 5/16/17  Next appt scheduled for 7/26/17    Radha Snow MA  [facility]      Will facilitate refill.

## 2017-06-13 RX ORDER — DULOXETIN HYDROCHLORIDE 60 MG/1
CAPSULE, DELAYED RELEASE ORAL
Qty: 30 CAPSULE | Refills: 1 | Status: SHIPPED | OUTPATIENT
Start: 2017-06-13 | End: 2017-07-26

## 2017-06-13 NOTE — TELEPHONE ENCOUNTER
Please call patient to find out if he is taking 60mg/day. If so, will change him to 60mg strength capsules instead. Thanks.  Jenna MOSLEY, RN CNP, FNP  [facility] [place]

## 2017-06-13 NOTE — TELEPHONE ENCOUNTER
He would like to continue on the 30 MG capsules though he is currently using 60 MG daily. He reports would like to continue to use the 30 MG caps at this point incase he needs to decrease the dose.    SUSY RachelN, RN  Care Coordinator  [facility] [place]

## 2017-06-13 NOTE — TELEPHONE ENCOUNTER
If he is tolerating 60mg, it is usually cheaper to change to 60mg dosage strength.  He can save the 30mg capsules he has left to use in the interim if he needs to reduce back down to 30mg and I can give him a new script to stay at 30mg if that happens.     Signed Prescriptions:                        Disp   Refills    DULoxetine (CYMBALTA) 60 MG EC capsule     30 cap*1        Sig: Take 60mg (1 capsule) per day in the morning  Authorizing Provider: MARYCRUZ MCDERMOTT, RN CNP, FNP  [facility] [place]

## 2017-06-14 NOTE — TELEPHONE ENCOUNTER
Spoke with Zac and told him that he will be getting the 60 MG capsules and should only use 1 cap daily and to reserve the 30 MG capsules incase there is a need for future dose adjustments. He understood.    SUSY RachelN, RN  Care Coordinator  [facility] [place]

## 2017-06-22 ENCOUNTER — OFFICE VISIT (OUTPATIENT)
Dept: PALLIATIVE MEDICINE | Facility: CLINIC | Age: 57
End: 2017-06-22
Payer: COMMERCIAL

## 2017-06-22 ENCOUNTER — TRANSFERRED RECORDS (OUTPATIENT)
Dept: HEALTH INFORMATION MANAGEMENT | Facility: CLINIC | Age: 57
End: 2017-06-22

## 2017-06-22 DIAGNOSIS — G89.29 CHRONIC PAIN OF LEFT ANKLE: ICD-10-CM

## 2017-06-22 DIAGNOSIS — M25.50 MULTIPLE JOINT PAIN: ICD-10-CM

## 2017-06-22 DIAGNOSIS — G90.522 COMPLEX REGIONAL PAIN SYNDROME TYPE 1 OF LEFT LOWER EXTREMITY: Primary | ICD-10-CM

## 2017-06-22 DIAGNOSIS — M79.18 MYOFASCIAL PAIN: ICD-10-CM

## 2017-06-22 DIAGNOSIS — M25.572 CHRONIC PAIN OF LEFT ANKLE: ICD-10-CM

## 2017-06-22 PROCEDURE — 97112 NEUROMUSCULAR REEDUCATION: CPT | Mod: GP | Performed by: PHYSICAL THERAPIST

## 2017-06-22 PROCEDURE — 97110 THERAPEUTIC EXERCISES: CPT | Mod: GP | Performed by: PHYSICAL THERAPIST
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
## 2017-06-22 NOTE — MR AVS SNAPSHOT
After Visit Summary   6/22/2017    Mata Poe    MRN: [id number]           Patient Information     Date Of Birth          1960        Visit Information        Provider Department      6/22/2017 11:00 AM Harper Carrillo PT [facility]        Today's Diagnoses     Complex regional pain syndrome type 1 of left lower extremity    -  1    Myofascial pain        Chronic pain of left ankle        Multiple joint pain           Follow-ups after your visit        Your next 10 appointments already scheduled     Jul 26, 2017  9:30 AM CDT   Return Visit with DIMITRI Corona CNP   [facility] ([facility])    [address] [id number] [phone]            Aug 30, 2017 10:00 AM CDT   [facility]    [address] [address] [phone]           Bring a current list of meds and any records pertaining to this visit.  For Physicals, please bring immunization records and any forms needing to be filled out.  Please arrive 10 minutes early to complete paperwork.              Who to contact     If you have questions or need follow up information about today's clinic visit or your schedule please contact [facility] directly at [phone].  Normal or non-critical lab and imaging results will be communicated to you by MyChart, letter or phone within 4 business days after the clinic has received the results. If you do not hear from us within 7 days, please contact the clinic through MyChart or phone. If you have a critical or abnormal lab result, we will notify you by phone as soon as possible.  Submit refill requests through [employer] or call your pharmacy and they will forward the refill request to us. Please allow 3 business days for your refill to be completed.          Additional Information About Your  Visit        [employer] Information     [employer] gives you secure access to your electronic health record. If you see a primary care provider, you can also send messages to your care team and make appointments. If you have questions, please call your primary care clinic.  If you do not have a primary care provider, please call [phone] and they will assist you.        Care EveryWhere ID     This is your Care EveryWhere ID. This could be used by other organizations to access your [place] medical records  [id number]         Blood Pressure from Last 3 Encounters:   05/24/17 120/81   05/16/17 137/84   05/01/17 117/75    Weight from Last 3 Encounters:   05/24/17 98.2 kg (216 lb 9.6 oz)   05/16/17 98.9 kg (218 lb)   04/19/17 98.4 kg (217 lb)              We Performed the Following     NEUROMUSCULAR RE-EDUCATION     THERAPEUTIC EXERCISES        Primary Care Provider Office Phone # Fax #    Mariusz Ordonez [phone]       [facility] [address]  [facility] [address]        Equal Access to Services     [facility] : Hadii aad ku hadasho Soomaali, waaxda luqadaha, qaybta kaalmada adenoahyasarah, olivia serrano . So [facility] [phone].    ATENCIÓN: Si habla español, tiene a chavez disposición servicios gratFairchild Industrial Products Companyos de asistencia lingüística. Carolyn al [phone].    We comply with applicable federal civil rights laws and [place] laws. We do not discriminate on the basis of race, color, national origin, age, disability sex, sexual orientation or gender identity.            Thank you!     Thank you for choosing [facility]  for your care. Our goal is always to provide you with excellent care. Hearing back from our patients is one way we can continue to improve our services. Please take a few minutes to complete the written survey that you may receive in the mail after your visit with us. Thank you!             Your Updated Medication List - Protect others  around you: Learn how to safely use, store and throw away your medicines at www.disposemymeds.org.          This list is accurate as of: 6/22/17 12:32 PM.  Always use your most recent med list.                   Brand Name Dispense Instructions for use Diagnosis    alfuzosin 10 MG 24 hr tablet    UROXATRAL     Take 10 mg by mouth        ASPIRIN EC PO      Take 81 mg by mouth daily Reported on 5/1/2017        atorvastatin 40 MG tablet    LIPITOR    90 tablet    Take 1 tablet (40 mg) by mouth daily    Hyperlipidemia LDL goal <130       DULoxetine 60 MG EC capsule    CYMBALTA    30 capsule    Take 60mg (1 capsule) per day in the morning    Chronic pain of left ankle, Complex regional pain syndrome type 1 of left lower extremity, Neuropathic pain       latanoprost 0.005 % ophthalmic solution    XALATAN          LORazepam 1 MG tablet    ATIVAN    30 tablet    Take 0.5-1 tablets (0.5-1 mg) by mouth every 8 hours as needed for anxiety    Causalgia of lower limb, left, Mixed anxiety depressive disorder       order for [facility]      Respironics REMSTAR 60 Series Auto CPAP 7cm H2O, Airfit P10 nasal pillow mask w/medium pillows        traZODone 150 MG tablet    DESYREL    30 tablet    Take 1 tablet (150 mg) by mouth nightly as needed for sleep Needs appointment for further refills.    Disturbance in sleep behavior

## 2017-06-28 ENCOUNTER — OFFICE VISIT (OUTPATIENT)
Dept: PALLIATIVE MEDICINE | Facility: CLINIC | Age: 57
End: 2017-06-28
Payer: COMMERCIAL

## 2017-06-28 DIAGNOSIS — G90.522 COMPLEX REGIONAL PAIN SYNDROME TYPE 1 OF LEFT LOWER EXTREMITY: Primary | ICD-10-CM

## 2017-06-28 DIAGNOSIS — M25.572 CHRONIC PAIN OF LEFT ANKLE: ICD-10-CM

## 2017-06-28 DIAGNOSIS — M25.50 MULTIPLE JOINT PAIN: ICD-10-CM

## 2017-06-28 DIAGNOSIS — G89.29 CHRONIC PAIN OF LEFT ANKLE: ICD-10-CM

## 2017-06-28 DIAGNOSIS — M79.18 MYOFASCIAL PAIN: ICD-10-CM

## 2017-06-28 PROCEDURE — 97112 NEUROMUSCULAR REEDUCATION: CPT | Mod: GP | Performed by: PHYSICAL THERAPIST

## 2017-06-28 PROCEDURE — 97110 THERAPEUTIC EXERCISES: CPT | Mod: GP | Performed by: PHYSICAL THERAPIST

## 2017-06-28 NOTE — MR AVS SNAPSHOT
After Visit Summary   6/28/2017    Mata Poe    MRN: [id number]           Patient Information     Date Of Birth          1960        Visit Information        Provider Department      6/28/2017 8:30 AM Harper Carrillo PT [facility]        Today's Diagnoses     Complex regional pain syndrome type 1 of left lower extremity    -  1    Myofascial pain        Chronic pain of left ankle        Multiple joint pain           Follow-ups after your visit        Your next 10 appointments already scheduled     Jul 26, 2017  9:30 AM CDT   Return Visit with DIMITRI Corona CNP   [facility] ([facility])    [address] [id number] [phone]            Aug 30, 2017 10:00 AM CDT   Office Visit with Mariusz Ordonez MD   [facility]    [address] [address] [phone]           Bring a current list of meds and any records pertaining to this visit.  For Physicals, please bring immunization records and any forms needing to be filled out.  Please arrive 10 minutes early to complete paperwork.              Who to contact     If you have questions or need follow up information about today's clinic visit or your schedule please contact [facility] directly at [phone].  Normal or non-critical lab and imaging results will be communicated to you by MyChart, letter or phone within 4 business days after the clinic has received the results. If you do not hear from us within 7 days, please contact the clinic through MyChart or phone. If you have a critical or abnormal lab result, we will notify you by phone as soon as possible.  Submit refill requests through [employer] or call your pharmacy and they will forward the refill request to us. Please allow 3 business days for your refill to be completed.          Additional Information About Your  Visit        [employer] Information     [employer] gives you secure access to your electronic health record. If you see a primary care provider, you can also send messages to your care team and make appointments. If you have questions, please call your primary care clinic.  If you do not have a primary care provider, please call [phone] and they will assist you.        Care EveryWhere ID     This is your Care EveryWhere ID. This could be used by other organizations to access your [place] medical records  [id number]         Blood Pressure from Last 3 Encounters:   05/24/17 120/81   05/16/17 137/84   05/01/17 117/75    Weight from Last 3 Encounters:   05/24/17 98.2 kg (216 lb 9.6 oz)   05/16/17 98.9 kg (218 lb)   04/19/17 98.4 kg (217 lb)              We Performed the Following     NEUROMUSCULAR RE-EDUCATION     THERAPEUTIC EXERCISES        Primary Care Provider Office Phone # Fax #    Mariusz Ordonez [phone]       [facility] [address]  [facility] [address]        Equal Access to Services     [facility] : Hadii aad ku hadasho Soomaali, waaxda luqadaha, qaybta kaalmada adenoahyasarah, olivia serrano . So [facility] [phone].    ATENCIÓN: Si habla español, tiene a chavez disposición servicios gratBill-Ray Home Mobilityos de asistencia lingüística. Carolyn al [phone].    We comply with applicable federal civil rights laws and [place] laws. We do not discriminate on the basis of race, color, national origin, age, disability sex, sexual orientation or gender identity.            Thank you!     Thank you for choosing [facility]  for your care. Our goal is always to provide you with excellent care. Hearing back from our patients is one way we can continue to improve our services. Please take a few minutes to complete the written survey that you may receive in the mail after your visit with us. Thank you!             Your Updated Medication List - Protect others  around you: Learn how to safely use, store and throw away your medicines at www.disposemymeds.org.          This list is accurate as of: 6/28/17 10:42 AM.  Always use your most recent med list.                   Brand Name Dispense Instructions for use Diagnosis    alfuzosin 10 MG 24 hr tablet    UROXATRAL     Take 10 mg by mouth        ASPIRIN EC PO      Take 81 mg by mouth daily Reported on 5/1/2017        atorvastatin 40 MG tablet    LIPITOR    90 tablet    Take 1 tablet (40 mg) by mouth daily    Hyperlipidemia LDL goal <130       DULoxetine 60 MG EC capsule    CYMBALTA    30 capsule    Take 60mg (1 capsule) per day in the morning    Chronic pain of left ankle, Complex regional pain syndrome type 1 of left lower extremity, Neuropathic pain       latanoprost 0.005 % ophthalmic solution    XALATAN          LORazepam 1 MG tablet    ATIVAN    30 tablet    Take 0.5-1 tablets (0.5-1 mg) by mouth every 8 hours as needed for anxiety    Causalgia of lower limb, left, Mixed anxiety depressive disorder       order for [facility]      Respironics REMSTAR 60 Series Auto CPAP 7cm H2O, Airfit P10 nasal pillow mask w/medium pillows        traZODone 150 MG tablet    DESYREL    30 tablet    Take 1 tablet (150 mg) by mouth nightly as needed for sleep Needs appointment for further refills.    Disturbance in sleep behavior

## 2017-06-28 NOTE — PROGRESS NOTES
"Pain Physical Therapy Progress Note    Patient Name: Mata Poe     YOB: 1960     Medical Record Number: [id number]    Visits: 9/12    Diagnosis:    Complex regional pain syndrome type 1 of left lower extremity  Myofascial pain  Chronic pain of left ankle  Multiple joint pain    Subjective: Patient reports having increased pain today--wondering about the weather;     Has been seeing therapist once a week; Félix--has been working on some imagery and visualization, pain management with him and has been helpful.      May be going on road trip with wife--looking forward to trip, chance for stress relief/\"getaway\"    Questions re: meds--recommend follow up with nursing line    Wondering about acupuncture, will ask provider for order; treatment may depend on insurance coverage    Self Care  HEP: working on the visualization exercises at home--helped with the [place] images; wanted to do it more  Walking/Aerobic Activity: marching in hot tub, going OK  Heat/Ice: using hot tub  Posture: has been more aware of his walking, trying not to limp as much; trying to relax shoulders  Mini Breaks: NA  Breathing/Relaxation: doing more relaxation techniques at home, mediation, using podcasts  Pacing: has been taking more breaks, being off foot more    Objective Findings:   OBSERVATION: Appears less anxious today compared to last visit; needs cues for slow breaths/exhale/relaxing shoulders, guarded posture; slight forward head; hiked shoulders; arms across chest; clenching jaw and fists, L LE extended  GAIT & LOCOMOTION: Antalgic: moving slowly; less guarded, decreased weight bearing on L; slight increase in arm movement and step length; decreased toe off    Treatment Interventions:  Therapeutic Procedures/Exercises: Discussed importance of being active; important to work on pacing, and not overdoing activity on L LE, but don't want to \"not\" do things either--working on finding that \"happy balance\" between " "using L LE and resting; don't want keep limiting and limiting until no activity; encourage to keep walking as able; using marching in hot tub; using on/off cycles.  Encouraged other aerobic activity--has been doing some arm exercises at home--helpful with getting increased blood flow, O2 to body/nerves--similar to doing UBE in gym  UBE x 8 mins  Forward/backward    Neuromuscular Reeducation:   GMI: encouraged patient to continue with visualization exercises at home and with therapist; reminded patient how working on \"brain exercises\" vs L foot at this time to help decrease sensitivity.    Instructed in mirror therapy in sitting with rolling gym mirror; working on foot movements with R; weight bearing with R; instructed patient to do small amounts daily.  Instructed patient in the reasoning behind mirror therapy.    __________________________________________________________________    Instruction on home program: mirror therapy at home    Assessment:  Ongoing Functional Limitations Include:  Patient tolerated/responded well to treatment  Impression: needs repeated directions and explanations for treatment;     Recommendations: continue    Intensity Level: 1 (1=low intensity; 5=high intensity)    Demonstrates/Verbalizes Technique: 2 (1= poor technique-difficulty performing exercises,significant cues required; 5= good technique-performs exercises without cues)    Body Awareness: 2 (1=low awareness; 5=high awareness)    Posture/Stability: 1 (1= poor posture, stability; 5= good posture, stability)    Motivational Level: Distracted; anxious; cooperative    Response to Teaching: needs reinforcement    Factors that affect learning: anxious     _______________________________________________________________________  Plan of Care   Continue PT to support reactivation and integration of self regulation pain management skills;  Focus of next session will be on: GMI, HEP     Present:  NA     Total Visit Time:  35 " minutes; patient late   Neuro-Re Ed: 20 mins; TE: 15 mins      Therapist: Harper Carrillo PT             Date: 6/28/2017

## 2017-07-12 ENCOUNTER — OFFICE VISIT (OUTPATIENT)
Dept: PALLIATIVE MEDICINE | Facility: CLINIC | Age: 57
End: 2017-07-12
Payer: COMMERCIAL

## 2017-07-12 DIAGNOSIS — G89.29 CHRONIC PAIN OF LEFT ANKLE: ICD-10-CM

## 2017-07-12 DIAGNOSIS — G90.522 COMPLEX REGIONAL PAIN SYNDROME TYPE 1 OF LEFT LOWER EXTREMITY: Primary | ICD-10-CM

## 2017-07-12 DIAGNOSIS — M25.572 CHRONIC PAIN OF LEFT ANKLE: ICD-10-CM

## 2017-07-12 DIAGNOSIS — M25.50 MULTIPLE JOINT PAIN: ICD-10-CM

## 2017-07-12 DIAGNOSIS — M79.18 MYOFASCIAL PAIN: ICD-10-CM

## 2017-07-12 PROCEDURE — 97112 NEUROMUSCULAR REEDUCATION: CPT | Mod: GP | Performed by: PHYSICAL THERAPIST

## 2017-07-12 PROCEDURE — 97110 THERAPEUTIC EXERCISES: CPT | Mod: GP | Performed by: PHYSICAL THERAPIST

## 2017-07-12 NOTE — MR AVS SNAPSHOT
After Visit Summary   7/12/2017    Mata Poe    MRN: [id number]           Patient Information     Date Of Birth          1960        Visit Information        Provider Department      7/12/2017 7:45 AM Harper Carrillo PT [facility]        Today's Diagnoses     Complex regional pain syndrome type 1 of left lower extremity    -  1    Myofascial pain        Chronic pain of left ankle        Multiple joint pain           Follow-ups after your visit        Your next 10 appointments already scheduled     Jul 26, 2017  9:30 AM CDT   Return Visit with DIMITRI Corona CNP   [facility] ([facility])    [address] [id number] [phone]            Aug 30, 2017 10:00 AM CDT   [facility]    [address] [address] [phone]           Bring a current list of meds and any records pertaining to this visit.  For Physicals, please bring immunization records and any forms needing to be filled out.  Please arrive 10 minutes early to complete paperwork.              Who to contact     If you have questions or need follow up information about today's clinic visit or your schedule please contact [facility] directly at [phone].  Normal or non-critical lab and imaging results will be communicated to you by MyChart, letter or phone within 4 business days after the clinic has received the results. If you do not hear from us within 7 days, please contact the clinic through MyChart or phone. If you have a critical or abnormal lab result, we will notify you by phone as soon as possible.  Submit refill requests through Invieo or call your pharmacy and they will forward the refill request to us. Please allow 3 business days for your refill to be completed.          Additional Information About Your  Visit        KangaDohart Information     Total-trax gives you secure access to your electronic health record. If you see a primary care provider, you can also send messages to your care team and make appointments. If you have questions, please call your primary care clinic.  If you do not have a primary care provider, please call [phone] and they will assist you.        Care EveryWhere ID     This is your Care EveryWhere ID. This could be used by other organizations to access your [facility] medical records  [id number]         Blood Pressure from Last 3 Encounters:   05/24/17 120/81   05/16/17 137/84   05/01/17 117/75    Weight from Last 3 Encounters:   05/24/17 98.2 kg (216 lb 9.6 oz)   05/16/17 98.9 kg (218 lb)   04/19/17 98.4 kg (217 lb)              We Performed the Following     NEUROMUSCULAR RE-EDUCATION     THERAPEUTIC EXERCISES        Primary Care Provider Office Phone # Fax #    Mariusz Ordonez [phone]       [facility] [address]  [facility] [address]        Equal Access to Services     [facility] : Hadii aad ku hadasho Soomaali, waaxda luqadaha, qaybta kaalmada adenoahyasarah, olivia serrano . So [place] [phone].    ATENCIÓN: Si habla español, tiene a chavez disposición servicios gratFreeGameCreditsos de asistencia lingüística. Carolyn al [phone].    We comply with applicable federal civil rights laws and [place] laws. We do not discriminate on the basis of race, color, national origin, age, disability sex, sexual orientation or gender identity.            Thank you!     Thank you for choosing [facility]  for your care. Our goal is always to provide you with excellent care. Hearing back from our patients is one way we can continue to improve our services. Please take a few minutes to complete the written survey that you may receive in the mail after your visit with us. Thank you!             Your Updated Medication List - Protect others  around you: Learn how to safely use, store and throw away your medicines at www.disposemymeds.org.          This list is accurate as of: 7/12/17  9:00 AM.  Always use your most recent med list.                   Brand Name Dispense Instructions for use Diagnosis    alfuzosin 10 MG 24 hr tablet    UROXATRAL     Take 10 mg by mouth        ASPIRIN EC PO      Take 81 mg by mouth daily Reported on 5/1/2017        atorvastatin 40 MG tablet    LIPITOR    90 tablet    Take 1 tablet (40 mg) by mouth daily    Hyperlipidemia LDL goal <130       DULoxetine 60 MG EC capsule    CYMBALTA    30 capsule    Take 60mg (1 capsule) per day in the morning    Chronic pain of left ankle, Complex regional pain syndrome type 1 of left lower extremity, Neuropathic pain       latanoprost 0.005 % ophthalmic solution    XALATAN          LORazepam 1 MG tablet    ATIVAN    30 tablet    Take 0.5-1 tablets (0.5-1 mg) by mouth every 8 hours as needed for anxiety    Causalgia of lower limb, left, Mixed anxiety depressive disorder       [place] [facility]      Respironics REMSTAR 60 Series Auto CPAP 7cm H2O, Airfit P10 nasal pillow mask w/medium pillows        traZODone 150 MG tablet    DESYREL    30 tablet    Take 1 tablet (150 mg) by mouth nightly as needed for sleep Needs appointment for further refills.    Disturbance in sleep behavior
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
## 2017-07-18 ENCOUNTER — TELEPHONE (OUTPATIENT)
Dept: PALLIATIVE MEDICINE | Facility: CLINIC | Age: 57
End: 2017-07-18

## 2017-07-18 DIAGNOSIS — G90.522 COMPLEX REGIONAL PAIN SYNDROME TYPE 1 OF LEFT LOWER EXTREMITY: Primary | ICD-10-CM

## 2017-07-18 NOTE — TELEPHONE ENCOUNTER
Patient states interest in acupuncture to Bridget Carrillo, physical therapist.   Orders placed for acupuncture.    Jenna MOSLEY RN CNP, FNP  [facility] [place]

## 2017-07-18 NOTE — TELEPHONE ENCOUNTER
Below information was relayed to the patient.    SUSY RachelN, RN  Care Coordinator  [facility] [place]

## 2017-07-26 ENCOUNTER — OFFICE VISIT (OUTPATIENT)
Dept: PALLIATIVE MEDICINE | Facility: CLINIC | Age: 57
End: 2017-07-26
Payer: COMMERCIAL

## 2017-07-26 VITALS
WEIGHT: 216 LBS | BODY MASS INDEX: 30.13 KG/M2 | SYSTOLIC BLOOD PRESSURE: 120 MMHG | HEART RATE: 73 BPM | DIASTOLIC BLOOD PRESSURE: 79 MMHG

## 2017-07-26 DIAGNOSIS — M79.2 NEUROPATHIC PAIN: ICD-10-CM

## 2017-07-26 DIAGNOSIS — G89.29 CHRONIC PAIN OF LEFT ANKLE: ICD-10-CM

## 2017-07-26 DIAGNOSIS — G90.522 COMPLEX REGIONAL PAIN SYNDROME TYPE 1 OF LEFT LOWER EXTREMITY: ICD-10-CM

## 2017-07-26 DIAGNOSIS — M25.572 CHRONIC PAIN OF LEFT ANKLE: ICD-10-CM

## 2017-07-26 PROCEDURE — 99214 OFFICE O/P EST MOD 30 MIN: CPT | Performed by: NURSE PRACTITIONER

## 2017-07-26 RX ORDER — DULOXETIN HYDROCHLORIDE 20 MG/1
CAPSULE, DELAYED RELEASE ORAL
Qty: 10 CAPSULE | Refills: 0 | Status: SHIPPED | OUTPATIENT
Start: 2017-07-26 | End: 2017-10-11

## 2017-07-26 ASSESSMENT — PAIN SCALES - GENERAL: PAINLEVEL: EXTREME PAIN (9)
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
## 2017-07-26 NOTE — NURSING NOTE
"Chief Complaint   Patient presents with     Pain       Initial /79  Pulse 73  Wt 98 kg (216 lb)  BMI 30.13 kg/m2 Estimated body mass index is 30.13 kg/(m^2) as calculated from the following:    Height as of 5/24/17: 1.803 m (5' 11\").    Weight as of this encounter: 98 kg (216 lb).  Medication Reconciliation: complete     Zoe Feng MA      "

## 2017-08-11 DIAGNOSIS — G57.72 CAUSALGIA OF LOWER LIMB, LEFT: ICD-10-CM

## 2017-08-11 DIAGNOSIS — F41.8 MIXED ANXIETY DEPRESSIVE DISORDER: ICD-10-CM

## 2017-08-11 NOTE — TELEPHONE ENCOUNTER
lorazepam   Last Written Prescription Date:  5/30/17  Last Fill Quantity: 30,   # refills: 0  Last Office Visit with [facility] or  Health prescribing provider: 7/26/17  Future Office visit:       Routing refill request to provider for review/approval because:  Drug not on the [facility], [facility] or  [employer] refill protocol or controlled substance

## 2017-08-14 RX ORDER — LORAZEPAM 1 MG/1
.5-1 TABLET ORAL EVERY 8 HOURS PRN
Qty: 30 TABLET | Refills: 0 | Status: SHIPPED | OUTPATIENT
Start: 2017-08-14 | End: 2017-10-11

## 2017-09-21 DIAGNOSIS — G47.33 OSA (OBSTRUCTIVE SLEEP APNEA): Primary | ICD-10-CM

## 2017-10-11 ENCOUNTER — OFFICE VISIT (OUTPATIENT)
Dept: FAMILY MEDICINE | Facility: CLINIC | Age: 57
End: 2017-10-11
Payer: COMMERCIAL

## 2017-10-11 VITALS
DIASTOLIC BLOOD PRESSURE: 66 MMHG | TEMPERATURE: 97.3 F | SYSTOLIC BLOOD PRESSURE: 120 MMHG | HEART RATE: 76 BPM | WEIGHT: 217.4 LBS | BODY MASS INDEX: 30.44 KG/M2 | HEIGHT: 71 IN

## 2017-10-11 DIAGNOSIS — G57.72 CAUSALGIA OF LOWER LIMB, LEFT: ICD-10-CM

## 2017-10-11 DIAGNOSIS — N40.1 BENIGN PROSTATIC HYPERPLASIA WITH URINARY OBSTRUCTION: Chronic | ICD-10-CM

## 2017-10-11 DIAGNOSIS — R73.01 ELEVATED FASTING GLUCOSE: ICD-10-CM

## 2017-10-11 DIAGNOSIS — G89.4 CHRONIC PAIN SYNDROME: Primary | Chronic | ICD-10-CM

## 2017-10-11 DIAGNOSIS — F41.8 MIXED ANXIETY DEPRESSIVE DISORDER: Chronic | ICD-10-CM

## 2017-10-11 DIAGNOSIS — N13.8 BENIGN PROSTATIC HYPERPLASIA WITH URINARY OBSTRUCTION: Chronic | ICD-10-CM

## 2017-10-11 PROCEDURE — 99214 OFFICE O/P EST MOD 30 MIN: CPT | Performed by: FAMILY MEDICINE

## 2017-10-11 RX ORDER — ALFUZOSIN HYDROCHLORIDE 10 MG/1
10 TABLET, EXTENDED RELEASE ORAL DAILY
Qty: 90 TABLET | Refills: 3 | Status: SHIPPED | OUTPATIENT
Start: 2017-10-11 | End: 2019-06-13

## 2017-10-11 RX ORDER — ALFUZOSIN HYDROCHLORIDE 10 MG/1
10 TABLET, EXTENDED RELEASE ORAL DAILY
Qty: 90 TABLET | Refills: 3 | Status: SHIPPED | OUTPATIENT
Start: 2017-10-11 | End: 2017-10-11

## 2017-10-11 RX ORDER — LORAZEPAM 1 MG/1
0.5 TABLET ORAL EVERY 8 HOURS PRN
Qty: 15 TABLET | Refills: 0 | Status: SHIPPED | OUTPATIENT
Start: 2017-10-11 | End: 2017-10-11

## 2017-10-11 RX ORDER — LORAZEPAM 1 MG/1
0.5 TABLET ORAL EVERY 8 HOURS PRN
Qty: 15 TABLET | Refills: 0 | Status: SHIPPED | OUTPATIENT
Start: 2017-10-11 | End: 2017-11-02

## 2017-10-11 RX ORDER — PAROXETINE 10 MG/1
10 TABLET, FILM COATED ORAL AT BEDTIME
Qty: 30 TABLET | Refills: 1 | Status: SHIPPED | OUTPATIENT
Start: 2017-10-11 | End: 2017-11-02

## 2017-10-11 RX ORDER — LORAZEPAM 1 MG/1
0.5 TABLET ORAL EVERY 8 HOURS PRN
Qty: 15 TABLET | Refills: 0 | Status: SHIPPED | OUTPATIENT
Start: 2017-12-10 | End: 2017-10-11

## 2017-10-11 RX ORDER — LORAZEPAM 1 MG/1
0.5 TABLET ORAL EVERY 8 HOURS PRN
Qty: 15 TABLET | Refills: 0 | Status: SHIPPED | OUTPATIENT
Start: 2017-11-10 | End: 2017-10-11

## 2017-10-11 ASSESSMENT — ANXIETY QUESTIONNAIRES
1. FEELING NERVOUS, ANXIOUS, OR ON EDGE: NEARLY EVERY DAY
5. BEING SO RESTLESS THAT IT IS HARD TO SIT STILL: NOT AT ALL
7. FEELING AFRAID AS IF SOMETHING AWFUL MIGHT HAPPEN: SEVERAL DAYS
2. NOT BEING ABLE TO STOP OR CONTROL WORRYING: SEVERAL DAYS
3. WORRYING TOO MUCH ABOUT DIFFERENT THINGS: SEVERAL DAYS
6. BECOMING EASILY ANNOYED OR IRRITABLE: NOT AT ALL
GAD7 TOTAL SCORE: 7

## 2017-10-11 ASSESSMENT — PATIENT HEALTH QUESTIONNAIRE - PHQ9
SUM OF ALL RESPONSES TO PHQ QUESTIONS 1-9: 5
5. POOR APPETITE OR OVEREATING: SEVERAL DAYS

## 2017-10-11 NOTE — MR AVS SNAPSHOT
After Visit Summary   10/11/2017    Mata Poe    MRN: [id number]           Patient Information     Date Of Birth          1960        Visit Information        Provider Department      10/11/2017 2:00 PM Mariusz Ordonez MD [facility]        Today's Diagnoses     Chronic pain syndrome    -  1    Mixed anxiety depressive disorder        Elevated fasting glucose        Causalgia of lower limb, left        Benign prostatic hyperplasia with urinary obstruction          Care Instructions    Start on the medications and then follow-up with me via [employer] in 3-4 weeks. I will send you and email reminder.             Follow-ups after your visit        Follow-up notes from your care team     Return in about 3 weeks (around 11/1/2017) for Routine Visit.      Your next 10 appointments already scheduled     Oct 11, 2017  2:00 PM CDT   [facility]    [address] [place] [id number]   [phone]            Nov 03, 2017  1:30 PM CDT   Return Visit with DIMITRI Corona [facility] ([facility])    [address] [place] [id number] [phone]              Who to contact     Normal or non-critical lab and imaging results will be communicated to you by MyChart, letter or phone within 4 business days after the clinic has received the results. If you do not hear from us within 7 days, please contact the clinic through MyChart or phone. If you have a critical or abnormal lab result, we will notify you by phone as soon as possible.  Submit refill requests through [employer] or call your pharmacy and they will forward the refill request to us. Please allow 3 business days for your refill to be completed.          If you need to speak with a  for additional information , please call: [phone]           Additional  "Information About Your Visit        [employer] Information     [employer] gives you secure access to your electronic health record. If you see a primary care provider, you can also send messages to your care team and make appointments. If you have questions, please call your primary care clinic.  If you do not have a primary care provider, please call [phone] and they will assist you.        Care EveryWhere ID     This is your Care EveryWhere ID. This could be used by other organizations to access your [facility] medical records  [id number]        Your Vitals Were     Pulse Temperature Height BMI (Body Mass Index)          76 97.3  F (36.3  C) (Tympanic) 5' 11\" (1.803 m) 30.32 kg/m2         Blood Pressure from Last 3 Encounters:   10/11/17 120/66   07/26/17 120/79   05/24/17 120/81    Weight from Last 3 Encounters:   10/11/17 217 lb 6.4 oz (98.6 kg)   07/26/17 216 lb (98 kg)   05/24/17 216 lb 9.6 oz (98.2 kg)              Today, you had the following     No orders found for display         Today's Medication Changes          These changes are accurate as of: 10/11/17 12:08 PM.  If you have any questions, ask your nurse or doctor.               Start taking these medicines.        Dose/Directions    alfuzosin 10 MG 24 hr tablet   Commonly known as:  UROXATRAL   Used for:  Benign prostatic hyperplasia with urinary obstruction   Started by:  Mariusz Ordonez MD        Dose:  10 mg   Take 1 tablet (10 mg) by mouth daily   Quantity:  90 tablet   Refills:  3       LORazepam 1 MG tablet   Commonly known as:  ATIVAN   Used for:  Causalgia of lower limb, left, Mixed anxiety depressive disorder   Started by:  Mariusz Ordonez MD        Dose:  0.5 mg   Take 0.5 tablets (0.5 mg) by mouth every 8 hours as needed for anxiety   Quantity:  15 tablet   Refills:  0       PARoxetine 10 MG tablet   Commonly known as:  PAXIL   Used for:  Chronic pain syndrome, Mixed anxiety depressive disorder   Started by:  Mariusz Ordonez MD     "    Dose:  10 mg   Take 1 tablet (10 mg) by mouth At Bedtime   Quantity:  30 tablet   Refills:  1         Stop taking these medicines if you haven't already. Please contact your care team if you have questions.     DULoxetine 20 MG EC capsule   Commonly known as:  CYMBALTA   Stopped by:  Maruisz Ordonez MD                Where to get your medicines      These medications were sent to [facility] - [place] [place] [facility] [address] [facility]  [address] [facility] [id number]    Hours:  Mon-Fri 8:30am-5:00pm Toll Free [phone] Phone:  [phone]     alfuzosin 10 MG 24 hr tablet         Some of these will need a paper prescription and others can be bought over the counter.  Ask your nurse if you have questions.     Bring a paper prescription for each of these medications     LORazepam 1 MG tablet    PARoxetine 10 MG tablet                Primary Care Provider Office Phone # Fax #    Mariusz Ordonez [phone] [address] [facility] [id number]        Equal Access to Services     [facility]: Hadii aad ku hadasho Soomaali, waaxda luqadaha, qaybta kaalmada adeegyada, waxsusanne serrano . So [facility] [phone].    ATENCIÓN: Si habla español, tiene a chavez disposición servicios gratuitos de asistencia lingüística. Carolyn al [phone].    We comply with applicable federal civil rights laws and [place] laws. We do not discriminate on the basis of race, color, national origin, age, disability, sex, sexual orientation, or gender identity.            Thank you!     Thank you for choosing [facility]  for your care. Our goal is always to provide you with excellent care. Hearing back from our patients is one way we can continue to improve our services. Please take a few minutes to complete the written survey that you may receive in the mail after your visit with us. Thank you!             Your Updated Medication List  - Protect others around you: Learn how to safely use, store and throw away your medicines at www.disposemymeds.org.          This list is accurate as of: 10/11/17 12:08 PM.  Always use your most recent med list.                   Brand Name Dispense Instructions for use Diagnosis    alfuzosin 10 MG 24 hr tablet    UROXATRAL    90 tablet    Take 1 tablet (10 mg) by mouth daily    Benign prostatic hyperplasia with urinary obstruction       ASPIRIN EC PO      Take 81 mg by mouth daily Reported on 5/1/2017        atorvastatin 40 MG tablet    LIPITOR    90 tablet    Take 1 tablet (40 mg) by mouth daily    Hyperlipidemia LDL goal <130       latanoprost 0.005 % ophthalmic solution    XALATAN          LORazepam 1 MG tablet    ATIVAN    15 tablet    Take 0.5 tablets (0.5 mg) by mouth every 8 hours as needed for anxiety    Causalgia of lower limb, left, Mixed anxiety depressive disorder       medical cannabis inhalation (Patient's own supply.  Not a prescription)     0 Information only    (This is NOT a prescription, and does not certify that the patient has a qualifying medical condition for medical cannabis.  The purpose of this order is  to document that the patient reports taking medical cannabis.)    Chronic pain syndrome       order for [facility]      Respironics REMSTAR 60 Series Auto CPAP 7cm H2O, Airfit P10 nasal pillow mask w/medium pillows        PARoxetine 10 MG tablet    PAXIL    30 tablet    Take 1 tablet (10 mg) by mouth At Bedtime    Chronic pain syndrome, Mixed anxiety depressive disorder       traZODone 150 MG tablet    DESYREL    30 tablet    Take 1 tablet (150 mg) by mouth nightly as needed for sleep Needs appointment for further refills.    Disturbance in sleep behavior

## 2017-10-11 NOTE — NURSING NOTE
"Chief Complaint   Patient presents with     CRPS Follow Up       Initial /66  Pulse 76  Temp 97.3  F (36.3  C) (Tympanic)  Ht 5' 11\" (1.803 m)  Wt 217 lb 6.4 oz (98.6 kg)  BMI 30.32 kg/m2 Estimated body mass index is 30.32 kg/(m^2) as calculated from the following:    Height as of this encounter: 5' 11\" (1.803 m).    Weight as of this encounter: 217 lb 6.4 oz (98.6 kg).  Medication Reconciliation: complete     Rain Wooten MA    "

## 2017-10-11 NOTE — PROGRESS NOTES
SUBJECTIVE:   Mata Poe is a 57 year old male who presents to clinic today for the following health issues:    Chronic Pain Follow-Up       Type / Location of Pain: Left foot and ankle  Analgesia/pain control:       Recent changes:  same      Overall control: Inadequate pain control depending upon the amount of activity.   Activity level/function:      Daily activities:  Able to do light housework, cooking    Work:  Able to work part time with limitations  Adverse effects:  No  Adherance    Taking medication as directed?  Yes    Participating in other treatments: yes- resting   Risk Factors:    Sleep:  Fair    Mood/anxiety:  slightly worsened    Recent family or social stressors:  none noted    Other aggravating factors: prolonged standing  PHQ-9 SCORE 1/27/2017 3/7/2017 3/15/2017   Total Score 13 11 7     JACOB-7 SCORE 1/27/2017 1/27/2017 3/15/2017   Total Score 21 21 17     Encounter-Level CSA:     There are no encounter-level csa.        Has been seeing pain clinic and continues to work on activity and comprehensive pain management. Meditaing.     Amount of exercise or physical activity: None    Problems taking medications regularly: No    Medication side effects: none  Diet: regular (no restrictions)      Anxiety    Situational during the day. Will have attacks at time. Is taking 1/2 ativan on and off which helps.     Seeing counselor and practicing some meditation which helps.     Suggested possibly a different medication such as paroxetine.     Using about 15 a month.     Using Paxil in past helped and may be a good option.    Medical Marijuana    Is using cannabinoids for pain but doesn't like the thc.      Some improvement in pain and no side effect(s).       BPH    Working well without issues. Wanting me to refill his urotraxol.   Lab Results   Component Value Date    PSA 2.54 03/24/2017     No results found for: PSAFREE  No results found for: PSAGN    No results found for: PSAPCT     Problem  "list and histories reviewed & adjusted, as indicated.  Additional history: as documented        Reviewed and updated as needed this visit by clinical staffTobacco  Allergies  Meds  Med Hx  Surg Hx  Fam Hx  Soc Hx      Reviewed and updated as needed this visit by Provider  Allergies         1. Chronic pain syndrome    2. Mixed anxiety depressive disorder    3. Elevated fasting glucose    4. Causalgia of lower limb, left    5. Benign prostatic hyperplasia with urinary obstruction        PMH: Updated and/or reviewed in chart.    PSH: Updated and/or reviewed in chart.    Family History: Updated and/or reviewed in chart.     ROS:  ROS:General, psych, musculoskeletal, bowels and bladder otherwise normal other than above.      OBJECTIVE:                                                    /66  Pulse 76  Temp 97.3  F (36.3  C) (Tympanic)  Ht 5' 11\" (1.803 m)  Wt 217 lb 6.4 oz (98.6 kg)  BMI 30.32 kg/m2  GENERAL: Pleasant and interactive.  Alert and oriented x 3.  No acute distress.  PSYCH: Alert and oriented times 3; coherent speech, normal rate and volume, able to articulate logical thoughts, able to abstract reason, no tangential thoughts, no hallucinations or delusions  His affect is normal.      Results for orders placed or performed in visit on 05/01/17   XR Lumbar Nerve Block Inj Sympathetic    Narrative    This exam was marked as non-reportable because it will not be read by a   radiologist or a [facility] non-radiologist provider.                ASSESSMENT/PLAN:                                                        ICD-10-CM    1. Chronic pain syndrome G89.4 PARoxetine (PAXIL) 10 MG tablet     medical cannabis inhalation (Patient's own supply.  Not a prescription)   2. Mixed anxiety depressive disorder F41.8 PARoxetine (PAXIL) 10 MG tablet     LORazepam (ATIVAN) 1 MG tablet     DISCONTINUED: LORazepam (ATIVAN) 1 MG tablet     DISCONTINUED: LORazepam (ATIVAN) 1 MG tablet     DISCONTINUED: LORazepam " (ATIVAN) 1 MG tablet   3. Elevated fasting glucose R73.01    4. Causalgia of lower limb, left G57.72 LORazepam (ATIVAN) 1 MG tablet     DISCONTINUED: LORazepam (ATIVAN) 1 MG tablet     DISCONTINUED: LORazepam (ATIVAN) 1 MG tablet     DISCONTINUED: LORazepam (ATIVAN) 1 MG tablet   5. Benign prostatic hyperplasia with urinary obstruction N40.1 alfuzosin (UROXATRAL) 10 MG 24 hr tablet    N13.8 DISCONTINUED: alfuzosin (UROXATRAL) 10 MG 24 hr tablet       Care plan updated in chart for chronic problems.    Patient Instructions   Start on the medications and then follow-up with me via [employer] in 3-4 weeks. I will send you and email reminder.        Orders Placed This Encounter     DISCONTD: alfuzosin (UROXATRAL) 10 MG 24 hr tablet     PARoxetine (PAXIL) 10 MG tablet     medical cannabis inhalation (Patient's own supply.  Not a prescription)     DISCONTD: LORazepam (ATIVAN) 1 MG tablet     DISCONTD: LORazepam (ATIVAN) 1 MG tablet     DISCONTD: LORazepam (ATIVAN) 1 MG tablet     alfuzosin (UROXATRAL) 10 MG 24 hr tablet     LORazepam (ATIVAN) 1 MG tablet            See Patient Instructions    Mariusz Ordonez MD

## 2017-10-11 NOTE — LETTER
My Depression Action Plan  Name: Mata Poe   Date of Birth 1960  Date: 10/11/2017    My doctor: Mariusz Ordonez   My clinic: [address] [address] [phone]          GREEN    ZONE   Good Control    What it looks like:     Things are going generally well. You have normal up s and down s. You may even feel depressed from time to time, but bad moods usually last less than a day.   What you need to do:  1. Continue to care for yourself (see self care plan)  2. Check your depression survival kit and update it as needed  3. Follow your physician s recommendations including any medication.  4. Do not stop taking medication unless you consult with your physician first.           YELLOW         ZONE Getting Worse    What it looks like:     Depression is starting to interfere with your life.     It may be hard to get out of bed; you may be starting to isolate yourself from others.    Symptoms of depression are starting to last most all day and this has happened for several days.     You may have suicidal thoughts but they are not constant.   What you need to do:     1. Call your care team, your response to treatment will improve if you keep your care team informed of your progress. Yellow periods are signs an adjustment may need to be made.     2. Continue your self-care, even if you have to fake it!    3. Talk to someone in your support network    4. Open up your depression survival kit           RED    ZONE Medical Alert - Get Help    What it looks like:     Depression is seriously interfering with your life.     You may experience these or other symptoms: You can t get out of bed most days, can t work or engage in other necessary activities, you have trouble taking care of basic hygiene, or basic responsibilities, thoughts of suicide or death that will not go away, self-injurious behavior.     What you need to do:  1. Call your care team  and request a same-day appointment. If they are not available (weekends or after hours) call your local crisis line, emergency room or 911.      Electronically signed by: BELLA SAHU, October 11, 2017    Depression Self Care Plan / Survival Kit    Self-Care for Depression  Here s the deal. Your body and mind are really not as separate as most people think.  What you do and think affects how you feel and how you feel influences what you do and think. This means if you do things that people who feel good do, it will help you feel better.  Sometimes this is all it takes.  There is also a place for medication and therapy depending on how severe your depression is, so be sure to consult with your medical provider and/ or Behavioral Health Consultant if your symptoms are worsening or not improving.     In order to better manage my stress, I will:    Exercise  Get some form of exercise, every day. This will help reduce pain and release endorphins, the  feel good  chemicals in your brain. This is almost as good as taking antidepressants!  This is not the same as joining a gym and then never going! (they count on that by the way ) It can be as simple as just going for a walk or doing some gardening, anything that will get you moving.      Hygiene   Maintain good hygiene (Get out of bed in the morning, Make your bed, Brush your teeth, Take a shower, and Get dressed like you were going to work, even if you are unemployed).  If your clothes don't fit try to get ones that do.    Diet  I will strive to eat foods that are good for me, drink plenty of water, and avoid excessive sugar, caffeine, alcohol, and other mood-altering substances.  Some foods that are helpful in depression are: complex carbohydrates, B vitamins, flaxseed, fish or fish oil, fresh fruits and vegetables.    Psychotherapy  I agree to participate in Individual Therapy (if recommended).    Medication  If prescribed medications, I agree to take them.  Missing  doses can result in serious side effects.  I understand that drinking alcohol, or other illicit drug use, may cause potential side effects.  I will not stop my medication abruptly without first discussing it with my provider.    Staying Connected With Others  I will stay in touch with my friends, family members, and my primary care provider/team.    Use your imagination  Be creative.  We all have a creative side; it doesn t matter if it s oil painting, sand castles, or mud pies! This will also kick up the endorphins.    Witness Beauty  (AKA stop and smell the roses) Take a look outside, even in mid-winter. Notice colors, textures. Watch the squirrels and birds.     Service to others  Be of service to others.  There is always someone else in need.  By helping others we can  get out of ourselves  and remember the really important things.  This also provides opportunities for practicing all the other parts of the program.    Humor  Laugh and be silly!  Adjust your TV habits for less news and crime-drama and more comedy.    Control your stress  Try breathing deep, massage therapy, biofeedback, and meditation. Find time to relax each day.     My support system    Clinic Contact:  Phone number:    Contact 1:  Phone number:    Contact 2:  Phone number:    Spiritism/:  Phone number:    Therapist:  Phone number:    Local crisis center:    Phone number:    Other community support:  Phone number:

## 2017-10-11 NOTE — PATIENT INSTRUCTIONS
Start on the medications and then follow-up with me via [employer] in 3-4 weeks. I will send you and email reminder.

## 2017-10-12 ASSESSMENT — ANXIETY QUESTIONNAIRES: GAD7 TOTAL SCORE: 7

## 2017-10-31 ENCOUNTER — MYC MEDICAL ADVICE (OUTPATIENT)
Dept: FAMILY MEDICINE | Facility: CLINIC | Age: 57
End: 2017-10-31

## 2017-10-31 DIAGNOSIS — F41.8 MIXED ANXIETY DEPRESSIVE DISORDER: Chronic | ICD-10-CM

## 2017-10-31 DIAGNOSIS — G89.4 CHRONIC PAIN SYNDROME: Chronic | ICD-10-CM

## 2017-11-01 ENCOUNTER — E-VISIT (OUTPATIENT)
Dept: FAMILY MEDICINE | Facility: CLINIC | Age: 57
End: 2017-11-01
Payer: COMMERCIAL

## 2017-11-01 DIAGNOSIS — F41.8 MIXED ANXIETY DEPRESSIVE DISORDER: Chronic | ICD-10-CM

## 2017-11-01 DIAGNOSIS — G57.72 CAUSALGIA OF LOWER LIMB, LEFT: ICD-10-CM

## 2017-11-01 PROCEDURE — 99444 ZZC PHYSICIAN ONLINE EVALUATION & MANAGEMENT SERVICE: CPT | Performed by: FAMILY MEDICINE

## 2017-11-01 ASSESSMENT — ANXIETY QUESTIONNAIRES
1. FEELING NERVOUS, ANXIOUS, OR ON EDGE: NOT AT ALL
GAD7 TOTAL SCORE: 2
5. BEING SO RESTLESS THAT IT IS HARD TO SIT STILL: NOT AT ALL
7. FEELING AFRAID AS IF SOMETHING AWFUL MIGHT HAPPEN: SEVERAL DAYS
3. WORRYING TOO MUCH ABOUT DIFFERENT THINGS: NOT AT ALL
6. BECOMING EASILY ANNOYED OR IRRITABLE: NOT AT ALL
2. NOT BEING ABLE TO STOP OR CONTROL WORRYING: NOT AT ALL
GAD7 TOTAL SCORE: 2
GAD7 TOTAL SCORE: 2
4. TROUBLE RELAXING: SEVERAL DAYS
7. FEELING AFRAID AS IF SOMETHING AWFUL MIGHT HAPPEN: SEVERAL DAYS

## 2017-11-02 RX ORDER — PAROXETINE 10 MG/1
10 TABLET, FILM COATED ORAL AT BEDTIME
Qty: 90 TABLET | Refills: 1 | Status: SHIPPED | OUTPATIENT
Start: 2017-11-02 | End: 2018-05-05

## 2017-11-02 RX ORDER — LORAZEPAM 1 MG/1
0.5 TABLET ORAL EVERY 8 HOURS PRN
Qty: 15 TABLET | Refills: 0 | Status: SHIPPED | OUTPATIENT
Start: 2017-11-02 | End: 2018-03-13

## 2017-11-02 ASSESSMENT — ANXIETY QUESTIONNAIRES: GAD7 TOTAL SCORE: 2

## 2017-11-03 ENCOUNTER — OFFICE VISIT (OUTPATIENT)
Dept: PALLIATIVE MEDICINE | Facility: CLINIC | Age: 57
End: 2017-11-03
Payer: COMMERCIAL

## 2017-11-03 ENCOUNTER — TELEPHONE (OUTPATIENT)
Dept: FAMILY MEDICINE | Facility: CLINIC | Age: 57
End: 2017-11-03

## 2017-11-03 VITALS
SYSTOLIC BLOOD PRESSURE: 127 MMHG | BODY MASS INDEX: 30.52 KG/M2 | HEART RATE: 68 BPM | DIASTOLIC BLOOD PRESSURE: 78 MMHG | HEIGHT: 71 IN | WEIGHT: 218 LBS

## 2017-11-03 DIAGNOSIS — G90.522 COMPLEX REGIONAL PAIN SYNDROME TYPE 1 OF LEFT LOWER EXTREMITY: Primary | ICD-10-CM

## 2017-11-03 DIAGNOSIS — M25.572 CHRONIC PAIN OF LEFT ANKLE: ICD-10-CM

## 2017-11-03 DIAGNOSIS — M79.2 NEUROPATHIC PAIN: ICD-10-CM

## 2017-11-03 DIAGNOSIS — G89.29 CHRONIC PAIN OF LEFT ANKLE: ICD-10-CM

## 2017-11-03 PROCEDURE — 99214 OFFICE O/P EST MOD 30 MIN: CPT | Performed by: NURSE PRACTITIONER

## 2017-11-03 ASSESSMENT — PAIN SCALES - GENERAL: PAINLEVEL: SEVERE PAIN (6)

## 2017-11-03 NOTE — PATIENT INSTRUCTIONS
PLAN:  1. Continue home activity  2. Medications:   1. On no oral medications from our clinic  2. May trial Aspercreme with 4% lidocaine cream; this is found over-the-counter and may be used according to package instructions for topical pain relief  3. Could ask dispensary about medical cannabis balm for topical use for ankle/foot pain  4. Certified for medical marijuana by me  3. Procedures: none  4. Follow-up with me when need medical cannabis re-certification.            ----------------------------------------------------------------  Nurse Triage line:  [phone]   Call this number with any questions or concerns. You may leave a detailed message anytime. Calls are typically returned Monday through Friday between 8 AM and 4:30 PM. We usually get back to you within 2 business days depending on the issue/request.       Medication refills:    For non-narcotic medications, call your pharmacy directly to request a refill. The pharmacy will contact the [facility] for authorization. Please allow 3-4 days for these refills to be processed.     For narcotic refills, call the nurse triage line or send a [employer] message. Please contact us 7-10 days before your refill is due. The message MUST include the name of the specific medication(s) requested and how you would like to receive the prescription(s). The options are as follows:    Pain Clinic staff can mail the prescription to your pharmacy. Please tell us the name of the pharmacy.    You may pick the prescription up at the Pain Clinic (tell us the location) or during a clinic visit with your pain provider    Pain Clinic staff can deliver the prescription to the [place] pharmacy in the clinic building. Please tell us the location.      Scheduling number: [phone].  Call this number to schedule or change appointments.    We believe regular attendance is key to your success in our program.    Any time you are unable to keep your appointment we ask that  you call us at least 24 hours in advance to let us know. This will allow us to offer the appointment time to another patient.

## 2017-11-03 NOTE — MR AVS SNAPSHOT
After Visit Summary   11/3/2017    Mata Poe    MRN: [id number]           Patient Information     Date Of Birth          1960        Visit Information        Provider Department      11/3/2017 1:30 PM Jenna Parrish APRN [facility] Instructions    PLAN:  1. Continue home activity  2. Medications:   1. On no oral medications from our clinic  2. May trial Aspercreme with 4% lidocaine cream; this is found over-the-counter and may be used according to package instructions for topical pain relief  3. Could ask dispensary about medical cannabis balm for topical use for ankle/foot pain  4. Certified for medical marijuana by me  3. Procedures: none  4. Follow-up with me when need medical cannabis re-certification.            ----------------------------------------------------------------  Nurse Triage line:  [phone]   Call this number with any questions or concerns. You may leave a detailed message anytime. Calls are typically returned Monday through Friday between 8 AM and 4:30 PM. We usually get back to you within 2 business days depending on the issue/request.       Medication refills:    For non-narcotic medications, call your pharmacy directly to request a refill. The pharmacy will contact the [facility] for authorization. Please allow 3-4 days for these refills to be processed.     For narcotic refills, call the nurse triage line or send a [employer] message. Please contact us 7-10 days before your refill is due. The message MUST include the name of the specific medication(s) requested and how you would like to receive the prescription(s). The options are as follows:    Pain Clinic staff can mail the prescription to your pharmacy. Please tell us the name of the pharmacy.    You may pick the prescription up at the Pain Clinic (tell us the location) or during a clinic visit with your pain provider    Pain Clinic staff can deliver the  "prescription to the [place] pharmacy in the clinic building. Please tell us the location.      Scheduling number: [phone].  Call this number to schedule or change appointments.    We believe regular attendance is key to your success in our program.    Any time you are unable to keep your appointment we ask that you call us at least 24 hours in advance to let us know. This will allow us to offer the appointment time to another patient.               Follow-ups after your visit        Who to contact     If you have questions or need follow up information about today's clinic visit or your schedule please contact [facility] directly at [phone].  Normal or non-critical lab and imaging results will be communicated to you by MyChart, letter or phone within 4 business days after the clinic has received the results. If you do not hear from us within 7 days, please contact the clinic through [employer] or phone. If you have a critical or abnormal lab result, we will notify you by phone as soon as possible.  Submit refill requests through [employer] or call your pharmacy and they will forward the refill request to us. Please allow 3 business days for your refill to be completed.          Additional Information About Your Visit        [employer] Information     [employer] gives you secure access to your electronic health record. If you see a primary care provider, you can also send messages to your care team and make appointments. If you have questions, please call your primary care clinic.  If you do not have a primary care provider, please call [phone] and they will assist you.        Care EveryWhere ID     This is your Care EveryWhere ID. This could be used by other organizations to access your [place] medical records  [id number]        Your Vitals Were     Pulse Height BMI (Body Mass Index)             68 1.803 m (5' 11\") 30.4 kg/m2          Blood Pressure from Last 3 Encounters:   11/03/17 127/78 "   10/11/17 120/66   07/26/17 120/79    Weight from Last 3 Encounters:   11/03/17 98.9 kg (218 lb)   10/11/17 98.6 kg (217 lb 6.4 oz)   07/26/17 98 kg (216 lb)              Today, you had the following     No orders found for display       Primary Care Provider Office Phone # Fax #    Mariusz Ordonez [phone]       [address] [facility] [address]        Equal Access to Services     [facility]: Hadii aad ku hadasho Soomaali, waaxda luqadaha, qaybta kaalmada adeegyada, waxay zanain hayaan bethel serrano . So [facility] [phone].    ATENCIÓN: Si habla español, tiene a chavez disposición servicios gratuitos de asistencia lingüística. Carolyn al [phone].    We comply with applicable federal civil rights laws and [place] laws. We do not discriminate on the basis of race, color, national origin, age, disability, sex, sexual orientation, or gender identity.            Thank you!     Thank you for choosing [facility]  for your care. Our goal is always to provide you with excellent care. Hearing back from our patients is one way we can continue to improve our services. Please take a few minutes to complete the written survey that you may receive in the mail after your visit with us. Thank you!             Your Updated Medication List - Protect others around you: Learn how to safely use, store and throw away your medicines at www.disposemymeds.org.          This list is accurate as of: 11/3/17  2:07 PM.  Always use your most recent med list.                   Brand Name Dispense Instructions for use Diagnosis    alfuzosin 10 MG 24 hr tablet    UROXATRAL    90 tablet    Take 1 tablet (10 mg) by mouth daily    Benign prostatic hyperplasia with urinary obstruction       ASPIRIN EC PO      Take 81 mg by mouth daily Reported on 5/1/2017        atorvastatin 40 MG tablet    LIPITOR    90 tablet    Take 1 tablet (40 mg) by mouth daily    Hyperlipidemia LDL goal <130        latanoprost 0.005 % ophthalmic solution    XALATAN          LORazepam 1 MG tablet    ATIVAN    15 tablet    Take 0.5 tablets (0.5 mg) by mouth every 8 hours as needed for anxiety    Causalgia of lower limb, left, Mixed anxiety depressive disorder       medical cannabis inhalation (Patient's own supply.  Not a prescription)     0 Information only    (This is NOT a prescription, and does not certify that the patient has a qualifying medical condition for medical cannabis.  The purpose of this order is  to document that the patient reports taking medical cannabis.)    Chronic pain syndrome       order for DME      Respironics REMSTAR 60 Series Auto CPAP 7cm H2O, Airfit P10 nasal pillow mask w/medium pillows        PARoxetine 10 MG tablet    PAXIL    90 tablet    Take 1 tablet (10 mg) by mouth At Bedtime    Chronic pain syndrome, Mixed anxiety depressive disorder       traZODone 150 MG tablet    DESYREL    30 tablet    Take 1 tablet (150 mg) by mouth nightly as needed for sleep Needs appointment for further refills.    Disturbance in sleep behavior
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
## 2017-11-03 NOTE — TELEPHONE ENCOUNTER
Lorazepam walked over to the [facility] pharmacy.    Yvette Chefornak   Clinic Station

## 2017-11-03 NOTE — NURSING NOTE
"Chief Complaint   Patient presents with     Pain       Initial /78  Pulse 68  Ht 1.803 m (5' 11\")  Wt 98.9 kg (218 lb)  BMI 30.4 kg/m2 Estimated body mass index is 30.4 kg/(m^2) as calculated from the following:    Height as of this encounter: 1.803 m (5' 11\").    Weight as of this encounter: 98.9 kg (218 lb).  Medication Reconciliation: complete     Zoe Feng MA      "

## 2017-12-26 ENCOUNTER — OFFICE VISIT (OUTPATIENT)
Dept: FAMILY MEDICINE | Facility: CLINIC | Age: 57
End: 2017-12-26
Payer: COMMERCIAL

## 2017-12-26 VITALS
SYSTOLIC BLOOD PRESSURE: 124 MMHG | TEMPERATURE: 97.4 F | DIASTOLIC BLOOD PRESSURE: 74 MMHG | HEIGHT: 72 IN | BODY MASS INDEX: 30.34 KG/M2 | WEIGHT: 224 LBS | HEART RATE: 72 BPM

## 2017-12-26 DIAGNOSIS — M25.561 CHRONIC PAIN OF RIGHT KNEE: Primary | ICD-10-CM

## 2017-12-26 DIAGNOSIS — G47.9 DISTURBANCE IN SLEEP BEHAVIOR: ICD-10-CM

## 2017-12-26 DIAGNOSIS — G89.29 CHRONIC PAIN OF RIGHT KNEE: Primary | ICD-10-CM

## 2017-12-26 DIAGNOSIS — G89.4 CHRONIC PAIN SYNDROME: Chronic | ICD-10-CM

## 2017-12-26 PROCEDURE — 99213 OFFICE O/P EST LOW 20 MIN: CPT | Performed by: FAMILY MEDICINE

## 2017-12-26 RX ORDER — SILDENAFIL CITRATE 20 MG/1
20 TABLET ORAL PRN
COMMUNITY
Start: 2017-11-16

## 2017-12-26 RX ORDER — TRAZODONE HYDROCHLORIDE 50 MG/1
50 TABLET, FILM COATED ORAL
Qty: 90 TABLET | Refills: 3 | Status: SHIPPED | OUTPATIENT
Start: 2017-12-26 | End: 2018-10-15

## 2017-12-26 NOTE — MR AVS SNAPSHOT
After Visit Summary   12/26/2017    Mata Poe    MRN: [id number]           Patient Information     Date Of Birth          1960        Visit Information        Provider Department      12/26/2017 11:40 AM Tiana Llanes DO [facility]        Today's Diagnoses     Chronic pain of right knee    -  1    Disturbance in sleep behavior        Chronic pain syndrome          Care Instructions    I would recommend visiting with physical therapy for the knee pain.  You should be able to do this with the therapist you are already seeing.  Please call ahead and ask if you can add that on to your next visit.              Follow-ups after your visit        Additional Services     TENA PT, HAND, AND CHIROPRACTIC REFERRAL       **This order will print in the [facility] Scheduling Office**    Physical Therapy, Hand Therapy and Chiropractic Care are available through:    *[facility]  *[facility]  *[facility]    Call one number to schedule at any of the above locations: [phone].    Your provider has referred you to: Physical Therapy at [facility] or [facility]    Indication/Reason for Referral: Knee Pain  Onset of Illness: chronic  Therapy Orders: Evaluate and Treat  Special Programs: None  Special Request: None    Noris Hamm      Additional Comments for the Therapist or Chiropractor:     Please be aware that coverage of these services is subject to the terms and limitations of your health insurance plan.  Call member services at your health plan with any benefit or coverage questions.      Please bring the following to your appointment:    *Your personal calendar for scheduling future appointments  *Comfortable clothing                  Who to contact     Normal or non-critical lab and imaging results will be communicated to you by MyChart, letter or phone within 4 business days after the clinic has received the results. If you do not hear  "from us within 7 days, please contact the clinic through [facility] or phone. If you have a critical or abnormal lab result, we will notify you by phone as soon as possible.  Submit refill requests through [employer] or call your pharmacy and they will forward the refill request to us. Please allow 3 business days for your refill to be completed.          If you need to speak with a  for additional information , please call: [phone]           Additional Information About Your Visit        [facility] Information     [facility] gives you secure access to your electronic health record. If you see a primary care provider, you can also send messages to your care team and make appointments. If you have questions, please call your primary care clinic.  If you do not have a primary care provider, please call [phone] and they will assist you.        Care EveryWhere ID     This is your Care EveryWhere ID. This could be used by other organizations to access your [facility] medical records  [id number]        Your Vitals Were     Pulse Temperature Height BMI (Body Mass Index)          72 97.4  F (36.3  C) (Tympanic) 5' 11.5\" (1.816 m) 30.81 kg/m2         Blood Pressure from Last 3 Encounters:   12/26/17 124/74   11/03/17 127/78   10/11/17 120/66    Weight from Last 3 Encounters:   12/26/17 224 lb (101.6 kg)   11/03/17 218 lb (98.9 kg)   10/11/17 217 lb 6.4 oz (98.6 kg)              We Performed the Following     TENA PT, HAND, AND CHIROPRACTIC REFERRAL          Today's Medication Changes          These changes are accurate as of: 12/26/17 12:29 PM.  If you have any questions, ask your nurse or doctor.               These medicines have changed or have updated prescriptions.        Dose/Directions    * traZODone 150 MG tablet   Commonly known as:  DESYREL   This may have changed:  Another medication with the same name was added. Make sure you understand how and when to take each.   Used for:  Disturbance in sleep " behavior   Changed by:  Maribel Lancaster MD        Dose:  150 mg   Take 1 tablet (150 mg) by mouth nightly as needed for sleep Needs appointment for further refills.   Quantity:  30 tablet   Refills:  0       * traZODone 50 MG tablet   Commonly known as:  DESYREL   This may have changed:  You were already taking a medication with the same name, and this prescription was added. Make sure you understand how and when to take each.   Used for:  Disturbance in sleep behavior   Changed by:  Tiana Llanes DO        Dose:  50 mg   Take 1 tablet (50 mg) by mouth nightly as needed for sleep   Quantity:  90 tablet   Refills:  3       * Notice:  This list has 2 medication(s) that are the same as other medications prescribed for you. Read the directions carefully, and ask your doctor or other care provider to review them with you.         Where to get your medicines      These medications were sent to [facility] - [place], [place] - [facility] [address] [address] [address] [address]    Hours:  Mon-Fri 8:30am-5:00pm Toll Free [phone] Phone:  [phone]     traZODone 50 MG tablet                Primary Care Provider Office Phone # Fax #    Mariusz Ordonez [phone] [address] [facility] [facility] [address]        Equal Access to Services     [facility] IGNACIO : Hadii aad ku hadasho Soomaali, waaxda luqadaha, qaybta kaalmada adeegyada, waxsusanne subramanian. So [facility] [phone].    ATENCIÓN: Si habla español, tiene a chavez disposición servicios gratuitos de asistencia lingüística. Carolyn al [phone].    We comply with applicable federal civil rights laws and [place] laws. We do not discriminate on the basis of race, color, national origin, age, disability, sex, sexual orientation, or gender identity.            Thank you!     Thank you for choosing [facility]  for your care. Our goal is always to provide you with  excellent care. Hearing back from our patients is one way we can continue to improve our services. Please take a few minutes to complete the written survey that you may receive in the mail after your visit with us. Thank you!             Your Updated Medication List - Protect others around you: Learn how to safely use, store and throw away your medicines at www.disposemymeds.org.          This list is accurate as of: 12/26/17 12:29 PM.  Always use your most recent med list.                   Brand Name Dispense Instructions for use Diagnosis    alfuzosin 10 MG 24 hr tablet    UROXATRAL    90 tablet    Take 1 tablet (10 mg) by mouth daily    Benign prostatic hyperplasia with urinary obstruction       ASPIRIN EC PO      Take 81 mg by mouth daily Reported on 5/1/2017        atorvastatin 40 MG tablet    LIPITOR    90 tablet    Take 1 tablet (40 mg) by mouth daily    Hyperlipidemia LDL goal <130       latanoprost 0.005 % ophthalmic solution    XALATAN          LORazepam 1 MG tablet    ATIVAN    15 tablet    Take 0.5 tablets (0.5 mg) by mouth every 8 hours as needed for anxiety    Causalgia of lower limb, left, Mixed anxiety depressive disorder       medical cannabis inhalation (Patient's own supply.  Not a prescription)     0 Information only    (This is NOT a prescription, and does not certify that the patient has a qualifying medical condition for medical cannabis.  The purpose of this order is  to document that the patient reports taking medical cannabis.)    Chronic pain syndrome       order for [facility]      Respironics REMSTAR 60 Series Auto CPAP 7cm H2O, Airfit P10 nasal pillow mask w/medium pillows        PARoxetine 10 MG tablet    PAXIL    90 tablet    Take 1 tablet (10 mg) by mouth At Bedtime    Chronic pain syndrome, Mixed anxiety depressive disorder       sildenafil 20 MG tablet    REVATIO     Take 20 mg by mouth as needed        * traZODone 150 MG tablet    DESYREL    30 tablet    Take 1 tablet (150 mg) by mouth  nightly as needed for sleep Needs appointment for further refills.    Disturbance in sleep behavior       * traZODone 50 MG tablet    DESYREL    90 tablet    Take 1 tablet (50 mg) by mouth nightly as needed for sleep    Disturbance in sleep behavior       * Notice:  This list has 2 medication(s) that are the same as other medications prescribed for you. Read the directions carefully, and ask your doctor or other care provider to review them with you.

## 2017-12-26 NOTE — PATIENT INSTRUCTIONS
I would recommend visiting with physical therapy for the knee pain.  You should be able to do this with the therapist you are already seeing.  Please call ahead and ask if you can add that on to your next visit.

## 2017-12-26 NOTE — NURSING NOTE
"Chief Complaint   Patient presents with     Recheck Medication     was a patient of Dr. Ordonez     Musculoskeletal Problem     right knee       Initial /74  Pulse 72  Temp 97.4  F (36.3  C) (Tympanic)  Ht 5' 11.5\" (1.816 m)  Wt 224 lb (101.6 kg)  BMI 30.81 kg/m2 Estimated body mass index is 30.81 kg/(m^2) as calculated from the following:    Height as of this encounter: 5' 11.5\" (1.816 m).    Weight as of this encounter: 224 lb (101.6 kg).  Medication Reconciliation: complete       " Detailed exam

## 2017-12-26 NOTE — PROGRESS NOTES
SUBJECTIVE:   Mata Poe is a 57 year old male who presents to clinic today for the following health issues:      Joint Pain    Onset: worsened over last 2 months    Description:   Location: right knee  Character: Sharp and Snapping    Intensity: severe    Progression of Symptoms: worse    Accompanying Signs & Symptoms:  Other symptoms: none    History:   Previous similar pain: no       Precipitating factors:   Trauma or overuse: no     Alleviating factors:  Improved by: rest/inactivity and massage    Therapies Tried and outcome: none      Has clicking in the front of the knee.  This has been going on for over a year, perhaps worse over the last few months.  No injury at onset.  Was seen by sports med and recommended to do PT but did not schedule.    *  Follows with pain management for chronic regional pain syndrome on the L.  Feels this is going okay.    * uses trazodone for sleep.  Has only been using 50mg nightly.  Would like to fill for a smaller tablet.  Feels the lower dose works well.     Problem list and histories reviewed & adjusted, as indicated.  Additional history: as documented    Reviewed and updated as needed this visit by clinical staffTobacco  Allergies  Meds  Problems  Med Hx  Surg Hx  Fam Hx  Soc Hx        Reviewed and updated as needed this visit by Provider  Tobacco  Allergies  Meds  Problems  Med Hx  Surg Hx  Fam Hx  Soc Hx        ROS: Remainder of Constitutional, CV, Respiratory, GI,  negative with exception of that mentioned above    PE:  VS as above   Gen:  WN/WD/WH male in NAD   MSK:  R knee without effusion.  Snapping tendon noted just lateral of patella with flexion and extension of knee.  Mild medial and lateral joint line tenderness.  Normal ligamentous exam but pt with discomfort with all exam moves.    A/P:      ICD-10-CM    1. Chronic pain of right knee M25.561 TENA PT, HAND, AND CHIROPRACTIC REFERRAL    G89.29    2. Chronic pain syndrome G89.4    3.  Disturbance in sleep behavior G47.9 traZODone (DESYREL) 50 MG tablet     Pt also requested handicap parking certificate.  He states nothing is new with his chronic pain but he has never thought to ask in the past.  1 year certificate form given.    Patient Instructions   I would recommend visiting with physical therapy for the knee pain.  You should be able to do this with the therapist you are already seeing.  Please call ahead and ask if you can add that on to your next visit.        Pt has been seen in the past by sports medicine for this knee pain.  Rehab was recommended but pt did not want to pursue.  Would be willing to consider now.

## 2018-03-13 DIAGNOSIS — G57.72 CAUSALGIA OF LOWER LIMB, LEFT: ICD-10-CM

## 2018-03-13 DIAGNOSIS — F41.8 MIXED ANXIETY DEPRESSIVE DISORDER: Chronic | ICD-10-CM

## 2018-03-13 NOTE — TELEPHONE ENCOUNTER
Requested Prescriptions   Pending Prescriptions Disp Refills     LORazepam (ATIVAN) 1 MG tablet 15 tablet 0     Sig: Take 0.5 tablets (0.5 mg) by mouth every 8 hours as needed for anxiety    There is no refill protocol information for this order        Last Written Prescription Date:  11/2/17  Last Fill Quantity: 15,  # refills: 0   Last office visit: 12/26/2017 with prescribing provider:  10/11/17   Future Office Visit:

## 2018-03-14 RX ORDER — LORAZEPAM 1 MG/1
0.5 TABLET ORAL EVERY 8 HOURS PRN
Qty: 15 TABLET | Refills: 0 | Status: SHIPPED | OUTPATIENT
Start: 2018-03-14 | End: 2018-06-15

## 2018-03-14 NOTE — TELEPHONE ENCOUNTER
Routing refill request to provider for review/approval because:  Drug not on the [facility] refill protocol  Blank Granado RN

## 2018-05-05 DIAGNOSIS — G89.4 CHRONIC PAIN SYNDROME: Chronic | ICD-10-CM

## 2018-05-05 DIAGNOSIS — F41.8 MIXED ANXIETY DEPRESSIVE DISORDER: Chronic | ICD-10-CM

## 2018-05-05 DIAGNOSIS — E78.5 HYPERLIPIDEMIA LDL GOAL <130: Chronic | ICD-10-CM

## 2018-05-09 ENCOUNTER — MYC MEDICAL ADVICE (OUTPATIENT)
Dept: FAMILY MEDICINE | Facility: CLINIC | Age: 58
End: 2018-05-09

## 2018-05-09 RX ORDER — ATORVASTATIN CALCIUM 40 MG/1
40 TABLET, FILM COATED ORAL DAILY
Qty: 90 TABLET | Refills: 3 | Status: SHIPPED | OUTPATIENT
Start: 2018-05-09 | End: 2019-06-13

## 2018-05-09 RX ORDER — PAROXETINE 10 MG/1
10 TABLET, FILM COATED ORAL AT BEDTIME
Qty: 90 TABLET | Refills: 1 | Status: SHIPPED | OUTPATIENT
Start: 2018-05-09 | End: 2018-10-15

## 2018-05-09 ASSESSMENT — ANXIETY QUESTIONNAIRES
2. NOT BEING ABLE TO STOP OR CONTROL WORRYING: NOT AT ALL
4. TROUBLE RELAXING: SEVERAL DAYS
3. WORRYING TOO MUCH ABOUT DIFFERENT THINGS: SEVERAL DAYS
5. BEING SO RESTLESS THAT IT IS HARD TO SIT STILL: NOT AT ALL
1. FEELING NERVOUS, ANXIOUS, OR ON EDGE: SEVERAL DAYS
7. FEELING AFRAID AS IF SOMETHING AWFUL MIGHT HAPPEN: NOT AT ALL
7. FEELING AFRAID AS IF SOMETHING AWFUL MIGHT HAPPEN: NOT AT ALL
6. BECOMING EASILY ANNOYED OR IRRITABLE: NOT AT ALL
GAD7 TOTAL SCORE: 3

## 2018-05-09 ASSESSMENT — PATIENT HEALTH QUESTIONNAIRE - PHQ9
10. IF YOU CHECKED OFF ANY PROBLEMS, HOW DIFFICULT HAVE THESE PROBLEMS MADE IT FOR YOU TO DO YOUR WORK, TAKE CARE OF THINGS AT HOME, OR GET ALONG WITH OTHER PEOPLE: SOMEWHAT DIFFICULT
SUM OF ALL RESPONSES TO PHQ QUESTIONS 1-9: 4
SUM OF ALL RESPONSES TO PHQ QUESTIONS 1-9: 4

## 2018-05-09 NOTE — TELEPHONE ENCOUNTER
Routing refill request to provider for review/approval because:  PHQ-9 due as well as labs. I did send his the questionnaires thru my Chart. Blank Granado RN

## 2018-05-10 ASSESSMENT — PATIENT HEALTH QUESTIONNAIRE - PHQ9: SUM OF ALL RESPONSES TO PHQ QUESTIONS 1-9: 4

## 2018-05-10 ASSESSMENT — ANXIETY QUESTIONNAIRES: GAD7 TOTAL SCORE: 3

## 2018-05-22 ENCOUNTER — OFFICE VISIT (OUTPATIENT)
Dept: PALLIATIVE MEDICINE | Facility: CLINIC | Age: 58
End: 2018-05-22
Payer: COMMERCIAL

## 2018-05-22 VITALS
HEART RATE: 69 BPM | DIASTOLIC BLOOD PRESSURE: 80 MMHG | WEIGHT: 216 LBS | BODY MASS INDEX: 29.71 KG/M2 | SYSTOLIC BLOOD PRESSURE: 127 MMHG

## 2018-05-22 DIAGNOSIS — G90.522 COMPLEX REGIONAL PAIN SYNDROME TYPE 1 OF LEFT LOWER EXTREMITY: Primary | ICD-10-CM

## 2018-05-22 DIAGNOSIS — M25.572 CHRONIC PAIN OF LEFT ANKLE: ICD-10-CM

## 2018-05-22 DIAGNOSIS — G89.29 CHRONIC PAIN OF LEFT ANKLE: ICD-10-CM

## 2018-05-22 DIAGNOSIS — M79.2 NEUROPATHIC PAIN: ICD-10-CM

## 2018-05-22 PROCEDURE — 99214 OFFICE O/P EST MOD 30 MIN: CPT | Performed by: NURSE PRACTITIONER

## 2018-05-22 ASSESSMENT — PAIN SCALES - GENERAL: PAINLEVEL: SEVERE PAIN (6)
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
## 2018-05-22 NOTE — PATIENT INSTRUCTIONS
PLAN  1. Medications:   1. I re-certified you for medical marijuana today. You will receive an e-mail with further instructions. Continue medical marijuana as per dispensary recommendation.   2. Consider the use of medical marijuana balm.  3. Continue Trazodone as managed by primary care provider.  2. Procedures: none at present  3. Follow-up with me in early May 2019  4. I am willing to sign a disability parking permit for you for a year's duration. You can fill out the form and drop it off at the  here at [place] and I'll mail it to your home.         ----------------------------------------------------------------  Nurse Triage line:  [phone]   Call this number with any questions or concerns. You may leave a detailed message anytime. Calls are typically returned Monday through Friday between 8 AM and 4:30 PM. We usually get back to you within 2 business days depending on the issue/request.       Medication refills:    For non-narcotic medications, call your pharmacy directly to request a refill. The pharmacy will contact the [facility] for authorization. Please allow 3-4 days for these refills to be processed.     For narcotic refills, call the nurse triage line or send a [employer] message. Please contact us 7-10 days before your refill is due. The message MUST include the name of the specific medication(s) requested and how you would like to receive the prescription(s). The options are as follows:    Pain Clinic staff can mail the prescription to your pharmacy. Please tell us the name of the pharmacy.    You may pick the prescription up at the Pain Clinic (tell us the location) or during a clinic visit with your pain provider    Pain Clinic staff can deliver the prescription to the [facility] pharmacy in the clinic building. Please tell us the location.      Scheduling number: [phone].  Call this number to schedule or change appointments.    We believe regular attendance is key to your  success in our program.    Any time you are unable to keep your appointment we ask that you call us at least 24 hours in advance to let us know. This will allow us to offer the appointment time to another patient.

## 2018-05-22 NOTE — MR AVS SNAPSHOT
After Visit Summary   5/22/2018    Mata Poe    MRN: [id number]           Patient Information     Date Of Birth          1960        Visit Information        Provider Department      5/22/2018 3:30 PM Jenna Parrish APRN [facility] Instructions    PLAN  1. Medications:   1. I re-certified you for medical marijuana today. You will receive an e-mail with further instructions. Continue medical marijuana as per dispensary recommendation.   2. Consider the use of medical marijuana balm.  3. Continue Trazodone as managed by primary care provider.  2. Procedures: none at present  3. Follow-up with me in early May 2019  4. I am willing to sign a disability parking permit for you for a year's duration. You can fill out the form and drop it off at the  here at [place] and I'll mail it to your home.         ----------------------------------------------------------------  Nurse Triage line:  [phone]   Call this number with any questions or concerns. You may leave a detailed message anytime. Calls are typically returned Monday through Friday between 8 AM and 4:30 PM. We usually get back to you within 2 business days depending on the issue/request.       Medication refills:    For non-narcotic medications, call your pharmacy directly to request a refill. The pharmacy will contact the [facility] for authorization. Please allow 3-4 days for these refills to be processed.     For narcotic refills, call the nurse triage line or send a [employer] message. Please contact us 7-10 days before your refill is due. The message MUST include the name of the specific medication(s) requested and how you would like to receive the prescription(s). The options are as follows:    Pain Clinic staff can mail the prescription to your pharmacy. Please tell us the name of the pharmacy.    You may pick the prescription up at the Pain Clinic (tell us the location) or  during a clinic visit with your pain provider    Pain Clinic staff can deliver the prescription to the [facility] pharmacy in the clinic building. Please tell us the location.      Scheduling number: [phone].  Call this number to schedule or change appointments.    We believe regular attendance is key to your success in our program.    Any time you are unable to keep your appointment we ask that you call us at least 24 hours in advance to let us know. This will allow us to offer the appointment time to another patient.               Follow-ups after your visit        Who to contact     If you have questions or need follow up information about today's clinic visit or your schedule please contact [facility] directly at [phone].  Normal or non-critical lab and imaging results will be communicated to you by MyChart, letter or phone within 4 business days after the clinic has received the results. If you do not hear from us within 7 days, please contact the clinic through [facility] or phone. If you have a critical or abnormal lab result, we will notify you by phone as soon as possible.  Submit refill requests through [employer] or call your pharmacy and they will forward the refill request to us. Please allow 3 business days for your refill to be completed.          Additional Information About Your Visit        [facility] Information     [employer] gives you secure access to your electronic health record. If you see a primary care provider, you can also send messages to your care team and make appointments. If you have questions, please call your primary care clinic.  If you do not have a primary care provider, please call [phone] and they will assist you.        Care EveryWhere ID     This is your Care EveryWhere ID. This could be used by other organizations to access your [facility] medical records  [id number]        Your Vitals Were     Pulse BMI (Body Mass Index)                69 29.71 kg/m2            Blood Pressure from Last 3 Encounters:   05/22/18 127/80   12/26/17 124/74   11/03/17 127/78    Weight from Last 3 Encounters:   05/22/18 98 kg (216 lb)   12/26/17 101.6 kg (224 lb)   11/03/17 98.9 kg (218 lb)              Today, you had the following     No orders found for display       Primary Care Provider Office Phone # Fax #    Tiana LlanesDO [phone] [address] [place] [address]        Equal Access to Services     [facility]: Hadii aad ku hadasho Soomaali, waaxda luqadaha, qaybta kaalmada adeegyada, olivia serrano . So [facility] [phone].    ATENCIÓN: Si habla español, tiene a chavez disposición servicios gratuitos de asistencia lingüística. [facility] [phone].    We comply with applicable federal civil rights laws and [place] laws. We do not discriminate on the basis of race, color, national origin, age, disability, sex, sexual orientation, or gender identity.            Thank you!     Thank you for choosing [facility]  for your care. Our goal is always to provide you with excellent care. Hearing back from our patients is one way we can continue to improve our services. Please take a few minutes to complete the written survey that you may receive in the mail after your visit with us. Thank you!             Your Updated Medication List - Protect others around you: Learn how to safely use, store and throw away your medicines at www.disposemymeds.org.          This list is accurate as of 5/22/18  4:09 PM.  Always use your most recent med list.                   Brand Name Dispense Instructions for use Diagnosis    alfuzosin 10 MG 24 hr tablet    UROXATRAL    90 tablet    Take 1 tablet (10 mg) by mouth daily    Benign prostatic hyperplasia with urinary obstruction       ASPIRIN EC PO      Take 81 mg by mouth daily Reported on 5/1/2017        atorvastatin 40 MG tablet    LIPITOR    90 tablet    Take 1 tablet (40 mg) by mouth daily     Hyperlipidemia LDL goal <130       latanoprost 0.005 % ophthalmic solution    XALATAN          LORazepam 1 MG tablet    ATIVAN    15 tablet    Take 0.5 tablets (0.5 mg) by mouth every 8 hours as needed for anxiety    Causalgia of lower limb, left, Mixed anxiety depressive disorder       medical cannabis inhalation (Patient's own supply.  Not a prescription)     0 Information only    (This is NOT a prescription, and does not certify that the patient has a qualifying medical condition for medical cannabis.  The purpose of this order is  to document that the patient reports taking medical cannabis.)    Chronic pain syndrome       order for [facility]      Respironics REMSTAR 60 Series Auto CPAP 7cm H2O, Airfit P10 nasal pillow mask w/medium pillows        PARoxetine 10 MG tablet    PAXIL    90 tablet    Take 1 tablet (10 mg) by mouth At Bedtime    Chronic pain syndrome, Mixed anxiety depressive disorder       sildenafil 20 MG tablet    REVATIO     Take 20 mg by mouth as needed        * traZODone 150 MG tablet    DESYREL    30 tablet    Take 1 tablet (150 mg) by mouth nightly as needed for sleep Needs appointment for further refills.    Disturbance in sleep behavior       * traZODone 50 MG tablet    DESYREL    90 tablet    Take 1 tablet (50 mg) by mouth nightly as needed for sleep    Disturbance in sleep behavior       * Notice:  This list has 2 medication(s) that are the same as other medications prescribed for you. Read the directions carefully, and ask your doctor or other care provider to review them with you.

## 2018-06-15 DIAGNOSIS — G57.72 CAUSALGIA OF LOWER LIMB, LEFT: ICD-10-CM

## 2018-06-15 DIAGNOSIS — F41.8 MIXED ANXIETY DEPRESSIVE DISORDER: Chronic | ICD-10-CM

## 2018-06-15 NOTE — TELEPHONE ENCOUNTER
Lorazepam 1mg      Last Written Prescription Date:  03/14/2018 #15 x 0  Last filled - not provided  Last Office Visit: 12/26/2017 ANNY Llanes  Future Office visit:  None    Routing refill request to provider for review/approval because:  Drug not on the [facility], [facility] or [facility] refill protocol or controlled substance

## 2018-06-18 NOTE — TELEPHONE ENCOUNTER
Routing refill request to provider for review/approval because:  Drug not on the [facility] refill protocol   Melinda NGUYEN RN

## 2018-06-20 RX ORDER — LORAZEPAM 1 MG/1
0.5 TABLET ORAL EVERY 8 HOURS PRN
Qty: 15 TABLET | Refills: 0 | Status: SHIPPED | OUTPATIENT
Start: 2018-06-20 | End: 2018-08-27

## 2018-06-21 ENCOUNTER — TELEPHONE (OUTPATIENT)
Dept: PALLIATIVE MEDICINE | Facility: CLINIC | Age: 58
End: 2018-06-21

## 2018-06-21 NOTE — TELEPHONE ENCOUNTER
Patient dropped off Application for Disability Parking.Put in Jenna Parrish's in-basket to complete. To copy for scanning and mail original to patient's home address when complete.

## 2018-06-22 NOTE — TELEPHONE ENCOUNTER
I will complete form on 6/26/2018  Jenna MOSLEY, RN CNP, FNP  [facility] [place]

## 2018-07-02 NOTE — TELEPHONE ENCOUNTER
Reviewed and filled out form, placed in locked cabinet in TPI cart at [place] in [facility] [place]. Form should be mailed to patient.   Jenna MOSLEY RN CNP, FNP  [facility] [place]

## 2018-07-03 NOTE — TELEPHONE ENCOUNTER
Completed form copied and copy sent to scanning. Original mailed to patient's home address. Patient is aware.

## 2018-08-13 ENCOUNTER — APPOINTMENT (OUTPATIENT)
Dept: GENERAL RADIOLOGY | Facility: CLINIC | Age: 58
End: 2018-08-13
Attending: PHYSICIAN ASSISTANT
Payer: COMMERCIAL

## 2018-08-13 ENCOUNTER — HOSPITAL ENCOUNTER (EMERGENCY)
Facility: CLINIC | Age: 58
Discharge: HOME | End: 2018-08-13
Attending: PHYSICIAN ASSISTANT | Admitting: PHYSICIAN ASSISTANT
Payer: COMMERCIAL

## 2018-08-13 VITALS
TEMPERATURE: 98.2 F | RESPIRATION RATE: 16 BRPM | WEIGHT: 210 LBS | OXYGEN SATURATION: 96 % | SYSTOLIC BLOOD PRESSURE: 125 MMHG | HEIGHT: 71 IN | HEART RATE: 86 BPM | DIASTOLIC BLOOD PRESSURE: 81 MMHG | BODY MASS INDEX: 29.4 KG/M2
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
## 2018-08-13 DIAGNOSIS — M79.601 RIGHT ARM PAIN: Primary | ICD-10-CM

## 2018-08-13 PROCEDURE — 73060 X-RAY EXAM OF HUMERUS: CPT | Mod: RT

## 2018-08-13 PROCEDURE — 73090 X-RAY EXAM OF FOREARM: CPT | Mod: RT

## 2018-08-13 PROCEDURE — 99214 OFFICE O/P EST MOD 30 MIN: CPT | Mod: Z6 | Performed by: PHYSICIAN ASSISTANT

## 2018-08-13 PROCEDURE — 73110 X-RAY EXAM OF WRIST: CPT | Mod: RT

## 2018-08-13 PROCEDURE — G0463 HOSPITAL OUTPT CLINIC VISIT: HCPCS | Performed by: PHYSICIAN ASSISTANT

## 2018-08-13 NOTE — ED AVS SNAPSHOT
[facility]    [address] [address]    Phone:  [phone]    Fax:  [phone]                                       Mata Poe   MRN: [id number]    Department:  [facility]   Date of Visit:  8/13/2018           After Visit Summary Signature Page     I have received my discharge instructions, and my questions have been answered. I have discussed any challenges I see with this plan with the nurse or doctor.    ..........................................................................................................................................  Patient/Patient Representative Signature      ..........................................................................................................................................  Patient Representative Print Name and Relationship to Patient    ..................................................               ................................................  Date                                            Time    ..........................................................................................................................................  Reviewed by Signature/Title    ...................................................              ..............................................  Date                                                            Time

## 2018-08-13 NOTE — ED AVS SNAPSHOT
[facility]    [address] [address]    Phone:  [phone]    Fax:  [phone]                                       Mata Poe   MRN: [id number]    Department:  [facility]   Date of Visit:  8/13/2018           Patient Information     Date Of Birth          1960        Your diagnoses for this visit were:     Right arm pain     Fall, initial encounter        You were seen by Brianda Norris PA-C.      Follow-up Information     Follow up with Tiana Llanes DO. Call in 1 week.    Specialties:  Family Practice, Urgent Care    Why:  As needed, For persistent symptoms    Contact information:    [address] [place] [address]  [phone]          Follow up with [facility].    Specialty:  EMERGENCY MEDICINE    Why:  As needed, If symptoms worsen    Contact information:    [facility] [facility] [address] [phone]    Additional information:    The medical center is located at   [address] [place] [address] (between [facility] and   [place] [address] in [place], four miles north   of [place]).        Discharge Instructions         R.I.C.E.  R.I.C.E. stands for Rest, Ice, Compression, and Elevation. Doing these things helps limit pain and swelling after an injury. R.I.C.E. also helps injuries heal faster. Use R.I.C.E. for sprains, strains, and severe bruises or bumps. Follow the tips on this handout and begin R.I.C.E. as soon as possible after an injury.  ? Rest  Pain is your body s way of telling you to rest an injured area. Whether you have hurt an elbow, hand, foot, or knee, limiting its use will prevent further injury and help you heal.  ? Ice  Applying ice right after an injury helps prevent swelling and reduce pain. Don t place ice directly on your skin.    Wrap a cold pack or bag of ice in a thin cloth. Place it over the injured area.    Ice for 10 minutes every 3 hours. Don t ice for more  than 20 minutes at a time.  ? Compression  Putting pressure (compression) on an injury helps prevent swelling and provides support.    Wrap the injured area firmly with an elastic bandage. If your hand or foot tingles, becomes discolored, or feels cold to the touch, the bandage may be too tight. Rewrap it more loosely.    If your bandage becomes too loose, rewrap it.    Do not wear an elastic bandage overnight.  ? Elevation  Keeping an injury elevated helps reduce swelling, pain, and throbbing. Elevation is most effective when the injury is kept elevated higher than the heart.     Call your healthcare provider if you notice any of the following:    Fingers or toes feel numb, are cold to the touch, or change color    Skin looks shiny or tight    Pain, swelling, or bruising worsens and is not improved with elevation   Date Last Reviewed: 9/3/2015    2065-6356 [employer]. [address] [place] [address]. All rights reserved. This information is not intended as a substitute for professional medical care. Always follow your healthcare professional's instructions.          24 Hour Appointment Hotline       To make an appointment at any [facility], call [phone] ([phone]). If you don't have a family doctor or clinic, we will help you find one. [facility] clinics are conveniently located to serve the needs of you and your family.             Review of your medicines      Our records show that you are taking the medicines listed below. If these are incorrect, please call your family doctor or clinic.        Dose / Directions Last dose taken    alfuzosin 10 MG 24 hr tablet   Commonly known as:  UROXATRAL   Dose:  10 mg   Quantity:  90 tablet        Take 1 tablet (10 mg) by mouth daily   Refills:  3        ASPIRIN EC PO   Dose:  81 mg        Take 81 mg by mouth daily Reported on 5/1/2017   Refills:  0        atorvastatin 40 MG tablet   Commonly known as:  LIPITOR   Dose:  40 mg    Quantity:  90 tablet        Take 1 tablet (40 mg) by mouth daily   Refills:  3        latanoprost 0.005 % ophthalmic solution   Commonly known as:  XALATAN        Refills:  0        LORazepam 1 MG tablet   Commonly known as:  ATIVAN   Dose:  0.5 mg   Quantity:  15 tablet        Take 0.5 tablets (0.5 mg) by mouth every 8 hours as needed for anxiety   Refills:  0        medical cannabis inhalation (Patient's own supply.  Not a prescription)   Quantity:  0 Information only        (This is NOT a prescription, and does not certify that the patient has a qualifying medical condition for medical cannabis.  The purpose of this order is  to document that the patient reports taking medical cannabis.)   Refills:  0        order for [facility]        Respir[employer]angbas REMSTAR 60 Series Auto CPAP 7cm H2O, Airfit P10 nasal pillow mask w/medium pillows   Refills:  0        PARoxetine 10 MG tablet   Commonly known as:  PAXIL   Dose:  10 mg   Quantity:  90 tablet        Take 1 tablet (10 mg) by mouth At Bedtime   Refills:  1        sildenafil 20 MG tablet   Commonly known as:  REVATIO   Dose:  20 mg        Take 20 mg by mouth as needed   Refills:  0        * traZODone 150 MG tablet   Commonly known as:  DESYREL   Dose:  150 mg   Quantity:  30 tablet        Take 1 tablet (150 mg) by mouth nightly as needed for sleep Needs appointment for further refills.   Refills:  0        * traZODone 50 MG tablet   Commonly known as:  DESYREL   Dose:  50 mg   Quantity:  90 tablet        Take 1 tablet (50 mg) by mouth nightly as needed for sleep   Refills:  3        * Notice:  This list has 2 medication(s) that are the same as other medications prescribed for you. Read the directions carefully, and ask your doctor or other care provider to review them with you.            Procedures and tests performed during your visit     Humerus XR, G/E 2 views, right    Radius/Ulna XR, PA & LAT, right    XR Wrist Right G/E 3 Views      Orders Needing Specimen Collection      None      Pending Results     Date and Time Order Name Status Description    8/13/2018 1446 XR Wrist Right G/E 3 Views Preliminary     8/13/2018 1446 Humerus XR, G/E 2 views, right Preliminary     8/13/2018 1446 Radius/Ulna XR, PA & LAT, right Preliminary             Pending Culture Results     No orders found from 8/11/2018 to 8/14/2018.            Pending Results Instructions     If you had any lab results that were not finalized at the time of your Discharge, you can call the ED Lab Result RN at [phone]. You will be contacted by this team for any positive Lab results or changes in treatment. The nurses are available 7 days a week from 10A to 6:30P.  You can leave a message 24 hours per day and they will return your call.        Test Results From Your Hospital Stay        8/13/2018  3:05 PM      Narrative     FOREARM RIGHT TWO VIEWS August 13, 2018 3:02 PM     HISTORY: Fall onto arm, tenderness throughout forearm.     COMPARISON: None.        Impression     IMPRESSION: No acute fracture or dislocation. Anatomic alignment.         8/13/2018  3:07 PM      Narrative     RIGHT HUMERUS TWO OR MORE VIEWS   8/13/2018 3:02 PM     HISTORY: Fall, tenderness of shoulder and upper arm.     COMPARISON: Chest x-ray 8/11/2014.        Impression     IMPRESSION: No acute fracture or dislocation. Stable degenerative  joint disease change at the right acromioclavicular joint. Stable  superior subluxation of the distal right clavicle relative to the  acromioclavicular joint that was also present on the prior chest x-ray  from 2014.         8/13/2018  3:05 PM      Narrative     RIGHT WRIST THREE OR MORE VIEWS   8/13/2018 3:01 PM     HISTORY:  Fall, tenderness throughout wrist.     COMPARISON: None.        Impression     IMPRESSION: No acute fracture or dislocation. Anatomic alignment. Mild  degenerative joint disease change with small osteophytes at the thumb  base in the region of the carpometacarpal joint.                 Thank you for choosing [facility]       Thank you for choosing [facility] for your care. Our goal is always to provide you with excellent care. Hearing back from our patients is one way we can continue to improve our services. Please take a few minutes to complete the written survey that you may receive in the mail after you visit with us. Thank you!        [employer] Information     [employer] gives you secure access to your electronic health record. If you see a primary care provider, you can also send messages to your care team and make appointments. If you have questions, please call your primary care clinic.  If you do not have a primary care provider, please call [phone] and they will assist you.        Care EveryWhere ID     This is your Care EveryWhere ID. This could be used by other organizations to access your [facility] medical records  [id number]        Equal Access to Services     [facility] : Vinita Corbett, renata rothman, olivia guzman. So [facility] [phone].    ATENCIÓN: Si habla español, tiene a chavez disposición servicios gratuitos de asistencia lingüística. Llame al [phone].    We comply with applicable federal civil rights laws and [place] laws. We do not discriminate on the basis of race, color, national origin, age, disability, sex, sexual orientation, or gender identity.            After Visit Summary       This is your record. Keep this with you and show to your community pharmacist(s) and doctor(s) at your next visit.

## 2018-08-13 NOTE — DISCHARGE INSTRUCTIONS
R.I.C.E.  R.I.C.E. stands for Rest, Ice, Compression, and Elevation. Doing these things helps limit pain and swelling after an injury. R.I.C.E. also helps injuries heal faster. Use R.I.C.E. for sprains, strains, and severe bruises or bumps. Follow the tips on this handout and begin R.I.C.E. as soon as possible after an injury.  ? Rest  Pain is your body s way of telling you to rest an injured area. Whether you have hurt an elbow, hand, foot, or knee, limiting its use will prevent further injury and help you heal.  ? Ice  Applying ice right after an injury helps prevent swelling and reduce pain. Don t place ice directly on your skin.    Wrap a cold pack or bag of ice in a thin cloth. Place it over the injured area.    Ice for 10 minutes every 3 hours. Don t ice for more than 20 minutes at a time.  ? Compression  Putting pressure (compression) on an injury helps prevent swelling and provides support.    Wrap the injured area firmly with an elastic bandage. If your hand or foot tingles, becomes discolored, or feels cold to the touch, the bandage may be too tight. Rewrap it more loosely.    If your bandage becomes too loose, rewrap it.    Do not wear an elastic bandage overnight.  ? Elevation  Keeping an injury elevated helps reduce swelling, pain, and throbbing. Elevation is most effective when the injury is kept elevated higher than the heart.     Call your healthcare provider if you notice any of the following:    Fingers or toes feel numb, are cold to the touch, or change color    Skin looks shiny or tight    Pain, swelling, or bruising worsens and is not improved with elevation   Date Last Reviewed: 9/3/2015    6235-8345 [employer]. [address] [place] [address], [place] [address]. All rights reserved. This information is not intended as a substitute for professional medical care. Always follow your healthcare professional's instructions.
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
## 2018-08-13 NOTE — ED TRIAGE NOTES
Patient here for pain in the R forearm - fell on Thursday. Patient presents ambulatory to the urgent care.

## 2018-08-15 ASSESSMENT — ENCOUNTER SYMPTOMS
NEUROLOGICAL NEGATIVE: 1
CONSTITUTIONAL NEGATIVE: 1

## 2018-08-27 DIAGNOSIS — F41.8 MIXED ANXIETY DEPRESSIVE DISORDER: Chronic | ICD-10-CM

## 2018-08-27 DIAGNOSIS — G57.72 CAUSALGIA OF LOWER LIMB, LEFT: ICD-10-CM

## 2018-08-27 NOTE — TELEPHONE ENCOUNTER
Lorazepam 1mg      Last Written Prescription Date:  06/20/2018 #15 x 0  Last filled - not provided  Last Office Visit: 12/26/2017 ANNY Llanes  Future Office visit:   None    Routing refill request to provider for review/approval because:  Drug not on the [facility], P or [facility] refill protocol or controlled substance

## 2018-08-28 RX ORDER — LORAZEPAM 1 MG/1
0.5 TABLET ORAL EVERY 8 HOURS PRN
Qty: 15 TABLET | Refills: 0 | Status: SHIPPED | OUTPATIENT
Start: 2018-08-28 | End: 2018-11-26

## 2018-09-14 DIAGNOSIS — G47.33 OSA (OBSTRUCTIVE SLEEP APNEA): Primary | Chronic | ICD-10-CM

## 2018-10-15 DIAGNOSIS — F41.8 MIXED ANXIETY DEPRESSIVE DISORDER: Chronic | ICD-10-CM

## 2018-10-15 DIAGNOSIS — G47.9 DISTURBANCE IN SLEEP BEHAVIOR: ICD-10-CM

## 2018-10-15 DIAGNOSIS — G89.4 CHRONIC PAIN SYNDROME: Chronic | ICD-10-CM

## 2018-10-16 ENCOUNTER — MYC MEDICAL ADVICE (OUTPATIENT)
Dept: FAMILY MEDICINE | Facility: CLINIC | Age: 58
End: 2018-10-16

## 2018-10-16 RX ORDER — PAROXETINE 10 MG/1
TABLET, FILM COATED ORAL
Qty: 90 TABLET | Refills: 0 | Status: SHIPPED | OUTPATIENT
Start: 2018-10-16 | End: 2018-11-26

## 2018-10-16 RX ORDER — TRAZODONE HYDROCHLORIDE 50 MG/1
TABLET, FILM COATED ORAL
Qty: 90 TABLET | Refills: 0 | Status: SHIPPED | OUTPATIENT
Start: 2018-10-16 | End: 2018-11-26

## 2018-10-16 NOTE — TELEPHONE ENCOUNTER
Prescription approved per [facility] Refill Protocol.  Surveys sent to update in November. Blank Granado RN

## 2018-10-26 ENCOUNTER — OFFICE VISIT (OUTPATIENT)
Dept: FAMILY MEDICINE | Facility: CLINIC | Age: 58
End: 2018-10-26
Payer: COMMERCIAL

## 2018-10-26 VITALS
SYSTOLIC BLOOD PRESSURE: 133 MMHG | HEIGHT: 71 IN | HEART RATE: 67 BPM | DIASTOLIC BLOOD PRESSURE: 79 MMHG | OXYGEN SATURATION: 98 % | WEIGHT: 220.6 LBS | BODY MASS INDEX: 30.88 KG/M2 | TEMPERATURE: 96.8 F

## 2018-10-26 DIAGNOSIS — J06.9 VIRAL UPPER RESPIRATORY TRACT INFECTION: Primary | ICD-10-CM

## 2018-10-26 DIAGNOSIS — F41.8 MIXED ANXIETY DEPRESSIVE DISORDER: Chronic | ICD-10-CM

## 2018-10-26 DIAGNOSIS — E78.5 HYPERLIPIDEMIA LDL GOAL <130: Chronic | ICD-10-CM

## 2018-10-26 DIAGNOSIS — J30.9 ALLERGIC RHINITIS, UNSPECIFIED SEASONALITY, UNSPECIFIED TRIGGER: ICD-10-CM

## 2018-10-26 DIAGNOSIS — R07.0 THROAT PAIN: ICD-10-CM

## 2018-10-26 DIAGNOSIS — Z13.220 SCREENING FOR LIPID DISORDERS: ICD-10-CM

## 2018-10-26 LAB
DEPRECATED S PYO AG THROAT QL EIA: NORMAL
SPECIMEN SOURCE: NORMAL

## 2018-10-26 PROCEDURE — 87880 STREP A ASSAY W/OPTIC: CPT | Performed by: FAMILY MEDICINE

## 2018-10-26 PROCEDURE — 99214 OFFICE O/P EST MOD 30 MIN: CPT | Performed by: FAMILY MEDICINE

## 2018-10-26 PROCEDURE — 87081 CULTURE SCREEN ONLY: CPT | Performed by: FAMILY MEDICINE

## 2018-10-26 ASSESSMENT — PATIENT HEALTH QUESTIONNAIRE - PHQ9
SUM OF ALL RESPONSES TO PHQ QUESTIONS 1-9: 2
10. IF YOU CHECKED OFF ANY PROBLEMS, HOW DIFFICULT HAVE THESE PROBLEMS MADE IT FOR YOU TO DO YOUR WORK, TAKE CARE OF THINGS AT HOME, OR GET ALONG WITH OTHER PEOPLE: SOMEWHAT DIFFICULT
5. POOR APPETITE OR OVEREATING: SEVERAL DAYS
SUM OF ALL RESPONSES TO PHQ QUESTIONS 1-9: 3
SUM OF ALL RESPONSES TO PHQ QUESTIONS 1-9: 3

## 2018-10-26 ASSESSMENT — ANXIETY QUESTIONNAIRES
2. NOT BEING ABLE TO STOP OR CONTROL WORRYING: NOT AT ALL
1. FEELING NERVOUS, ANXIOUS, OR ON EDGE: SEVERAL DAYS
6. BECOMING EASILY ANNOYED OR IRRITABLE: NOT AT ALL
6. BECOMING EASILY ANNOYED OR IRRITABLE: NOT AT ALL
GAD7 TOTAL SCORE: 3
GAD7 TOTAL SCORE: 3
2. NOT BEING ABLE TO STOP OR CONTROL WORRYING: NOT AT ALL
7. FEELING AFRAID AS IF SOMETHING AWFUL MIGHT HAPPEN: NOT AT ALL
3. WORRYING TOO MUCH ABOUT DIFFERENT THINGS: NOT AT ALL
5. BEING SO RESTLESS THAT IT IS HARD TO SIT STILL: SEVERAL DAYS
7. FEELING AFRAID AS IF SOMETHING AWFUL MIGHT HAPPEN: NOT AT ALL
7. FEELING AFRAID AS IF SOMETHING AWFUL MIGHT HAPPEN: NOT AT ALL
GAD7 TOTAL SCORE: 3
1. FEELING NERVOUS, ANXIOUS, OR ON EDGE: SEVERAL DAYS
4. TROUBLE RELAXING: SEVERAL DAYS
3. WORRYING TOO MUCH ABOUT DIFFERENT THINGS: NOT AT ALL
GAD7 TOTAL SCORE: 3
5. BEING SO RESTLESS THAT IT IS HARD TO SIT STILL: SEVERAL DAYS

## 2018-10-26 NOTE — LETTER
October 30, 2018      Mata Poe  [address]  [place] [address]        Dear ,    We are writing to inform you of your test results.     Your strep culture came back negative.     Resulted Orders   Strep, Rapid Screen   Result Value Ref Range    Specimen Description Throat     Rapid Strep A Screen       NEGATIVE: No Group A streptococcal antigen detected by immunoassay, await culture report.   Beta strep group A culture   Result Value Ref Range    Specimen Description Throat     Culture Micro No beta hemolytic Streptococcus Group A isolated        If you have any questions or concerns, please call the clinic at the number listed above.       Sincerely,        Collette Cali MD

## 2018-10-26 NOTE — MR AVS SNAPSHOT
After Visit Summary   10/26/2018    Mata Poe    MRN: [id number]           Patient Information     Date Of Birth          1960        Visit Information        Provider Department      10/26/2018 11:00 AM Collette Cali MD [facility]        Today's Diagnoses     Viral upper respiratory tract infection    -  1    Allergic rhinitis, unspecified seasonality, unspecified trigger        Throat pain        Screening for lipid disorders        Mixed anxiety depressive disorder        Hyperlipidemia LDL goal <130          Care Instructions        Try sudafed long acting daily for a couple days    Could also try an oral allergy pill for a week or so    Sometimes, nasal steroid ( flonase) will help open up the nose               Follow-ups after your visit        Future tests that were ordered for you today     Open Future Orders        Priority Expected Expires Ordered    Lipid panel reflex to direct LDL Fasting Routine 9/26/2019 10/26/2019 10/26/2018            Who to contact     Normal or non-critical lab and imaging results will be communicated to you by [employer], letter or phone within 4 business days after the clinic has received the results. If you do not hear from us within 7 days, please contact the clinic through [employer] or phone. If you have a critical or abnormal lab result, we will notify you by phone as soon as possible.  Submit refill requests through [employer] or call your pharmacy and they will forward the refill request to us. Please allow 3 business days for your refill to be completed.          If you need to speak with a  for additional information , please call: [phone]             Additional Information About Your Visit        [employer] Information     [employer] gives you secure access to your electronic health record. If you see a primary care provider, you can also send messages to your care team and make appointments. If you have  "questions, please call your primary care clinic.  If you do not have a primary care provider, please call [phone] and they will assist you.        Care EveryWhere ID     This is your Care EveryWhere ID. This could be used by other organizations to access your [place] medical records  [id number]        Your Vitals Were     Pulse Temperature Height Pulse Oximetry BMI (Body Mass Index)       67 96.8  F (36  C) (Tympanic) 5' 11\" (1.803 m) 98% 30.77 kg/m2        Blood Pressure from Last 3 Encounters:   10/26/18 133/79   08/13/18 125/81   05/22/18 127/80    Weight from Last 3 Encounters:   10/26/18 220 lb 9.6 oz (100.1 kg)   08/13/18 210 lb (95.3 kg)   05/22/18 216 lb (98 kg)              We Performed the Following     Beta strep group A culture     Strep, Rapid Screen        Primary Care Provider Office Phone # Fax #    Tiana Bellojoe Llanes [phone] [address] [place] [address]        Equal Access to Services     [facility]: Hadii aad ku hadasho Soshastaali, waaxda luqadaha, qaybta kaalmada dagoberto, olivia serrano . So [facility] [phone].    ATENCIÓN: Si habla español, tiene a chavez disposición servicios gratuitos de asistencia lingüística. Carolyn al [phone].    We comply with applicable federal civil rights laws and [place] laws. We do not discriminate on the basis of race, color, national origin, age, disability, sex, sexual orientation, or gender identity.            Thank you!     Thank you for choosing [facility]  for your care. Our goal is always to provide you with excellent care. Hearing back from our patients is one way we can continue to improve our services. Please take a few minutes to complete the written survey that you may receive in the mail after your visit with us. Thank you!             Your Updated Medication List - Protect others around you: Learn how to safely use, store and throw away your medicines at " www.disposemymeds.org.          This list is accurate as of 10/26/18 11:55 AM.  Always use your most recent med list.                   Brand Name Dispense Instructions for use Diagnosis    alfuzosin 10 MG 24 hr tablet    UROXATRAL    90 tablet    Take 1 tablet (10 mg) by mouth daily    Benign prostatic hyperplasia with urinary obstruction       ASPIRIN EC PO      Take 81 mg by mouth daily Reported on 5/1/2017        atorvastatin 40 MG tablet    LIPITOR    90 tablet    Take 1 tablet (40 mg) by mouth daily    Hyperlipidemia LDL goal <130       latanoprost 0.005 % ophthalmic solution    XALATAN          LORazepam 1 MG tablet    ATIVAN    15 tablet    Take 0.5 tablets (0.5 mg) by mouth every 8 hours as needed for anxiety    Causalgia of lower limb, left, Mixed anxiety depressive disorder       medical cannabis inhalation (Patient's own supply.  Not a prescription)     0 Information only    (This is NOT a prescription, and does not certify that the patient has a qualifying medical condition for medical cannabis.  The purpose of this order is  to document that the patient reports taking medical cannabis.)    Chronic pain syndrome       order for [facility]      Respironics REMSTAR 60 Series Auto CPAP 7cm H2O, Airfit P10 nasal pillow mask w/medium pillows        PARoxetine 10 MG tablet    PAXIL    90 tablet    TAKE ONE TABLET BY MOUTH EVERY NIGHT AT BEDTIME    Chronic pain syndrome, Mixed anxiety depressive disorder       sildenafil 20 MG tablet    REVATIO     Take 20 mg by mouth as needed        * traZODone 150 MG tablet    DESYREL    30 tablet    Take 1 tablet (150 mg) by mouth nightly as needed for sleep Needs appointment for further refills.    Disturbance in sleep behavior       * traZODone 50 MG tablet    DESYREL    90 tablet    TAKE 1 TABLET BY MOUTH NIGHTLY AS NEEDED FOR SLEEP    Disturbance in sleep behavior       * Notice:  This list has 2 medication(s) that are the same as other medications prescribed for you. Read  the directions carefully, and ask your doctor or other care provider to review them with you.

## 2018-10-26 NOTE — PATIENT INSTRUCTIONS
Try sudafed long acting daily for a couple days    Could also try an oral allergy pill for a week or so    Sometimes, nasal steroid ( flonase) will help open up the nose

## 2018-10-26 NOTE — LETTER
My Depression Action Plan  Name: Mata Poe   Date of Birth 1960  Date: 10/26/2018    My doctor: Tiana Llanes   My clinic: [address] [id number] [phone]          GREEN    ZONE   Good Control    What it looks like:     Things are going generally well. You have normal up s and down s. You may even feel depressed from time to time, but bad moods usually last less than a day.   What you need to do:  1. Continue to care for yourself (see self care plan)  2. Check your depression survival kit and update it as needed  3. Follow your physician s recommendations including any medication.  4. Do not stop taking medication unless you consult with your physician first.           YELLOW         ZONE Getting Worse    What it looks like:     Depression is starting to interfere with your life.     It may be hard to get out of bed; you may be starting to isolate yourself from others.    Symptoms of depression are starting to last most all day and this has happened for several days.     You may have suicidal thoughts but they are not constant.   What you need to do:     1. Call your care team, your response to treatment will improve if you keep your care team informed of your progress. Yellow periods are signs an adjustment may need to be made.     2. Continue your self-care, even if you have to fake it!    3. Talk to someone in your support network    4. Open up your depression survival kit           RED    ZONE Medical Alert - Get Help    What it looks like:     Depression is seriously interfering with your life.     You may experience these or other symptoms: You can t get out of bed most days, can t work or engage in other necessary activities, you have trouble taking care of basic hygiene, or basic responsibilities, thoughts of suicide or death that will not go away, self-injurious behavior.     What you need to do:  1. Call your care team and  request a same-day appointment. If they are not available (weekends or after hours) call your local crisis line, emergency room or 911.            Depression Self Care Plan / Survival Kit    Self-Care for Depression  Here s the deal. Your body and mind are really not as separate as most people think.  What you do and think affects how you feel and how you feel influences what you do and think. This means if you do things that people who feel good do, it will help you feel better.  Sometimes this is all it takes.  There is also a place for medication and therapy depending on how severe your depression is, so be sure to consult with your medical provider and/ or Behavioral Health Consultant if your symptoms are worsening or not improving.     In order to better manage my stress, I will:    Exercise  Get some form of exercise, every day. This will help reduce pain and release endorphins, the  feel good  chemicals in your brain. This is almost as good as taking antidepressants!  This is not the same as joining a gym and then never going! (they count on that by the way ) It can be as simple as just going for a walk or doing some gardening, anything that will get you moving.      Hygiene   Maintain good hygiene (Get out of bed in the morning, Make your bed, Brush your teeth, Take a shower, and Get dressed like you were going to work, even if you are unemployed).  If your clothes don't fit try to get ones that do.    Diet  I will strive to eat foods that are good for me, drink plenty of water, and avoid excessive sugar, caffeine, alcohol, and other mood-altering substances.  Some foods that are helpful in depression are: complex carbohydrates, B vitamins, flaxseed, fish or fish oil, fresh fruits and vegetables.    Psychotherapy  I agree to participate in Individual Therapy (if recommended).    Medication  If prescribed medications, I agree to take them.  Missing doses can result in serious side effects.  I understand that  drinking alcohol, or other illicit drug use, may cause potential side effects.  I will not stop my medication abruptly without first discussing it with my provider.    Staying Connected With Others  I will stay in touch with my friends, family members, and my primary care provider/team.    Use your imagination  Be creative.  We all have a creative side; it doesn t matter if it s oil painting, sand castles, or mud pies! This will also kick up the endorphins.    Witness Beauty  (AKA stop and smell the roses) Take a look outside, even in mid-winter. Notice colors, textures. Watch the squirrels and birds.     Service to others  Be of service to others.  There is always someone else in need.  By helping others we can  get out of ourselves  and remember the really important things.  This also provides opportunities for practicing all the other parts of the program.    Humor  Laugh and be silly!  Adjust your TV habits for less news and crime-drama and more comedy.    Control your stress  Try breathing deep, massage therapy, biofeedback, and meditation. Find time to relax each day.     My support system    Clinic Contact:  Phone number:    Contact 1:  Phone number:    Contact 2:  Phone number:    Muslim/:  Phone number:    Therapist:  Phone number:    Local crisis center:    Phone number:    Other community support:  Phone number:

## 2018-10-26 NOTE — PROGRESS NOTES
"  SUBJECTIVE:   Mata Poe is a 58 year old male who presents to clinic today for the following health issues:      ENT Symptoms             Symptoms: cc Present Absent Comment   Fever/Chills   x    Fatigue  x     Muscle Aches  x     Eye Irritation   x    Sneezing  x     Nasal Jeronimo/Drg   x    Sinus Pressure/Pain  x     Loss of smell  x     Dental pain   x    Sore Throat  x     Swollen Glands  x     Ear Pain/Fullness  x     Cough  x     Wheeze   x    Chest Pain   x    Shortness of breath   x    Rash   x    Other   x      Symptom duration:  past 5 days    Symptom severity:  moderate - Throat severe    Treatments tried:  tylenol - last dosage 6 am    Contacts:        Was cleaning a garage before these symptoms started  - dust and mold     Symptoms :  Throat is sore  Nose is plugged  + cough - \"doesn't feel like a cold\"   No shortness of breath     Temp of 101 two days ago - no temp since then    Doesn't usually have allergies  No asthma  Non smoker     Review of systems:  No headache  No eye/tooth pain  No n/v   No discontinue  No abd pain     Problem list and histories reviewed & adjusted, as indicated.  Additional history: as documented    Patient Active Problem List   Diagnosis     Chronic pain syndrome     Allergic rhinitis     Abnormal CT scan, chest     24 hr info given     Hyperlipidemia LDL goal <130     Elevated fasting glucose     Obstructive Sleep Apnea severe (AHI=70)     Mixed anxiety depressive disorder     Benign prostatic hyperplasia with urinary obstruction     Polyp of colon     Insomnia due to medical condition     Current Outpatient Prescriptions   Medication     alfuzosin (UROXATRAL) 10 MG 24 hr tablet     atorvastatin (LIPITOR) 40 MG tablet     latanoprost (XALATAN) 0.005 % ophthalmic solution     LORazepam (ATIVAN) 1 MG tablet     PARoxetine (PAXIL) 10 MG tablet     traZODone (DESYREL) 50 MG tablet     ASPIRIN EC PO     medical cannabis inhalation (Patient's own supply.  Not a " "prescription)     ORDER FOR DME     sildenafil (REVATIO) 20 MG tablet     traZODone (DESYREL) 150 MG tablet     No current facility-administered medications for this visit.         Allergies   Allergen Reactions     No Known Allergies        /79 (BP Location: Right arm, Patient Position: Sitting, Cuff Size: Adult Large)  Pulse 67  Temp 96.8  F (36  C) (Tympanic)  Ht 5' 11\" (1.803 m)  Wt 220 lb 9.6 oz (100.1 kg)  SpO2 98%  BMI 30.77 kg/m2  GENERAL - Pt is alert and oriented in no acute distress.  Affect is appropriate. Good eye contact.  HEET - Head is normocephalic, atraumatic.    PERRLA,EEMI. Conjunctiva are free of icterus or erythema.    TMs bilaterally normal.  Oropharynx - mildly erythematous, no masses and lesions, no tonsillar exudate or petechiae.    NECK - Neck is supple w/o LA or thyromegaly  RESPIRATORY - Clear to auscultation bilaterally.  No wheezing noted  CV - RRR, no murmurs, rubs, gallops.            Assessment/Plan -  (J06.9) Viral upper respiratory tract infection  (primary encounter diagnosis)  Comment: Discussed likely viral etiology of his sore throat and supportive cares. Recommended humidifier in the bedroom.  Recommend pushing fluids and tylenol for discomfort prn.  Recommended that he try to get good sleep.  Can use sudafed as needed for decongestion.  Discussed importance of good hand hygiene.  Understands that he needs to return to clinic if symptoms persist, change, or worsen.  The patient indicates understanding of these issues and agrees with the plan.       (J30.9) Allergic rhinitis, unspecified seasonality, unspecified trigger  Comment: symptoms may also be due to allergic reaction to whatever was in the garage. I recommended that he try daily antihistamine for a week or so and daily sudafed for a few days. Could try a few weeks of flonase to calm everything down as well. The patient indicates understanding of these issues and agrees with the plan.   Plan:     (R07.0) " Throat pain  Comment:   Plan: Strep, Rapid Screen, Beta strep group A culture            (Z13.220) Screening for lipid disorders  Comment:   Plan: Lipid panel reflex to direct LDL Fasting            (F41.8) Mixed anxiety depressive disorder  Comment:   Plan:     (E78.5) Hyperlipidemia LDL goal <130  Comment:   Plan:     SARA Cali MD

## 2018-10-27 LAB
BACTERIA SPEC CULT: NORMAL
SPECIMEN SOURCE: NORMAL

## 2018-10-27 ASSESSMENT — ANXIETY QUESTIONNAIRES
GAD7 TOTAL SCORE: 3
GAD7 TOTAL SCORE: 3

## 2018-10-27 ASSESSMENT — PATIENT HEALTH QUESTIONNAIRE - PHQ9: SUM OF ALL RESPONSES TO PHQ QUESTIONS 1-9: 3

## 2018-11-09 ENCOUNTER — MYC MEDICAL ADVICE (OUTPATIENT)
Dept: FAMILY MEDICINE | Facility: CLINIC | Age: 58
End: 2018-11-09

## 2018-11-09 DIAGNOSIS — Z12.5 SCREENING FOR PROSTATE CANCER: Primary | ICD-10-CM

## 2018-11-12 NOTE — TELEPHONE ENCOUNTER
Patient has orders for Lipids, last PSA was normal 3/24/17.  Dr Ordonez ordered last PSA, not sure you want it ordered at this time.  Marilee ALEJANDRA/PLACIDO

## 2018-11-13 ENCOUNTER — MYC MEDICAL ADVICE (OUTPATIENT)
Dept: FAMILY MEDICINE | Facility: CLINIC | Age: 58
End: 2018-11-13

## 2018-11-13 DIAGNOSIS — E78.5 HYPERLIPIDEMIA LDL GOAL <130: Chronic | ICD-10-CM

## 2018-11-13 DIAGNOSIS — R73.09 ABNORMAL GLUCOSE: Primary | ICD-10-CM

## 2018-11-13 DIAGNOSIS — R73.09 ABNORMAL GLUCOSE: ICD-10-CM

## 2018-11-13 DIAGNOSIS — Z12.5 SCREENING FOR PROSTATE CANCER: ICD-10-CM

## 2018-11-13 LAB
ALBUMIN SERPL-MCNC: 3.5 G/DL (ref 3.4–5)
ALP SERPL-CCNC: 88 U/L (ref 40–150)
ALT SERPL W P-5'-P-CCNC: 23 U/L (ref 0–70)
ANION GAP SERPL CALCULATED.3IONS-SCNC: 3 MMOL/L (ref 3–14)
AST SERPL W P-5'-P-CCNC: 28 U/L (ref 0–45)
BILIRUB SERPL-MCNC: 0.5 MG/DL (ref 0.2–1.3)
BUN SERPL-MCNC: 16 MG/DL (ref 7–30)
CALCIUM SERPL-MCNC: 8.4 MG/DL (ref 8.5–10.1)
CHLORIDE SERPL-SCNC: 105 MMOL/L (ref 94–109)
CO2 SERPL-SCNC: 31 MMOL/L (ref 20–32)
CREAT SERPL-MCNC: 0.82 MG/DL (ref 0.66–1.25)
GFR SERPL CREATININE-BSD FRML MDRD: >90 ML/MIN/1.7M2
GLUCOSE SERPL-MCNC: 108 MG/DL (ref 70–99)
HBA1C MFR BLD: 5.6 % (ref 0–5.6)
POTASSIUM SERPL-SCNC: 4.3 MMOL/L (ref 3.4–5.3)
PROT SERPL-MCNC: 7.6 G/DL (ref 6.8–8.8)
PSA SERPL-ACNC: 3.27 UG/L (ref 0–4)
SODIUM SERPL-SCNC: 139 MMOL/L (ref 133–144)

## 2018-11-13 PROCEDURE — 36415 COLL VENOUS BLD VENIPUNCTURE: CPT | Performed by: FAMILY MEDICINE

## 2018-11-13 PROCEDURE — G0103 PSA SCREENING: HCPCS | Performed by: FAMILY MEDICINE

## 2018-11-13 PROCEDURE — 83036 HEMOGLOBIN GLYCOSYLATED A1C: CPT | Performed by: FAMILY MEDICINE

## 2018-11-13 PROCEDURE — 80053 COMPREHEN METABOLIC PANEL: CPT | Performed by: FAMILY MEDICINE

## 2018-11-13 NOTE — TELEPHONE ENCOUNTER
Last Glucose was in 2014.  Glucose=113.  Unsure if you want fasting glucose added to labs? Or if you want A1C?  Marilee ALEJANDRA/PLACIDO

## 2018-11-26 DIAGNOSIS — F41.8 MIXED ANXIETY DEPRESSIVE DISORDER: Chronic | ICD-10-CM

## 2018-11-26 DIAGNOSIS — G47.9 DISTURBANCE IN SLEEP BEHAVIOR: ICD-10-CM

## 2018-11-26 DIAGNOSIS — G57.72 CAUSALGIA OF LOWER LIMB, LEFT: ICD-10-CM

## 2018-11-26 DIAGNOSIS — G89.4 CHRONIC PAIN SYNDROME: Chronic | ICD-10-CM

## 2018-11-26 NOTE — TELEPHONE ENCOUNTER
"Lorazepam 1mg      Last Written Prescription Date:  08/28/2018 #15 x 0  Last filled - not provided  Last Office Visit: 10/15/2018 ANNY Llanes  Future Office visit:   None    Routing refill request to provider for review/approval because:  Drug not on the [facility], [facility] or [facility] refill protocol or controlled substance      Requested Prescriptions   Pending Prescriptions Disp Refills     PARoxetine (PAXIL) 10 MG tablet [Pharmacy Med Name: PAROXETINE HCL 10MG TABS] 90 tablet 0    Last Written Prescription Date:  10/16/2018 #90 x 0  Last filled 10/17/2018  Last office visit: 10/15/2018 ANNY Llanes   Future Office Visit: None     Sig: TAKE ONE TABLET BY MOUTH EVERY NIGHT AT BEDTIME    SSRIs Protocol Passed    11/26/2018 11:16 AM       Passed - PHQ-9 score less than 5 in past 6 months    Please review last PHQ-9 score.  PHQ-9 SCORE 5/9/2018 10/26/2018 10/26/2018   Total Score MyChart 4 (Minimal depression) - 3 (Minimal depression)   Total Score 4 2 3        JACOB-7 SCORE 5/9/2018 10/26/2018 10/26/2018   Total Score 3 (minimal anxiety) - 3 (minimal anxiety)   Total Score 3 3 3                Passed - Patient is age 18 or older       Passed - Recent (6 mo) or future (30 days) visit within the authorizing provider's specialty    Patient had office visit in the last 6 months or has a visit in the next 30 days with authorizing provider or within the authorizing provider's specialty.  See \"Patient Info\" tab in inbasket, or \"Choose Columns\" in Meds & Orders section of the refill encounter.            traZODone (DESYREL) 50 MG tablet [Pharmacy Med Name: TRAZODONE HCL 50MG TABS] 90 tablet 0    Last Written Prescription Date:  10/16/2018 #90 x 0  Last filled 11/03/2018  Last office visit: 10/15/2018 ANNY Llanes   Future Office Visit:  None    Sig: TAKE 1 TABLET BY MOUTH NIGHTLY AS NEEDED FOR SLEEP    Serotonin Modulators Passed    11/26/2018 11:16 AM       Passed - Recent (12 mo) or future (30 days) visit within the authorizing provider's " "specialty    Patient had office visit in the last 12 months or has a visit in the next 30 days with authorizing provider or within the authorizing provider's specialty.  See \"Patient Info\" tab in inbasket, or \"Choose Columns\" in Meds & Orders section of the refill encounter.             Passed - Patient is age 18 or older       "

## 2018-11-27 RX ORDER — TRAZODONE HYDROCHLORIDE 50 MG/1
TABLET, FILM COATED ORAL
Qty: 90 TABLET | Refills: 0 | Status: SHIPPED | OUTPATIENT
Start: 2018-11-27 | End: 2019-06-13

## 2018-11-27 RX ORDER — PAROXETINE 10 MG/1
TABLET, FILM COATED ORAL
Qty: 90 TABLET | Refills: 0 | Status: SHIPPED | OUTPATIENT
Start: 2018-11-27 | End: 2019-06-13

## 2018-11-27 RX ORDER — LORAZEPAM 1 MG/1
0.5 TABLET ORAL EVERY 8 HOURS PRN
Qty: 15 TABLET | Refills: 0 | Status: SHIPPED | OUTPATIENT
Start: 2018-11-27 | End: 2019-01-14

## 2019-01-14 DIAGNOSIS — G57.72 CAUSALGIA OF LOWER LIMB, LEFT: ICD-10-CM

## 2019-01-14 DIAGNOSIS — F41.8 MIXED ANXIETY DEPRESSIVE DISORDER: Chronic | ICD-10-CM

## 2019-01-14 RX ORDER — LORAZEPAM 1 MG/1
0.5 TABLET ORAL EVERY 8 HOURS PRN
Qty: 15 TABLET | Refills: 0 | Status: SHIPPED | OUTPATIENT
Start: 2019-01-14 | End: 2019-04-09

## 2019-01-14 NOTE — TELEPHONE ENCOUNTER
Lorazepam 1mg  Last FIlled Date 11/28  Qty dispensed 15    Thank you!  Mary Chaidez [facility]

## 2019-01-15 NOTE — TELEPHONE ENCOUNTER
Pt given Dr Stern message. Pt moved and will probably be establishing care at [facility]. Script faxed to pharmacy.  Jossy Aguillon  CSS

## 2019-01-15 NOTE — TELEPHONE ENCOUNTER
Med filled once.  Please let pt know he needs to schedule to see me for additional refills.  I have not seen him for >1 year.    Tiana Llanes

## 2019-04-09 DIAGNOSIS — F41.8 MIXED ANXIETY DEPRESSIVE DISORDER: Chronic | ICD-10-CM

## 2019-04-09 DIAGNOSIS — G89.4 CHRONIC PAIN SYNDROME: Chronic | ICD-10-CM

## 2019-04-09 DIAGNOSIS — G57.72 CAUSALGIA OF LOWER LIMB, LEFT: ICD-10-CM

## 2019-04-09 RX ORDER — LORAZEPAM 1 MG/1
0.5 TABLET ORAL EVERY 8 HOURS PRN
Qty: 15 TABLET | Refills: 0 | Status: SHIPPED | OUTPATIENT
Start: 2019-04-09 | End: 2019-05-30

## 2019-04-09 NOTE — TELEPHONE ENCOUNTER
Lorazepam 1mg  Last FIlled Date 1/16  Qty dispensed 15    Thank you!  Mary Chaiedz [facility]

## 2019-04-10 RX ORDER — PAROXETINE 10 MG/1
TABLET, FILM COATED ORAL
Qty: 90 TABLET | Refills: 0 | Status: SHIPPED | OUTPATIENT
Start: 2019-04-10 | End: 2019-06-27

## 2019-04-10 NOTE — TELEPHONE ENCOUNTER
Spoke with patient , reviewed chart   Advised needs appt for any further refills,  He stated he has moved to [facility] and wants to establish care at [facility]  Closer to him   Discussed  Needs to call and get appt ASAP, to establish care  So future refills can be addressed  Pt agreed   HIRA [facility]son  Clinic  RN/Sadiq Clinton

## 2019-04-10 NOTE — TELEPHONE ENCOUNTER
"Requested Prescriptions   Pending Prescriptions Disp Refills     PARoxetine (PAXIL) 10 MG tablet [Pharmacy Med Name: PAROXETINE HCL 10MG TABS]  Last Written Prescription Date:  11/27/18  Last Fill Quantity: 90,  # refills: 0   Last office visit: 10/26/2018 with prescribing provider:  javed   Future Office Visit:     90 tablet 0     Sig: TAKE ONE TABLET BY MOUTH EVERY NIGHT AT BEDTIME       SSRIs Protocol Passed - 4/9/2019 10:54 AM        Passed - PHQ-9 score less than 5 in past 6 months     Please review last PHQ-9 score.           Passed - Medication is active on med list        Passed - Patient is age 18 or older        Passed - Recent (6 mo) or future (30 days) visit within the authorizing provider's specialty     Patient had office visit in the last 6 months or has a visit in the next 30 days with authorizing provider or within the authorizing provider's specialty.  See \"Patient Info\" tab in inbasket, or \"Choose Columns\" in Meds & Orders section of the refill encounter.              "

## 2019-04-10 NOTE — TELEPHONE ENCOUNTER
ML for pt to callback needs appt with Mario Alberto if she is PCP, or is he seeing Karely now?  Await call back   PHQ-9 SCORE 5/9/2018 10/26/2018 10/26/2018   PHQ-9 Total Score MyChart 4 (Minimal depression) - 3 (Minimal depression)   PHQ-9 Total Score 4 2 3       HIRA Matson  Clinic  RN/Sadiq Clinton

## 2019-05-10 DIAGNOSIS — G47.9 DISTURBANCE IN SLEEP BEHAVIOR: ICD-10-CM

## 2019-05-10 DIAGNOSIS — F41.8 MIXED ANXIETY DEPRESSIVE DISORDER: Chronic | ICD-10-CM

## 2019-05-10 DIAGNOSIS — G89.4 CHRONIC PAIN SYNDROME: Chronic | ICD-10-CM

## 2019-05-10 DIAGNOSIS — E78.5 HYPERLIPIDEMIA LDL GOAL <130: Chronic | ICD-10-CM

## 2019-05-10 RX ORDER — PAROXETINE 10 MG/1
TABLET, FILM COATED ORAL
Qty: 90 TABLET | Refills: 0 | Status: CANCELLED | OUTPATIENT
Start: 2019-05-10

## 2019-05-10 RX ORDER — TRAZODONE HYDROCHLORIDE 50 MG/1
50 TABLET, FILM COATED ORAL
Qty: 90 TABLET | Refills: 0 | Status: CANCELLED | OUTPATIENT
Start: 2019-05-10

## 2019-05-10 RX ORDER — ATORVASTATIN CALCIUM 40 MG/1
40 TABLET, FILM COATED ORAL DAILY
Qty: 90 TABLET | Refills: 3 | Status: CANCELLED | OUTPATIENT
Start: 2019-05-10

## 2019-05-10 NOTE — TELEPHONE ENCOUNTER
"Requested Prescriptions   Pending Prescriptions Disp Refills     PARoxetine (PAXIL) 10 MG tablet  Last Written Prescription Date:  04/10/2019 #90 x 0  Last filled 05/09/2019  Last office visit: 12/26/2017 ANNY Llanes     Future Office Visit:   Next 5 appointments (look out 90 days)    Jun 13, 2019 10:20 AM CDT  [facility] Visit with Parrish Sears MD  [facility] [address] [facility] [address]  [phone]       90 tablet 0       SSRIs Protocol Failed - 5/10/2019 11:16 AM  PHQ-9 SCORE 5/9/2018 10/26/2018 10/26/2018   PHQ-9 Total Score MyChart 4 (Minimal depression) - 3 (Minimal depression)   PHQ-9 Total Score 4 2 3       JACOB-7 SCORE 5/9/2018 10/26/2018 10/26/2018   Total Score 3 (minimal anxiety) - 3 (minimal anxiety)   Total Score 3 3 3               Failed - PHQ-9 score less than 5 in past 6 months     Please review last PHQ-9 score.           Failed - Recent (6 mo) or future (30 days) visit within the authorizing provider's specialty     Patient had office visit in the last 6 months or has a visit in the next 30 days with authorizing provider or within the authorizing provider's specialty.  See \"Patient Info\" tab in inbasket, or \"Choose Columns\" in Meds & Orders section of the refill encounter.            Passed - Medication is active on med list        Passed - Patient is age 18 or older        traZODone (DESYREL) 50 MG tablet  Last Written Prescription Date:  11/27/2018 #90 x 0  Last filled 05/09/2019  Last office visit: 12/26/2017 ANNY Llanes     Future Office Visit:   Next 5 appointments (look out 90 days)    Jun 13, 2019 10:20 AM CDT  Office Visit with Parrish Sears MD  [facility] [address] [address] [facility] [address]  [phone]        90 tablet 0     Sig: Take 1 tablet (50 mg) by mouth nightly as needed       Serotonin Modulators Failed - 5/10/2019 11:16 AM        " "Failed - Recent (12 mo) or future (30 days) visit within the authorizing provider's specialty     Patient had office visit in the last 12 months or has a visit in the next 30 days with authorizing provider or within the authorizing provider's specialty.  See \"Patient Info\" tab in inbasket, or \"Choose Columns\" in Meds & Orders section of the refill encounter.              Passed - Medication is active on med list        Passed - Patient is age 18 or older        atorvastatin (LIPITOR) 40 MG tablet  Last Written Prescription Date:  05/09/2018 #90 x 3  Last filled 05/09/2019  Last office visit: 12/26/2017 [facility] Mario Alberto     Future Office Visit:   Next 5 appointments (look out 90 days)    Jun 13, 2019 10:20 AM CDT  Office Visit with Parrish Sears MD  [facility] ([facility]) [address] [facility] [address]  [phone]          90 tablet 3     Sig: Take 1 tablet (40 mg) by mouth daily       Statins Protocol Failed - 5/10/2019 11:16 AM        Failed - LDL on file in past 12 months     Recent Labs   Lab Test 03/24/17  0925   LDL Cannot estimate LDL when triglyceride exceeds 400 mg/dL  73             Failed - Recent (12 mo) or future (30 days) visit within the authorizing provider's specialty     Patient had office visit in the last 12 months or has a visit in the next 30 days with authorizing provider or within the authorizing provider's specialty.  See \"Patient Info\" tab in inbasket, or \"Choose Columns\" in Meds & Orders section of the refill encounter.              Passed - No abnormal creatine kinase in past 12 months     No lab results found.             Passed - Medication is active on med list        Passed - Patient is age 18 or older          "

## 2019-05-10 NOTE — TELEPHONE ENCOUNTER
These were sent to [facility] recently and patient has appointment with Dr. Sears in [facility] as he lives in [place] and wanted somewhere closer to home.    Jossy Barnes RN

## 2019-05-28 DIAGNOSIS — G57.72 CAUSALGIA OF LOWER LIMB, LEFT: ICD-10-CM

## 2019-05-28 DIAGNOSIS — F41.8 MIXED ANXIETY DEPRESSIVE DISORDER: Chronic | ICD-10-CM

## 2019-05-28 NOTE — TELEPHONE ENCOUNTER
Lorazepam 1mg  Last FIlled Date 4/11  Qty dispensed 15    Thank you!  Mary Chaidez [facility]

## 2019-05-30 RX ORDER — LORAZEPAM 1 MG/1
0.5 TABLET ORAL EVERY 8 HOURS PRN
Qty: 15 TABLET | Refills: 0 | Status: SHIPPED | OUTPATIENT
Start: 2019-05-30 | End: 2019-06-13

## 2019-06-13 ENCOUNTER — OFFICE VISIT (OUTPATIENT)
Dept: FAMILY MEDICINE | Facility: CLINIC | Age: 59
End: 2019-06-13
Payer: COMMERCIAL

## 2019-06-13 VITALS
DIASTOLIC BLOOD PRESSURE: 72 MMHG | HEART RATE: 72 BPM | OXYGEN SATURATION: 97 % | RESPIRATION RATE: 16 BRPM | BODY MASS INDEX: 29.42 KG/M2 | SYSTOLIC BLOOD PRESSURE: 120 MMHG | HEIGHT: 72 IN | TEMPERATURE: 97.6 F | WEIGHT: 217.2 LBS

## 2019-06-13 DIAGNOSIS — G89.4 CHRONIC PAIN SYNDROME: Chronic | ICD-10-CM

## 2019-06-13 DIAGNOSIS — F41.8 MIXED ANXIETY DEPRESSIVE DISORDER: Chronic | ICD-10-CM

## 2019-06-13 DIAGNOSIS — N13.8 BENIGN PROSTATIC HYPERPLASIA WITH URINARY OBSTRUCTION: Chronic | ICD-10-CM

## 2019-06-13 DIAGNOSIS — G57.72 CAUSALGIA OF LOWER LIMB, LEFT: ICD-10-CM

## 2019-06-13 DIAGNOSIS — G47.9 DISTURBANCE IN SLEEP BEHAVIOR: ICD-10-CM

## 2019-06-13 DIAGNOSIS — E78.5 HYPERLIPIDEMIA LDL GOAL <130: Chronic | ICD-10-CM

## 2019-06-13 DIAGNOSIS — N40.1 BENIGN PROSTATIC HYPERPLASIA WITH URINARY OBSTRUCTION: Chronic | ICD-10-CM

## 2019-06-13 LAB
ALBUMIN SERPL-MCNC: 3.6 G/DL (ref 3.4–5)
ALP SERPL-CCNC: 88 U/L (ref 40–150)
ALT SERPL W P-5'-P-CCNC: 22 U/L (ref 0–70)
ANION GAP SERPL CALCULATED.3IONS-SCNC: 4 MMOL/L (ref 3–14)
AST SERPL W P-5'-P-CCNC: 23 U/L (ref 0–45)
BILIRUB SERPL-MCNC: 1.2 MG/DL (ref 0.2–1.3)
BUN SERPL-MCNC: 16 MG/DL (ref 7–30)
CALCIUM SERPL-MCNC: 9 MG/DL (ref 8.5–10.1)
CHLORIDE SERPL-SCNC: 106 MMOL/L (ref 94–109)
CHOLEST SERPL-MCNC: 144 MG/DL
CO2 SERPL-SCNC: 30 MMOL/L (ref 20–32)
CREAT SERPL-MCNC: 0.84 MG/DL (ref 0.66–1.25)
GFR SERPL CREATININE-BSD FRML MDRD: >90 ML/MIN/{1.73_M2}
GLUCOSE SERPL-MCNC: 107 MG/DL (ref 70–99)
HDLC SERPL-MCNC: 49 MG/DL
LDLC SERPL CALC-MCNC: 59 MG/DL
NONHDLC SERPL-MCNC: 95 MG/DL
POTASSIUM SERPL-SCNC: 4 MMOL/L (ref 3.4–5.3)
PROT SERPL-MCNC: 7.5 G/DL (ref 6.8–8.8)
SODIUM SERPL-SCNC: 140 MMOL/L (ref 133–144)
TRIGL SERPL-MCNC: 182 MG/DL

## 2019-06-13 PROCEDURE — 80061 LIPID PANEL: CPT | Performed by: FAMILY MEDICINE

## 2019-06-13 PROCEDURE — 99214 OFFICE O/P EST MOD 30 MIN: CPT | Performed by: FAMILY MEDICINE

## 2019-06-13 PROCEDURE — 36415 COLL VENOUS BLD VENIPUNCTURE: CPT | Performed by: FAMILY MEDICINE

## 2019-06-13 PROCEDURE — 80053 COMPREHEN METABOLIC PANEL: CPT | Performed by: FAMILY MEDICINE

## 2019-06-13 RX ORDER — TRAZODONE HYDROCHLORIDE 50 MG/1
50 TABLET, FILM COATED ORAL
Qty: 90 TABLET | Refills: 3 | Status: SHIPPED | OUTPATIENT
Start: 2019-06-13 | End: 2020-07-24

## 2019-06-13 RX ORDER — ATORVASTATIN CALCIUM 40 MG/1
40 TABLET, FILM COATED ORAL DAILY
Qty: 90 TABLET | Refills: 3 | Status: SHIPPED | OUTPATIENT
Start: 2019-06-13 | End: 2020-07-24

## 2019-06-13 RX ORDER — LORAZEPAM 1 MG/1
0.5 TABLET ORAL EVERY 8 HOURS PRN
Qty: 15 TABLET | Refills: 0 | Status: SHIPPED | OUTPATIENT
Start: 2019-06-13 | End: 2019-08-09

## 2019-06-13 RX ORDER — ALFUZOSIN HYDROCHLORIDE 10 MG/1
10 TABLET, EXTENDED RELEASE ORAL DAILY
Qty: 90 TABLET | Refills: 3 | Status: SHIPPED | OUTPATIENT
Start: 2019-06-13 | End: 2022-05-04

## 2019-06-13 RX ORDER — PAROXETINE 10 MG/1
10 TABLET, FILM COATED ORAL EVERY MORNING
Qty: 90 TABLET | Refills: 3 | Status: SHIPPED | OUTPATIENT
Start: 2019-06-13 | End: 2020-07-24

## 2019-06-13 ASSESSMENT — PAIN SCALES - GENERAL: PAINLEVEL: EXTREME PAIN (9)

## 2019-06-13 ASSESSMENT — MIFFLIN-ST. JEOR: SCORE: 1829.24

## 2019-06-13 ASSESSMENT — PATIENT HEALTH QUESTIONNAIRE - PHQ9: SUM OF ALL RESPONSES TO PHQ QUESTIONS 1-9: 5
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
## 2019-06-13 NOTE — PATIENT INSTRUCTIONS
Our Clinic hours are:  Mondays    7:20 am - 7 pm  Tues -  Fri  7:20 am - 5 pm    Clinic Phone: [phone]    The clinic lab opens at 7:30 am Mon - Fri and appointments are required.    [facility]. [phone]  Monday  8 am - 7pm  Tues - Fri 8 am - 5:30 pm
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
## 2019-06-14 NOTE — RESULT ENCOUNTER NOTE
Notified via [employer]: Blood sugar is above optimum but not in diabetic range.  Limiting dietary sugar and carbohydrates and getting regular exercise can help improve this.  Monitor yearly. Cholesterol mildly elevated.  Not abnormal enough at this point to warrant medication change but I would recommend: increasing daily exercise, increasing dietary fiber and reducing carbohydrates (bread, pasta, sugar, alcohol etc) and adding Omega-3s (fish oil or flax seed) 2000mg daily of DHA+EPA to help lower triglycerides. Otherwise labs look good.

## 2019-06-27 ENCOUNTER — OFFICE VISIT (OUTPATIENT)
Dept: FAMILY MEDICINE | Facility: CLINIC | Age: 59
End: 2019-06-27
Payer: COMMERCIAL

## 2019-06-27 VITALS
HEART RATE: 64 BPM | OXYGEN SATURATION: 96 % | WEIGHT: 219.2 LBS | TEMPERATURE: 97 F | SYSTOLIC BLOOD PRESSURE: 122 MMHG | RESPIRATION RATE: 16 BRPM | DIASTOLIC BLOOD PRESSURE: 78 MMHG | HEIGHT: 72 IN | BODY MASS INDEX: 29.69 KG/M2

## 2019-06-27 DIAGNOSIS — H26.9 CATARACT OF BOTH EYES, UNSPECIFIED CATARACT TYPE: ICD-10-CM

## 2019-06-27 DIAGNOSIS — G47.33 OSA (OBSTRUCTIVE SLEEP APNEA): Chronic | ICD-10-CM

## 2019-06-27 DIAGNOSIS — Z01.818 PREOP GENERAL PHYSICAL EXAM: Primary | ICD-10-CM

## 2019-06-27 PROCEDURE — 99215 OFFICE O/P EST HI 40 MIN: CPT | Performed by: FAMILY MEDICINE

## 2019-06-27 ASSESSMENT — MIFFLIN-ST. JEOR: SCORE: 1838.31

## 2019-08-08 ENCOUNTER — MYC MEDICAL ADVICE (OUTPATIENT)
Dept: FAMILY MEDICINE | Facility: CLINIC | Age: 59
End: 2019-08-08

## 2019-08-08 DIAGNOSIS — G90.522 COMPLEX REGIONAL PAIN SYNDROME TYPE 1 OF LEFT LOWER EXTREMITY: Primary | ICD-10-CM

## 2019-08-08 DIAGNOSIS — F41.8 MIXED ANXIETY DEPRESSIVE DISORDER: Chronic | ICD-10-CM

## 2019-08-08 DIAGNOSIS — G57.72 CAUSALGIA OF LOWER LIMB, LEFT: ICD-10-CM

## 2019-08-08 NOTE — TELEPHONE ENCOUNTER
Lorazepam 1mg      Last Written Prescription Date:  06/13/2019 #15 x 0  Last filled - not provided  Last Office Visit: 12/26/2017 ANNY Llanes  Future Office visit:   None    Routing refill request to provider for review/approval because:  Drug not on the [facility], [facility] or [facility] refill protocol or controlled substance

## 2019-08-08 NOTE — TELEPHONE ENCOUNTER
Patient missed his annual appt with pain clinic.  Needs new referral - ready.    Routing to provider.  Kristy MOORE RN

## 2019-08-09 ENCOUNTER — TELEPHONE (OUTPATIENT)
Dept: PALLIATIVE MEDICINE | Facility: CLINIC | Age: 59
End: 2019-08-09

## 2019-08-09 RX ORDER — LORAZEPAM 1 MG/1
0.5 TABLET ORAL EVERY 8 HOURS PRN
Qty: 15 TABLET | Refills: 0 | Status: SHIPPED | OUTPATIENT
Start: 2019-08-09 | End: 2019-11-06

## 2019-08-09 NOTE — LETTER
August 9, 2019    Mata Poe  [address]  [place] [address]    Dear Mata            Welcome to the [facility].  We are located on the 2nd floor ([address]) of the [facility], located at [address] [place] [address] [place] [address].    Your appointment at the [facility] has been scheduled on Tuesday September 10, 2019 at 9:00a with Jenna Parrish NP.    At your first visit, you will meet your team of caregivers who will help you to develop pain management strategies that will last a lifetime. You will meet with our support staff to review your insurance information, and collect your co-payment if required by your insurance company. You will also meet with a medical pain specialist and care coordinator who will assess your pain and develop a plan of care for your successful pain rehabilitation. You should expect to spend 1-2 hours at your first visit with us. Usually, patients work with us for a period of 6-12 months, and eventually return to their primary doctor once their pain management has stabilized.      To help us make your visit go as smoothly as possible, please bring the following items with you on your visit:     Completed Pain Questionnaire enclosed in this packet.  If you do not bring the completed questionnaire, we may have to reschedule your appointment.  List of any medicines that you are currently taking or have been prescribed  Important NON-[facility] medical information such as medical records or tests results (X-rays, or laboratory tests)  Your health insurance card  Financial resources to cover your co-payment or balance due at the time of service (cash, personal check, Visa, and MasterCard are acceptable methods of payment)     Due to the demand for new patient evaluations, you must notify the scheduling department 48 hours in advance if you are not able to keep this appointment. Failure to do so could affect your ability to  reschedule with our clinic. Please be aware that we will not prescribe any medications at your first visit.     Please call [phone] with any questions regarding your appointment. We look forward to meeting you and working to address your health care needs.     Sincerely,    [facility]

## 2019-08-09 NOTE — TELEPHONE ENCOUNTER
Pain Management Center Referral      1. Confirmed address with patient? Yes  2. Confirmed phone number with patient? Yes  3. Confirmed referring provider? Yes  4. Is the PCP the same as the referring provider? Yes  5. Has the patient been to any previous pain clinics? Yes  (If yes, send DEBBIE with welcome letter)  6. Which insurance are we to bill for this appointment?  [employer]    7. Informed pt of cancellation (48 hour) policy? Yes    REGARDING OPIOID MEDICATIONS: We will always address appropriateness of opioid pain medications, but we generally will not automatically take on a prescribing role. When we do take on prescribing of opioids for chronic pain, it is in collaboration with the referring physician for an intermediate period of time (months), with an expectation that the primary physician or provider will assume the prescribing role if medications are effective at stable doses with demonstrated compliance. Therefore, please do not assume that your prescribing responsibilities end on the day of pain clinic consultation.  8. Informed pt of prescribing policy? Yes    9.Please be aware that once you are established with a pain provider and location, you will need to continue have all future visits with that provider and location. It is best to determine what location is the most convenient for you and schedule with that one.    ** PATIENT INFORMED OF THIS POLICY Yes      9. Referring Provider: [facility]     10. Criteria for Triage Eval:   -Missed/Failed 1st DUAL appointment? N/A   -Medication Focused? N/A   -Mental Health Concerns? (e.g. Recent psych hospitalization/snap shot)? N/A   -Active substance abuse? N/A   -Patient behaviors (e.g. Offensive language/raised voice)? N/A

## 2019-08-09 NOTE — TELEPHONE ENCOUNTER
Please call pt.  I have not seen him in a year and a half.  If he wishes to have me continue to fill this medication I need to see him prior to next refill.  If he has established primary care with a different provider then his next refill should come from them    Tiana Llanes

## 2019-08-09 NOTE — TELEPHONE ENCOUNTER
Patient says Dr. Llanes is no longer his primary doctor, but he is coming in for an appointment with her in September. Script faxed.     Alla Simpson, [facility]

## 2019-08-23 ENCOUNTER — ALLIED HEALTH/NURSE VISIT (OUTPATIENT)
Dept: FAMILY MEDICINE | Facility: CLINIC | Age: 59
End: 2019-08-23
Payer: COMMERCIAL

## 2019-08-23 DIAGNOSIS — Z23 NEED FOR VACCINATION: Primary | ICD-10-CM

## 2019-08-23 PROCEDURE — 90472 IMMUNIZATION ADMIN EACH ADD: CPT

## 2019-08-23 PROCEDURE — 90714 TD VACC NO PRESV 7 YRS+ IM: CPT

## 2019-08-23 PROCEDURE — 90750 HZV VACC RECOMBINANT IM: CPT

## 2019-08-23 PROCEDURE — 99207 ZZC NO CHARGE LOS: CPT

## 2019-08-23 PROCEDURE — 90471 IMMUNIZATION ADMIN: CPT

## 2019-08-23 NOTE — NURSING NOTE
Screening Questionnaire for Adult Immunization    Are you sick today?   No   Do you have allergies to medications, food, a vaccine component or latex?   No   Have you ever had a serious reaction after receiving a vaccination?   No   Do you have a long-term health problem with heart disease, lung disease, asthma, kidney disease, metabolic disease (e.g. diabetes), anemia, or other blood disorder?   No   Do you have cancer, leukemia, HIV/AIDS, or any other immune system problem?   No   In the past 3 months, have you taken medications that affect  your immune system, such as prednisone, other steroids, or anticancer drugs; drugs for the treatment of rheumatoid arthritis, Crohn s disease, or psoriasis; or have you had radiation treatments?   No   Have you had a seizure, or a brain or other nervous system problem?   No   During the past year, have you received a transfusion of blood or blood     products, or been given immune (gamma) globulin or antiviral drug?   No   For women: Are you pregnant or is there a chance you could become        pregnant during the next month?   No   Have you received any vaccinations in the past 4 weeks?   No     Immunization questionnaire answers were all negative.        Per orders of Danielle MOSLEY CNP, injection of Shingrix, DT given by Jenna Winter CMA. Patient instructed to remain in clinic for 15 minutes afterwards, and to report any adverse reaction to me immediately.       Screening performed by Jenna Winter CMA on 8/23/2019 at 11:55 AM.

## 2019-09-10 ENCOUNTER — OFFICE VISIT (OUTPATIENT)
Dept: PALLIATIVE MEDICINE | Facility: CLINIC | Age: 59
End: 2019-09-10
Attending: FAMILY MEDICINE
Payer: COMMERCIAL

## 2019-09-10 VITALS
BODY MASS INDEX: 30.18 KG/M2 | DIASTOLIC BLOOD PRESSURE: 89 MMHG | SYSTOLIC BLOOD PRESSURE: 124 MMHG | HEART RATE: 75 BPM | WEIGHT: 221 LBS

## 2019-09-10 DIAGNOSIS — M54.50 BILATERAL LOW BACK PAIN WITHOUT SCIATICA, UNSPECIFIED CHRONICITY: ICD-10-CM

## 2019-09-10 DIAGNOSIS — M25.50 MULTIPLE JOINT PAIN: ICD-10-CM

## 2019-09-10 DIAGNOSIS — M79.2 NEUROPATHIC PAIN: Primary | ICD-10-CM

## 2019-09-10 DIAGNOSIS — G90.522 COMPLEX REGIONAL PAIN SYNDROME TYPE 1 OF LEFT LOWER EXTREMITY: ICD-10-CM

## 2019-09-10 PROCEDURE — 99207 ZZC DOWN CODE DUE TO SUBSEQUENT EXAM: CPT | Performed by: NURSE PRACTITIONER

## 2019-09-10 PROCEDURE — 99215 OFFICE O/P EST HI 40 MIN: CPT | Performed by: NURSE PRACTITIONER

## 2019-09-10 RX ORDER — TOPIRAMATE 25 MG/1
TABLET, FILM COATED ORAL
Qty: 120 TABLET | Refills: 0 | Status: SHIPPED | OUTPATIENT
Start: 2019-09-10 | End: 2020-04-23

## 2019-09-10 ASSESSMENT — PAIN SCALES - GENERAL: PAINLEVEL: SEVERE PAIN (7)

## 2019-09-10 NOTE — PATIENT INSTRUCTIONS
PLAN  1. Medications:   1. Re-certified for medical cannabis today, the dispensary will send you an e-mail with further instructions. Try topical CBD balm.   2. Start Topamax 25mg at bedtime for 7 days, then increase by 1 tab at bedtime per week until up to 4 tabs at bedtime (100mg). If side effects, reduce to last tolerable dosage. Stay well hydrated with water and avoid alcohol.   3. Start Aspercreme with 4% lidocaine cream; this is found over-the-counter and may be used according to package instructions for topical pain relief.  2. Procedures: None at present   3. Follow-up with me in 8-12 weeks      ----------------------------------------------------------------  Clinic Number:  [phone]     Call with any questions about your care and for scheduling assistance.     Calls are returned Monday through Friday between 8 AM and 4:30 PM. We usually get back to you within 2 business days depending on the issue/request.    If we are prescribing your medications:    For opioid medication refills, call the clinic or send a anchor.travel message 7 days in advance.  Please include:    Name of requested medication    Name of the pharmacy.    For non-opioid medications, call your pharmacy directly to request a refill. Please allow 3-4 days to be processed.     Per [place] State Law:    All controlled substance prescriptions must be filled within 30 days of being written.      For those controlled substances allowing refills, pickup must occur within 30 days of last fill.      We believe regular attendance is key to your success in our program!      Any time you are unable to keep your appointment we ask that you call us at least 24 hours in advance to cancel.This will allow us to offer the appointment time to another patient.     Multiple missed appointments may lead to dismissal from the clinic.
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
## 2019-09-10 NOTE — NURSING NOTE
Rx for Topamax was signed and faxed to [place] [facility]. RightFax confirmed.     Zoe Feng MA

## 2019-09-12 ENCOUNTER — MYC MEDICAL ADVICE (OUTPATIENT)
Dept: PALLIATIVE MEDICINE | Facility: CLINIC | Age: 59
End: 2019-09-12

## 2019-09-13 NOTE — TELEPHONE ENCOUNTER
From: Zac Poe      Created: 9/12/2019 11:30 PM        *-*-*This message has not been handled.*-*-*    Vani Ellis,  I would like to try continuing with the CBD a while longer before I try a new prescription that we discussed.   It does help the pain and I am  not experiencing  any bad side effects.  Thank You for all your help,  Zac        Will forward to Jenna ADAM.    Raheem Alcaraz, RN  Care Coordinator   [facility] [place]

## 2019-09-13 NOTE — TELEPHONE ENCOUNTER
Zac-    Thanks for the update. Glad to hear the CBD is helpful.    Jenna MOSLEY RN CNP, LOLISP  [facility] [place]    I have sent the above myChart message to the patient.     Jenna MOSLEY RN CNP, LOLISP  [facility] [place]

## 2019-10-08 ENCOUNTER — MYC REFILL (OUTPATIENT)
Dept: OTHER | Age: 59
End: 2019-10-08

## 2019-10-08 ENCOUNTER — MYC MEDICAL ADVICE (OUTPATIENT)
Dept: FAMILY MEDICINE | Facility: CLINIC | Age: 59
End: 2019-10-08

## 2019-10-08 DIAGNOSIS — G89.4 CHRONIC PAIN SYNDROME: Chronic | ICD-10-CM

## 2019-10-08 DIAGNOSIS — N13.8 BENIGN PROSTATIC HYPERPLASIA WITH URINARY OBSTRUCTION: Primary | Chronic | ICD-10-CM

## 2019-10-08 DIAGNOSIS — N40.1 BENIGN PROSTATIC HYPERPLASIA WITH URINARY OBSTRUCTION: Primary | Chronic | ICD-10-CM

## 2019-10-09 DIAGNOSIS — N40.1 BENIGN PROSTATIC HYPERPLASIA WITH URINARY OBSTRUCTION: Primary | Chronic | ICD-10-CM

## 2019-10-09 DIAGNOSIS — N13.8 BENIGN PROSTATIC HYPERPLASIA WITH URINARY OBSTRUCTION: Primary | Chronic | ICD-10-CM

## 2019-10-09 NOTE — TELEPHONE ENCOUNTER
"Dr Sears,    Please see mychart notes.   Patient had a preop in June, and is asking if he can just do a PSA now, and come in for \"preventative\" visit in the \"spring\"  instead ? (financial concerns)      Thanks,   Marie Bunn RNC    "

## 2019-10-23 DIAGNOSIS — Z13.220 SCREENING FOR LIPID DISORDERS: ICD-10-CM

## 2019-10-23 DIAGNOSIS — N40.1 BENIGN PROSTATIC HYPERPLASIA WITH URINARY OBSTRUCTION: Chronic | ICD-10-CM

## 2019-10-23 DIAGNOSIS — N13.8 BENIGN PROSTATIC HYPERPLASIA WITH URINARY OBSTRUCTION: Chronic | ICD-10-CM

## 2019-10-23 LAB
CHOLEST SERPL-MCNC: 146 MG/DL
HDLC SERPL-MCNC: 40 MG/DL
LDLC SERPL CALC-MCNC: 46 MG/DL
NONHDLC SERPL-MCNC: 106 MG/DL
PSA SERPL-ACNC: 3.12 UG/L (ref 0–4)
TRIGL SERPL-MCNC: 299 MG/DL

## 2019-10-23 PROCEDURE — 80061 LIPID PANEL: CPT | Performed by: FAMILY MEDICINE

## 2019-10-23 PROCEDURE — 36415 COLL VENOUS BLD VENIPUNCTURE: CPT | Performed by: FAMILY MEDICINE

## 2019-10-23 PROCEDURE — 84153 ASSAY OF PSA TOTAL: CPT | Performed by: FAMILY MEDICINE

## 2019-11-03 ENCOUNTER — HEALTH MAINTENANCE LETTER (OUTPATIENT)
Age: 59
End: 2019-11-03

## 2019-11-03 ENCOUNTER — MYC REFILL (OUTPATIENT)
Dept: FAMILY MEDICINE | Facility: CLINIC | Age: 59
End: 2019-11-03

## 2019-11-03 ENCOUNTER — MYC REFILL (OUTPATIENT)
Dept: OTHER | Age: 59
End: 2019-11-03

## 2019-11-03 ENCOUNTER — E-VISIT (OUTPATIENT)
Dept: FAMILY MEDICINE | Facility: CLINIC | Age: 59
End: 2019-11-03
Payer: COMMERCIAL

## 2019-11-03 DIAGNOSIS — G57.72 CAUSALGIA OF LOWER LIMB, LEFT: ICD-10-CM

## 2019-11-03 DIAGNOSIS — F41.8 MIXED ANXIETY DEPRESSIVE DISORDER: Chronic | ICD-10-CM

## 2019-11-03 PROCEDURE — 99207 ZZC NO BILLABLE SERVICE THIS VISIT: CPT | Performed by: FAMILY MEDICINE

## 2019-11-03 RX ORDER — LORAZEPAM 1 MG/1
0.5 TABLET ORAL EVERY 8 HOURS PRN
Qty: 15 TABLET | Refills: 0 | Status: CANCELLED | OUTPATIENT
Start: 2019-11-03

## 2019-11-03 ASSESSMENT — PATIENT HEALTH QUESTIONNAIRE - PHQ9
SUM OF ALL RESPONSES TO PHQ QUESTIONS 1-9: 2
10. IF YOU CHECKED OFF ANY PROBLEMS, HOW DIFFICULT HAVE THESE PROBLEMS MADE IT FOR YOU TO DO YOUR WORK, TAKE CARE OF THINGS AT HOME, OR GET ALONG WITH OTHER PEOPLE: SOMEWHAT DIFFICULT
SUM OF ALL RESPONSES TO PHQ QUESTIONS 1-9: 2

## 2019-11-03 ASSESSMENT — ANXIETY QUESTIONNAIRES
GAD7 TOTAL SCORE: 3
GAD7 TOTAL SCORE: 3
7. FEELING AFRAID AS IF SOMETHING AWFUL MIGHT HAPPEN: NOT AT ALL
3. WORRYING TOO MUCH ABOUT DIFFERENT THINGS: SEVERAL DAYS
2. NOT BEING ABLE TO STOP OR CONTROL WORRYING: NOT AT ALL
7. FEELING AFRAID AS IF SOMETHING AWFUL MIGHT HAPPEN: NOT AT ALL
4. TROUBLE RELAXING: SEVERAL DAYS
1. FEELING NERVOUS, ANXIOUS, OR ON EDGE: SEVERAL DAYS
5. BEING SO RESTLESS THAT IT IS HARD TO SIT STILL: NOT AT ALL
6. BECOMING EASILY ANNOYED OR IRRITABLE: NOT AT ALL
GAD7 TOTAL SCORE: 3

## 2019-11-04 ASSESSMENT — ANXIETY QUESTIONNAIRES: GAD7 TOTAL SCORE: 3

## 2019-11-04 ASSESSMENT — PATIENT HEALTH QUESTIONNAIRE - PHQ9: SUM OF ALL RESPONSES TO PHQ QUESTIONS 1-9: 2

## 2019-11-05 NOTE — TELEPHONE ENCOUNTER
I have not seen pt in some time.  Looks like most recently he has been seen by Dr Sears in [facility] who addressed this issue in June. Please forward request to her pool.  Thanks  Tiana Llanes DO

## 2019-11-05 NOTE — TELEPHONE ENCOUNTER
Routing refill request to provider for review/approval because:  Drug not on the [facility] refill protocol     Aminah Vela RN

## 2019-11-05 NOTE — TELEPHONE ENCOUNTER
Last seen in June by Dr NIKI Sears.   I can see she has sent in an Evisit on 11/3/19 which hasn't been addressed yet.   Will route this to Dr Sears who is in tomorrow, as it appears it is the same request as the Evisit.     Marie Bunn RNC

## 2019-11-06 RX ORDER — LORAZEPAM 1 MG/1
0.5 TABLET ORAL EVERY 8 HOURS PRN
Qty: 15 TABLET | Refills: 0 | Status: SHIPPED | OUTPATIENT
Start: 2019-11-06 | End: 2020-01-06

## 2019-11-06 NOTE — TELEPHONE ENCOUNTER
It looks like this is just a refill request for ativan. Ok to refill. Prescription faxed to pharmacy, please notify patient.  Was there more that he wanted?

## 2019-11-06 NOTE — TELEPHONE ENCOUNTER
Routing refill request to provider for review/approval because:  Drug not on the [facility], [facility] or [facility] refill protocol or controlled substance  Kala Malloy RN

## 2019-11-07 NOTE — TELEPHONE ENCOUNTER
Looks like pt has transferred care to [facility].  Please forward request to their pool to address.  I have not seen him in >1 year    Tiana Llanes,

## 2019-11-11 RX ORDER — LORAZEPAM 1 MG/1
TABLET ORAL
Qty: 15 TABLET | Refills: 0 | OUTPATIENT
Start: 2019-11-11

## 2019-11-11 NOTE — TELEPHONE ENCOUNTER
"I spoke with patient, he had sent in an Evisit and received this rx from Dr NIKI Sears, \"yes, I live closer to [facility] now, you can go ahead and change my doctor and prescriptions to Dr NIKI Sears, thanks a lot for the call. \"    He has received this thru an evisit.   Duplicate request.   Marie Bunn RNC    "

## 2019-11-12 NOTE — TELEPHONE ENCOUNTER
Patient has seen sleep medicine/ Dr Yancey in the past.   This is historical on the chart.   Will route to sleep to see if they can address, thank you!   Marie Bunn RNC

## 2019-11-12 NOTE — TELEPHONE ENCOUNTER
Pt has not been seen in sleep since 2017.     Rx for ativan was just written by :    11/06/19 0936 Sign Parrish Sears MD Reorder from Order: [id number]   11/06/19 0936 Taking Flag Checked Parrish Sears MD    Outpatient Medication Detail      Disp Refills Start End ECHO   LORazepam (ATIVAN) 1 MG tablet 15 tablet 0 11/6/2019       Please forward to correct provider.     Rosamaria Wallace MA on 11/12/2019 at 9:52 AM

## 2019-11-14 DIAGNOSIS — G47.33 OBSTRUCTIVE SLEEP APNEA (ADULT) (PEDIATRIC): Primary | ICD-10-CM

## 2019-11-19 ENCOUNTER — HOSPITAL ENCOUNTER (OUTPATIENT)
Dept: CT IMAGING | Facility: CLINIC | Age: 59
Discharge: HOME | End: 2019-11-19
Attending: INTERNAL MEDICINE | Admitting: INTERNAL MEDICINE
Payer: COMMERCIAL

## 2019-11-19 DIAGNOSIS — R93.89 ABNORMAL CT SCAN, CHEST: ICD-10-CM

## 2019-11-19 DIAGNOSIS — R91.1 LUNG NODULE: ICD-10-CM

## 2019-11-19 PROCEDURE — 71250 CT THORAX DX C-: CPT

## 2019-11-19 NOTE — TELEPHONE ENCOUNTER
This request was for DME., and I called sleep clinic to advise them of that.   I do see in the meantime he has seen a sleep pulmonologist at [facility] .     Marie Bunn RNC

## 2019-11-22 ENCOUNTER — TELEPHONE (OUTPATIENT)
Dept: SLEEP MEDICINE | Facility: CLINIC | Age: 59
End: 2019-11-22

## 2019-11-22 NOTE — TELEPHONE ENCOUNTER
Return Pt  Call and let him know that we are waiting for the sleep study to be re-scored. Let PT know we will reach out and schedule when competed. Pt stated also that he will research between the two machines that we carry and decide on the one that he will want since this will be a replacement machine.

## 2020-01-04 DIAGNOSIS — F41.8 MIXED ANXIETY DEPRESSIVE DISORDER: Chronic | ICD-10-CM

## 2020-01-04 DIAGNOSIS — G57.72 CAUSALGIA OF LOWER LIMB, LEFT: ICD-10-CM

## 2020-01-06 RX ORDER — LORAZEPAM 1 MG/1
TABLET ORAL
Qty: 15 TABLET | Refills: 0 | Status: SHIPPED | OUTPATIENT
Start: 2020-01-06 | End: 2020-03-09

## 2020-01-06 NOTE — TELEPHONE ENCOUNTER
Requested Prescriptions   Pending Prescriptions Disp Refills     LORazepam (ATIVAN) 1 MG tablet [Pharmacy Med Name: LORAZEPAM 1MG TABS] 15 tablet 0     Sig: TAKE ONE-HALF TABLET BY MOUTH EVERY 8 HOURS AS NEEDED FOR ANXIETY       There is no refill protocol information for this order              Last Written Prescription Date:  11/6/2019  Last Fill Quantity: 15,   # refills: 0  Last Office Visit: 6/27/2019 with Or[facility]   Future Office visit:       Routing refill request to provider for review/approval because:  Drug not on the [facility], [facility] or [facility] refill protocol or controlled substance

## 2020-01-08 ENCOUNTER — TELEPHONE (OUTPATIENT)
Dept: FAMILY MEDICINE | Facility: CLINIC | Age: 60
End: 2020-01-08

## 2020-01-08 DIAGNOSIS — K21.9 GASTROESOPHAGEAL REFLUX DISEASE, ESOPHAGITIS PRESENCE NOT SPECIFIED: ICD-10-CM

## 2020-01-08 NOTE — TELEPHONE ENCOUNTER
PRIOR AUTHORIZATION DENIED    Medication: omeprazole-sodium bicarbonate-DENIED    Denial Date: 1/8/2020    Denial Rational: Patient must have a history of trial & failure to the formulary alternative(s) or have a contraindication or intolerance to the formulary alternatives: pantoprazole          Appeal Information:     If provider would like to appeal please provide a letter of medical necessity stating why formulary alternatives would not be clinically appropriate for patient and route back to the PA team.

## 2020-01-08 NOTE — TELEPHONE ENCOUNTER
Central Prior Authorization Team   Phone: [phone]      PA Initiation    Medication: omeprazole-sodium bicarbonate-Initiated  Insurance Company: [employer] - Phone [phone] Fax [phone]  Pharmacy Filling the Rx: [facility] - [place], [place] - [address]  Filling Pharmacy Phone: [phone]  Filling Pharmacy Fax:    Start Date: 1/8/2020

## 2020-01-09 NOTE — TELEPHONE ENCOUNTER
Dr. Sears  PA for omeprazole has been denied.  Patient's need to have tried and failed pantoprazole.  Carmen Da Silva

## 2020-01-09 NOTE — TELEPHONE ENCOUNTER
Please advise patient. He can but omeprazole over the counter or we can switch him to prescription pantoprazole. Which would he prefer?

## 2020-01-09 NOTE — TELEPHONE ENCOUNTER
Pt calling back - He states that he talked to his insurance company and they won't cover Rx for Omeprazole, because it has Bicarb in it.  Please send new Rx for plain Omeprazole 20mg capsules to  [facility]  No need to call patient back, unless there are questions or problems.

## 2020-03-07 DIAGNOSIS — F41.8 MIXED ANXIETY DEPRESSIVE DISORDER: Chronic | ICD-10-CM

## 2020-03-07 DIAGNOSIS — G57.72 CAUSALGIA OF LOWER LIMB, LEFT: ICD-10-CM

## 2020-03-09 RX ORDER — LORAZEPAM 1 MG/1
TABLET ORAL
Qty: 15 TABLET | Refills: 0 | Status: SHIPPED | OUTPATIENT
Start: 2020-03-09 | End: 2020-04-19

## 2020-03-09 NOTE — TELEPHONE ENCOUNTER
Requested Prescriptions   Pending Prescriptions Disp Refills     LORazepam (ATIVAN) 1 MG tablet [Pharmacy Med Name: LORAZEPAM 1MG TABS] 15 tablet 0     Sig: TAKE ONE-HALF TABLET BY MOUTH EVERY 8 HOURS AS NEEDED FOR ANXIETY       There is no refill protocol information for this order        Last Written Prescription Date:  1/6/20  Last Fill Quantity: 15,  # refills: 0   Last Office Visit with [facility] or [facility] prescribing provider:  6/27/19   Future Office Visit:    Next 5 appointments (look out 90 days)    May 12, 2020 11:00 AM CDT  Return Visit with DIMITRI Corona CNP  [facility] [place] ([facility] [place]) [address]  [place] [address] [phone]

## 2020-03-09 NOTE — TELEPHONE ENCOUNTER
Routing refill request to provider for review/approval because: Drug not on the [facility] refill protocol   Last ordered by A Choco MOSLEY    Last OV 6/17/19 with Dr. Sears: Return in about 1 year (around 6/27/2020) for Physical.     Melinda NGUYEN RN

## 2020-04-19 ENCOUNTER — MYC REFILL (OUTPATIENT)
Dept: FAMILY MEDICINE | Facility: CLINIC | Age: 60
End: 2020-04-19

## 2020-04-19 ENCOUNTER — MYC MEDICAL ADVICE (OUTPATIENT)
Dept: PALLIATIVE MEDICINE | Facility: CLINIC | Age: 60
End: 2020-04-19

## 2020-04-19 DIAGNOSIS — F41.8 MIXED ANXIETY DEPRESSIVE DISORDER: Chronic | ICD-10-CM

## 2020-04-19 DIAGNOSIS — G57.72 CAUSALGIA OF LOWER LIMB, LEFT: ICD-10-CM

## 2020-04-20 RX ORDER — LORAZEPAM 1 MG/1
0.5 TABLET ORAL EVERY 8 HOURS PRN
Qty: 15 TABLET | Refills: 0 | Status: SHIPPED | OUTPATIENT
Start: 2020-04-20 | End: 2020-06-04

## 2020-04-20 NOTE — TELEPHONE ENCOUNTER
[email or web address] message from patient on 04/19/2020 at 1313:  Vani Ellis  Due to the current situation would it be possible to do an E-visit to discuss the CRPS pain in my foot/ankle rather than my scheduled upcoming visit?  We have been staying in for at least 6 weeks now?  Thank You  Zac Poe  [phone]  ______________________________________________    [email or web address] message sent to patient:  Good Morning Zac,    As of right now we are doing E-Visits due to the COVID pandemic. Our scheduling department is reaching out to our patients prior to their scheduled date letting them know how the E-Visit will work.  Now if the band is lifted before your appointment date, Jenna will most likely want you to come into the office for this evaluation.  We will forward your message to Jenna Parrish NP to review. If she has different information that she would like relayed to you, we will reach back out to you by [email or web address].  Hope you have a good day.    Take Care,    Raven Camejo RN  Care Coordinator  [facility]     Routing to provider to review.

## 2020-04-20 NOTE — TELEPHONE ENCOUNTER
Routing refill request to provider for review/approval because:  Drug not on the [facility] refill protocol   Last ordered by Dr. Horne     Requested Prescriptions   Pending Prescriptions Disp Refills     LORazepam (ATIVAN) 1 MG tablet 15 tablet 0       There is no refill protocol information for this order          Last Written Prescription Date:  3/9/2020  Last Fill Quantity: 15,  # refills: 0   Last office visit: 8/23/2019 with Dr. Sears   Future Office Visit:   Next 5 appointments (look out 90 days)    May 12, 2020 11:00 AM CDT  Return Visit with DIMITRI Corona [facility] ([facility]) [id number] [address] [id number] [phone]           Melinda NGUYEN, RN, BSN

## 2020-04-23 ENCOUNTER — E-VISIT (OUTPATIENT)
Dept: FAMILY MEDICINE | Facility: CLINIC | Age: 60
End: 2020-04-23

## 2020-04-23 ENCOUNTER — MYC MEDICAL ADVICE (OUTPATIENT)
Dept: PALLIATIVE MEDICINE | Facility: CLINIC | Age: 60
End: 2020-04-23

## 2020-04-23 ENCOUNTER — VIRTUAL VISIT (OUTPATIENT)
Dept: FAMILY MEDICINE | Facility: CLINIC | Age: 60
End: 2020-04-23
Payer: COMMERCIAL

## 2020-04-23 ENCOUNTER — TELEPHONE (OUTPATIENT)
Dept: FAMILY MEDICINE | Facility: CLINIC | Age: 60
End: 2020-04-23

## 2020-04-23 DIAGNOSIS — M79.18 LUMBAR MUSCLE PAIN: Primary | ICD-10-CM

## 2020-04-23 DIAGNOSIS — M54.50 ACUTE LOW BACK PAIN, UNSPECIFIED BACK PAIN LATERALITY, UNSPECIFIED WHETHER SCIATICA PRESENT: Primary | ICD-10-CM

## 2020-04-23 PROCEDURE — 99213 OFFICE O/P EST LOW 20 MIN: CPT | Mod: 95 | Performed by: FAMILY MEDICINE

## 2020-04-23 PROCEDURE — 99207 ZZC NON-BILLABLE SERV PER CHARTING: CPT | Performed by: FAMILY MEDICINE

## 2020-04-23 RX ORDER — METHOCARBAMOL 750 MG/1
750 TABLET, FILM COATED ORAL 4 TIMES DAILY
Qty: 120 TABLET | Refills: 0 | Status: SHIPPED | OUTPATIENT
Start: 2020-04-23 | End: 2020-07-27

## 2020-04-23 ASSESSMENT — ANXIETY QUESTIONNAIRES
3. WORRYING TOO MUCH ABOUT DIFFERENT THINGS: SEVERAL DAYS
1. FEELING NERVOUS, ANXIOUS, OR ON EDGE: SEVERAL DAYS
GAD7 TOTAL SCORE: 7
2. NOT BEING ABLE TO STOP OR CONTROL WORRYING: MORE THAN HALF THE DAYS
6. BECOMING EASILY ANNOYED OR IRRITABLE: NOT AT ALL
7. FEELING AFRAID AS IF SOMETHING AWFUL MIGHT HAPPEN: SEVERAL DAYS
5. BEING SO RESTLESS THAT IT IS HARD TO SIT STILL: SEVERAL DAYS

## 2020-04-23 ASSESSMENT — PATIENT HEALTH QUESTIONNAIRE - PHQ9
SUM OF ALL RESPONSES TO PHQ QUESTIONS 1-9: 3
5. POOR APPETITE OR OVEREATING: SEVERAL DAYS

## 2020-04-23 NOTE — TELEPHONE ENCOUNTER
If he's willing this would probably be better as a video or telephone visit as I have lot's of additional questions and would be a lot of back and forth typing.

## 2020-04-23 NOTE — PROGRESS NOTES
"Mata Poe is a 60 year old male who is being evaluated via a billable telephone visit.      The patient has been notified of following:     \"This telephone visit will be conducted via a call between you and your physician/provider. We have found that certain health care needs can be provided without the need for a physical exam.  This service lets us provide the care you need with a short phone conversation.  If a prescription is necessary we can send it directly to your pharmacy.  If lab work is needed we can place an order for that and you can then stop by our lab to have the test done at a later time.    Telephone visits are billed at different rates depending on your insurance coverage. During this emergency period, for some insurers they may be billed the same as an in-person visit.  Please reach out to your insurance provider with any questions.    If during the course of the call the physician/provider feels a telephone visit is not appropriate, you will not be charged for this service.\"    Patient has given verbal consent for Telephone visit?  Yes    How would you like to obtain your AVS? Meryhart    Subjective     Mata Poe is a 60 year old male who presents to clinic today for the following health issues:    HPI  Back Pain       Duration: 2 weeks        Specific cause: Not sure states he might of twisted back    Description:   Location of pain: low back right  Character of pain: sharp, burning and constant  Pain radiation:none  New numbness or weakness in legs, not attributed to pain:  no     Intensity: Currently 10/10, At its worst 10/10    History:   Pain interferes with job: No  History of back problems: previous history of low back problems  Any previous MRI or X-rays: Yes- at [facility].  Date 2017  Sees a specialist for back pain:  [facility]  Therapies tried without relief: Aleve, cold and rest, Physical Therapy    Alleviating factors:   Improved by: " Aleve, cold and rest      Precipitating factors:  Worsened by: Bending, Standing and Sitting          Accompanying Signs & Symptoms:  Risk of Fracture:  None  Risk of Cauda Equina:  None  Risk of Infection:  None  Risk of Cancer:  None  Risk of Ankylosing Spondylitis:  Onset at age <35, male, AND morning back stiffness. no                         Patient Active Problem List   Diagnosis     Chronic pain syndrome     Allergic rhinitis     Abnormal CT scan, chest     24 hr info given     Hyperlipidemia LDL goal <130     Elevated fasting glucose     Obstructive Sleep Apnea severe (AHI=70)     Mixed anxiety depressive disorder     Benign prostatic hyperplasia with urinary obstruction     Polyp of colon     Insomnia due to medical condition     Past Surgical History:   Procedure Laterality Date     HERNIA REPAIR  1996    Right Hernia     ORTHOPEDIC SURGERY  2003    left foot       Social History     Tobacco Use     Smoking status: Former Smoker     Smokeless tobacco: Never Used     Tobacco comment: quit in 1999   Substance Use Topics     Alcohol use: Yes     Comment: not while on Paxil     Family History   Problem Relation Age of Onset     Hypertension Father      Cerebrovascular Disease Father      Cancer Father      Cancer Sister         intestinal cancer     Cancer - colorectal Sister      Arthritis Daughter      Cancer - colorectal Sister      Cancer Mother      Diabetes No family hx of      C.A.D. No family hx of      Breast Cancer No family hx of      Prostate Cancer No family hx of          Current Outpatient Medications   Medication Sig Dispense Refill     alfuzosin ER (UROXATRAL) 10 MG 24 hr tablet Take 1 tablet (10 mg) by mouth daily 90 tablet 3     atorvastatin (LIPITOR) 40 MG tablet Take 1 tablet (40 mg) by mouth daily 90 tablet 3     latanoprost (XALATAN) 0.005 % ophthalmic solution        LORazepam (ATIVAN) 1 MG tablet Take 0.5 tablets (0.5 mg) by mouth every 8 hours as needed for anxiety 15 tablet 0      methocarbamol (ROBAXIN) 750 MG tablet Take 1 tablet (750 mg) by mouth 4 times daily 120 tablet 0     omeprazole (PRILOSEC) 20 MG DR capsule Take 1 capsule (20 mg) by mouth daily 90 capsule 3     ORDER FOR DME Respironics REMSTAR 60 Series Auto CPAP 7cm H2O, Airfit P10 nasal pillow mask w/medium pillows       PARoxetine (PAXIL) 10 MG tablet Take 1 tablet (10 mg) by mouth every morning 90 tablet 3     sildenafil (REVATIO) 20 MG tablet Take 20 mg by mouth as needed       traZODone (DESYREL) 50 MG tablet Take 1 tablet (50 mg) by mouth nightly as needed for sleep 90 tablet 3     Allergies   Allergen Reactions     Lyrica [Pregabalin]      Felt really edgy and anxious on Lyrica 25mg dosing       Reviewed and updated as needed this visit by Provider  Tobacco  Allergies  Meds  Problems  Med Hx  Surg Hx  Fam Hx         Review of Systems   ROS COMP: Constitutional, neuro, ENT, endocrine, pulmonary, cardiac, gastrointestinal, genitourinary, musculoskeletal, integument and psychiatric systems are negative, except as otherwise noted.        Objective   Reported vitals:  There were no vitals taken for this visit.   healthy, alert and no distress  PSYCH: Alert and oriented times 3; coherent speech, normal   rate and volume, able to articulate logical thoughts, able   to abstract reason, no tangential thoughts, no hallucinations   or delusions  His affect is normal and pleasant  RESP: No cough, no audible wheezing, able to talk in full sentences  Remainder of exam unable to be completed due to telephone visits            Assessment/Plan:  1. Lumbar muscle pain  Trial robaxin since this has worked in the past. Follow-up if not improving or if worsening.   - methocarbamol (ROBAXIN) 750 MG tablet; Take 1 tablet (750 mg) by mouth 4 times daily  Dispense: 120 tablet; Refill: 0    Return in about 1 month (around 5/23/2020) for if not improved or sooner if worsening.      Phone call duration:  8 minutes    Parrish Sears  MD

## 2020-04-23 NOTE — TELEPHONE ENCOUNTER
Sabrina below from patient.   Vani Frank,      If you want to schedule a virtual appointment you may do so by calling [phone]. I do see you are scheduled for 05/12 currently which is listed as in office.  At this time, we are unsure if we will be seeing patients in person or if that 05/12 appointment will be virtual.  You can certainly call to make a sooner appointment virtually.      Sara LUNA, RN Care Coordinator  [facility]  Pain Management  --------------  Hello Dr.  We have been staying home for quite some time now and due to health concerns I would like to schedule an e visit if you would not mind?  Sincerely,  Zac LUNA, RN Care Coordinator  [facility]  Pain Management     BODY ACHES/HEADACHE/PAIN/FEVER

## 2020-04-23 NOTE — PATIENT INSTRUCTIONS
Our Clinic hours are:  Mondays    7:20 am - 7 pm  Tues -  Fri  7:20 am - 5 pm    Clinic Phone: [phone]    The clinic lab opens at 7:30 am Mon - Fri and appointments are required.    [facility]. [phone]  Monday  8 am - 7pm  Tues - Fri 8 am - 5:30 pm

## 2020-04-24 ASSESSMENT — ANXIETY QUESTIONNAIRES: GAD7 TOTAL SCORE: 7

## 2020-05-12 ENCOUNTER — VIRTUAL VISIT (OUTPATIENT)
Dept: PALLIATIVE MEDICINE | Facility: CLINIC | Age: 60
End: 2020-05-12
Payer: COMMERCIAL

## 2020-05-12 DIAGNOSIS — M79.2 NEUROPATHIC PAIN: Primary | ICD-10-CM

## 2020-05-12 DIAGNOSIS — G90.522 COMPLEX REGIONAL PAIN SYNDROME TYPE 1 OF LEFT LOWER EXTREMITY: ICD-10-CM

## 2020-05-12 PROCEDURE — 99214 OFFICE O/P EST MOD 30 MIN: CPT | Mod: 95 | Performed by: NURSE PRACTITIONER

## 2020-05-12 RX ORDER — BRIMONIDINE TARTRATE 2 MG/ML
SOLUTION/ DROPS OPHTHALMIC
COMMUNITY
Start: 2019-06-25 | End: 2020-10-28

## 2020-05-12 ASSESSMENT — PAIN SCALES - GENERAL: PAINLEVEL: EXTREME PAIN (8)
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
## 2020-05-12 NOTE — PATIENT INSTRUCTIONS
Plan:   1. Physical Therapy:  None at present time  2. Clinical Health Psychologist: Zac is working with an outside therapist with EMDR, I am in support of this  3. Diagnostic Studies:  none  4. Medication Management:    1. None at present  2. Discussed possible future use of Topamax  3. Interested in use of CBD from hemp as used to get relief from [place] Medical Cannabis program but now lives in [place]  1. I recommend looking into the online companies of [employer] or [employer] or "[employer] (this is a [place] company)  5. Further procedures recommended: none  1. Office to send handouts to patient re: spinal cord stimulation for review  6. Recommendations to PCP: see above  7. Follow up: 12 weeks

## 2020-06-03 DIAGNOSIS — G57.72 CAUSALGIA OF LOWER LIMB, LEFT: ICD-10-CM

## 2020-06-03 DIAGNOSIS — F41.8 MIXED ANXIETY DEPRESSIVE DISORDER: Chronic | ICD-10-CM

## 2020-06-03 NOTE — LETTER
June 5, 2020      Mata Poe  [address]  [facility] [address]        Dear Mata,     We received a refill request for your lorazepam medication.  This medication has been refilled for 30 days as you are due for an office visit for further refills.  Please call [phone] to schedule an appointment.        Sincerely,        Jeovanny Horne MD

## 2020-06-04 RX ORDER — LORAZEPAM 1 MG/1
0.5 TABLET ORAL EVERY 8 HOURS PRN
Qty: 15 TABLET | Refills: 0 | Status: SHIPPED | OUTPATIENT
Start: 2020-06-04 | End: 2020-06-08

## 2020-06-08 DIAGNOSIS — F41.8 MIXED ANXIETY DEPRESSIVE DISORDER: Chronic | ICD-10-CM

## 2020-06-08 DIAGNOSIS — G57.72 CAUSALGIA OF LOWER LIMB, LEFT: ICD-10-CM

## 2020-06-08 RX ORDER — LORAZEPAM 1 MG/1
TABLET ORAL
Qty: 15 TABLET | Refills: 0 | Status: SHIPPED | OUTPATIENT
Start: 2020-06-08 | End: 2020-07-27

## 2020-06-08 NOTE — TELEPHONE ENCOUNTER
Spoke with the pharmacy and the medication was sent to the wrong home address.  This medication was not received by the patient. The pharmacy has updated the address but needs a new prescription.  Ok to resend RX?    Thank you    Kathia CARROLL RN

## 2020-06-08 NOTE — TELEPHONE ENCOUNTER
Reason for Call:  Medication or medication refill:    Do you use a [facility]?  Name of the pharmacy and phone number for the current request:  [facility]    Name of the medication requested: Lorazepam - [facility] calling.  Pt's Lorazepam Rx went to the wrong address so they are asking for a new Rx.  No need to call pharmacy back, unless there are questions or problems.      Other request:     Can we leave a detailed message on this number? YES    Phone number pharmacy can be reached at: Other phone number:  [phone]    Best Time: any    Call taken on 6/8/2020 at 9:09 AM by Radha Damon

## 2020-07-16 DIAGNOSIS — G47.9 DISTURBANCE IN SLEEP BEHAVIOR: ICD-10-CM

## 2020-07-16 DIAGNOSIS — E78.5 HYPERLIPIDEMIA LDL GOAL <130: Chronic | ICD-10-CM

## 2020-07-16 DIAGNOSIS — G89.4 CHRONIC PAIN SYNDROME: Chronic | ICD-10-CM

## 2020-07-16 DIAGNOSIS — F41.8 MIXED ANXIETY DEPRESSIVE DISORDER: Chronic | ICD-10-CM

## 2020-07-17 RX ORDER — PAROXETINE 10 MG/1
TABLET, FILM COATED ORAL
Qty: 90 TABLET | Refills: 3 | OUTPATIENT
Start: 2020-07-17

## 2020-07-17 RX ORDER — ATORVASTATIN CALCIUM 40 MG/1
TABLET, FILM COATED ORAL
Qty: 90 TABLET | Refills: 3 | OUTPATIENT
Start: 2020-07-17

## 2020-07-17 RX ORDER — TRAZODONE HYDROCHLORIDE 50 MG/1
TABLET, FILM COATED ORAL
Qty: 90 TABLET | Refills: 3 | OUTPATIENT
Start: 2020-07-17

## 2020-07-17 NOTE — TELEPHONE ENCOUNTER
Due for visit. Please schedule for an office visit or virtual visit for medication check visit and labs.  Refills will be addressed at that visit. Ok to refill x 30 days if he will run out prior to visit.

## 2020-07-17 NOTE — TELEPHONE ENCOUNTER
"Requested Prescriptions   Pending Prescriptions Disp Refills     atorvastatin (LIPITOR) 40 MG tablet [Pharmacy Med Name: ATORVASTATIN CALCIUM 40MG TABS] 90 tablet 3     Sig: TAKE ONE TABLET BY MOUTH ONCE DAILY       Statins Protocol Passed - 7/16/2020  9:38 AM        Passed - LDL on file in past 12 months     Recent Labs   Lab Test 10/23/19  1110   LDL 46             Passed - No abnormal creatine kinase in past 12 months     No lab results found.             Passed - Recent (12 mo) or future (30 days) visit within the authorizing provider's specialty     Patient has had an office visit with the authorizing provider or a provider within the authorizing providers department within the previous 12 mos or has a future within next 30 days. See \"Patient Info\" tab in inbasket, or \"Choose Columns\" in Meds & Orders section of the refill encounter.              Passed - Medication is active on med list        Passed - Patient is age 18 or older           PARoxetine (PAXIL) 10 MG tablet [Pharmacy Med Name: PAROXETINE HCL 10MG TABS] 90 tablet 3     Sig: TAKE ONE TABLET BY MOUTH EVERY MORNING       SSRIs Protocol Passed - 7/16/2020  9:38 AM        Passed - PHQ-9 score less than 5 in past 6 months     Please review last PHQ-9 score.           Passed - Medication is active on med list        Passed - Patient is age 18 or older        Passed - Recent (6 mo) or future (30 days) visit within the authorizing provider's specialty     Patient had office visit in the last 6 months or has a visit in the next 30 days with authorizing provider or within the authorizing provider's specialty.  See \"Patient Info\" tab in inbasket, or \"Choose Columns\" in Meds & Orders section of the refill encounter.               traZODone (DESYREL) 50 MG tablet [Pharmacy Med Name: TRAZODONE HCL 50MG TABS] 90 tablet 3     Sig: TAKE 1 TABLET BY MOUTH NIGHTLY AS NEEDED FOR SLEEP       Serotonin Modulators Passed - 7/16/2020  9:38 AM        Passed - Recent (12 mo) " "or future (30 days) visit within the authorizing provider's specialty     Patient has had an office visit with the authorizing provider or a provider within the authorizing providers department within the previous 12 mos or has a future within next 30 days. See \"Patient Info\" tab in inbasket, or \"Choose Columns\" in Meds & Orders section of the refill encounter.              Passed - Medication is active on med list        Passed - Patient is age 18 or older             "

## 2020-07-17 NOTE — TELEPHONE ENCOUNTER
"Routing refill request to provider for review/approval because:  Patient needs to be seen because it has been more than 1 year since last office visit.  Last actual OV was June 2019.          Requested Prescriptions   Pending Prescriptions Disp Refills     atorvastatin (LIPITOR) 40 MG tablet [Pharmacy Med Name: ATORVASTATIN CALCIUM 40MG TABS] 90 tablet 3     Sig: TAKE ONE TABLET BY MOUTH ONCE DAILY       Statins Protocol Passed - 7/17/2020 10:03 AM        Passed - LDL on file in past 12 months     Recent Labs   Lab Test 10/23/19  1110   LDL 46             Passed - No abnormal creatine kinase in past 12 months     No lab results found.             Passed - Recent (12 mo) or future (30 days) visit within the authorizing provider's specialty     Patient has had an office visit with the authorizing provider or a provider within the authorizing providers department within the previous 12 mos or has a future within next 30 days. See \"Patient Info\" tab in inbasket, or \"Choose Columns\" in Meds & Orders section of the refill encounter.              Passed - Medication is active on med list        Passed - Patient is age 18 or older           PARoxetine (PAXIL) 10 MG tablet [Pharmacy Med Name: PAROXETINE HCL 10MG TABS] 90 tablet 3     Sig: TAKE ONE TABLET BY MOUTH EVERY MORNING       SSRIs Protocol Passed - 7/17/2020 10:03 AM        Passed - PHQ-9 score less than 5 in past 6 months     Please review last PHQ-9 score.           Passed - Medication is active on med list        Passed - Patient is age 18 or older        Passed - Recent (6 mo) or future (30 days) visit within the authorizing provider's specialty     Patient had office visit in the last 6 months or has a visit in the next 30 days with authorizing provider or within the authorizing provider's specialty.  See \"Patient Info\" tab in inbasket, or \"Choose Columns\" in Meds & Orders section of the refill encounter.               traZODone (DESYREL) 50 MG tablet [Pharmacy " "Med Name: TRAZODONE HCL 50MG TABS] 90 tablet 3     Sig: TAKE 1 TABLET BY MOUTH NIGHTLY AS NEEDED FOR SLEEP       Serotonin Modulators Passed - 7/17/2020 10:03 AM        Passed - Recent (12 mo) or future (30 days) visit within the authorizing provider's specialty     Patient has had an office visit with the authorizing provider or a provider within the authorizing providers department within the previous 12 mos or has a future within next 30 days. See \"Patient Info\" tab in inbasket, or \"Choose Columns\" in Meds & Orders section of the refill encounter.              Passed - Medication is active on med list        Passed - Patient is age 18 or older             "

## 2020-07-22 ENCOUNTER — MYC MEDICAL ADVICE (OUTPATIENT)
Dept: FAMILY MEDICINE | Facility: CLINIC | Age: 60
End: 2020-07-22

## 2020-07-24 DIAGNOSIS — F41.8 MIXED ANXIETY DEPRESSIVE DISORDER: Chronic | ICD-10-CM

## 2020-07-24 DIAGNOSIS — E78.5 HYPERLIPIDEMIA LDL GOAL <130: Chronic | ICD-10-CM

## 2020-07-24 DIAGNOSIS — G89.4 CHRONIC PAIN SYNDROME: Chronic | ICD-10-CM

## 2020-07-24 DIAGNOSIS — G57.72 CAUSALGIA OF LOWER LIMB, LEFT: ICD-10-CM

## 2020-07-24 DIAGNOSIS — G47.9 DISTURBANCE IN SLEEP BEHAVIOR: ICD-10-CM

## 2020-07-24 RX ORDER — TRAZODONE HYDROCHLORIDE 50 MG/1
TABLET, FILM COATED ORAL
Qty: 30 TABLET | Refills: 0 | Status: SHIPPED | OUTPATIENT
Start: 2020-07-24 | End: 2020-07-27

## 2020-07-24 RX ORDER — PAROXETINE 10 MG/1
TABLET, FILM COATED ORAL
Qty: 30 TABLET | Refills: 0 | Status: SHIPPED | OUTPATIENT
Start: 2020-07-24 | End: 2020-07-27

## 2020-07-24 RX ORDER — ATORVASTATIN CALCIUM 40 MG/1
TABLET, FILM COATED ORAL
Qty: 30 TABLET | Refills: 0 | Status: SHIPPED | OUTPATIENT
Start: 2020-07-24 | End: 2020-07-31

## 2020-07-27 ENCOUNTER — VIRTUAL VISIT (OUTPATIENT)
Dept: FAMILY MEDICINE | Facility: CLINIC | Age: 60
End: 2020-07-27
Payer: COMMERCIAL

## 2020-07-27 DIAGNOSIS — F41.8 MIXED ANXIETY DEPRESSIVE DISORDER: Primary | Chronic | ICD-10-CM

## 2020-07-27 DIAGNOSIS — K21.9 GASTROESOPHAGEAL REFLUX DISEASE, ESOPHAGITIS PRESENCE NOT SPECIFIED: ICD-10-CM

## 2020-07-27 DIAGNOSIS — E78.5 HYPERLIPIDEMIA LDL GOAL <130: Chronic | ICD-10-CM

## 2020-07-27 DIAGNOSIS — Z12.5 SCREENING FOR PROSTATE CANCER: ICD-10-CM

## 2020-07-27 DIAGNOSIS — M79.18 LUMBAR MUSCLE PAIN: ICD-10-CM

## 2020-07-27 DIAGNOSIS — G47.9 DISTURBANCE IN SLEEP BEHAVIOR: ICD-10-CM

## 2020-07-27 PROCEDURE — 99214 OFFICE O/P EST MOD 30 MIN: CPT | Mod: 95 | Performed by: NURSE PRACTITIONER

## 2020-07-27 RX ORDER — METHOCARBAMOL 750 MG/1
750 TABLET, FILM COATED ORAL 4 TIMES DAILY
Qty: 120 TABLET | Refills: 0 | Status: SHIPPED | OUTPATIENT
Start: 2020-07-27 | End: 2020-10-27

## 2020-07-27 RX ORDER — LORAZEPAM 1 MG/1
TABLET ORAL
Qty: 15 TABLET | Refills: 0 | Status: SHIPPED | OUTPATIENT
Start: 2020-07-27 | End: 2020-10-27

## 2020-07-27 RX ORDER — TRAZODONE HYDROCHLORIDE 50 MG/1
50 TABLET, FILM COATED ORAL
Qty: 30 TABLET | Refills: 0 | Status: CANCELLED | OUTPATIENT
Start: 2020-07-27

## 2020-07-27 RX ORDER — FAMOTIDINE 20 MG/1
20 TABLET, FILM COATED ORAL 2 TIMES DAILY
Qty: 180 TABLET | Refills: 3 | Status: SHIPPED | OUTPATIENT
Start: 2020-07-27 | End: 2021-08-07

## 2020-07-27 RX ORDER — TRAZODONE HYDROCHLORIDE 50 MG/1
50-100 TABLET, FILM COATED ORAL
Qty: 180 TABLET | Refills: 3 | Status: SHIPPED | OUTPATIENT
Start: 2020-07-27 | End: 2021-08-09

## 2020-07-27 RX ORDER — PAROXETINE 10 MG/1
10 TABLET, FILM COATED ORAL EVERY MORNING
Qty: 90 TABLET | Refills: 3 | Status: SHIPPED | OUTPATIENT
Start: 2020-07-27 | End: 2021-08-07

## 2020-07-27 NOTE — PATIENT INSTRUCTIONS
1.  Start pepcid 20 mg twice daily for heartburn.  If this is not covering symptoms, start prilosec once daily for 30 days and then go back to pepcid.  If pepcid is not helpful at all, go back to using Prilosec once daily and stop use of pepcid.  2.  Refills given on Paxil and lorazepam.  Follow-up in clinic if any changes in your mental health for a virtual visit.  3.  Trazodone refilled with using 1-2 tabs at bedtime so you can increase dose if needed.  Follow-up in clinic if any persistent sleep issues or in 1 year for refills.  4.  I refilled your Robaxin for the muscle spasms in the back due to recent fall.  Take as directed.  You may want to consider using a cane on the side of your foot pain in order to reduce falls.  If any worsening or persistent symptoms, please follow-up in clinic for recheck.  5.  I have placed labs for prostate screening and cholesterol.  You can make a lab appointment in October and get these completed.  You will be notified of results.

## 2020-07-27 NOTE — PROGRESS NOTES
"Mata Poe is a 60 year old male who is being evaluated via a billable telephone visit.      The patient has been notified of following:     \"This telephone visit will be conducted via a call between you and your physician/provider. We have found that certain health care needs can be provided without the need for a physical exam.  This service lets us provide the care you need with a short phone conversation.  If a prescription is necessary we can send it directly to your pharmacy.  If lab work is needed we can place an order for that and you can then stop by our lab to have the test done at a later time.    Telephone visits are billed at different rates depending on your insurance coverage. During this emergency period, for some insurers they may be billed the same as an in-person visit.  Please reach out to your insurance provider with any questions.    If during the course of the call the physician/provider feels a telephone visit is not appropriate, you will not be charged for this service.\"    Patient has given verbal consent for Telephone visit?  Yes    What phone number would you like to be contacted at? [phone]    How would you like to obtain your AVS? Essence Caruso     Mata Poe is a 60 year old male who presents via phone visit today for the following health issues:    HPI    Medication Followup of anxiety, pain    Taking Medication as prescribed: yes    Side Effects:  None    Medication Helping Symptoms:  yes     Patient calls in today seeking med refills.  He states that he takes his trazodone 50 mg which is usually helpful but occasionally will wake up in the melanite still.  He has tried increasing which seems to be a little helpful at times.  Patient also is taking Paxil and lorazepam as needed for his anxiety and depression.  He does state that this is very helpful.  He does unsure how much he uses lorazepam but feels that he goes through maybe 10 tablets a month " taking one half tab as needed.  Patient does seem to think his anxiety has gone up a little bit since COVID-19 but for the most part has been remaining the same and controlled.  Patient takes Prilosec as needed.  But is willing to try other medication if helpful.  He also like a refill on his muscle relaxant since he had a little fall on water yesterday after the rain and pulled some muscles and has some spasming.  He denied any concerning symptoms like loss of bowel or bladder function, or unable to walk on his legs without feeling that they are falling or pain shooting down the legs.  However he does have some spasms in the lower back and would like some refill on his muscle relaxant to help with this until he is healed.    Hyperlipidemia Follow-Up      Are you regularly taking any medication or supplement to lower your cholesterol?   Yes- Lipitor 40 mg daily    Are you having muscle aches or other side effects that you think could be caused by your cholesterol lowering medication?  No    Depression and Anxiety Follow-Up    How are you doing with your depression since your last visit? No change    How are you doing with your anxiety since your last visit?  No change    Are you having other symptoms that might be associated with depression or anxiety? No    Have you had a significant life event? No     Do you have any concerns with your use of alcohol or other drugs? No    Social History     Tobacco Use     Smoking status: Former Smoker     Smokeless tobacco: Never Used     Tobacco comment: quit in 1999   Substance Use Topics     Alcohol use: Yes     Comment: not while on Paxil     Drug use: Yes     Types: Marijuana     Comment: medical use     PHQ 6/13/2019 11/3/2019 4/23/2020   PHQ-9 Total Score 5 2 3   Q9: Thoughts of better off dead/self-harm past 2 weeks Not at all Not at all Not at all     JACOB-7 SCORE 10/26/2018 11/3/2019 4/23/2020   Total Score 3 (minimal anxiety) 3 (minimal anxiety) -   Total Score 3 3 7      Last PHQ-9 4/23/2020   1.  Little interest or pleasure in doing things 1   2.  Feeling down, depressed, or hopeless 1   3.  Trouble falling or staying asleep, or sleeping too much 0   4.  Feeling tired or having little energy 0   5.  Poor appetite or overeating 0   6.  Feeling bad about yourself 0   7.  Trouble concentrating 1   8.  Moving slowly or restless 0   Q9: Thoughts of better off dead/self-harm past 2 weeks 0   PHQ-9 Total Score 3   Difficulty at work, home, or with people -     JACOB-7  4/23/2020   1. Feeling nervous, anxious, or on edge 1   2. Not being able to stop or control worrying 2   3. Worrying too much about different things 1   4. Trouble relaxing 1   5. Being so restless that it is hard to sit still 1   6. Becoming easily annoyed or irritable 0   7. Feeling afraid, as if something awful might happen 1   JACOB-7 Total Score 7   If you checked any problems, how difficult have they made it for you to do your work, take care of things at home, or get along with other people? -       Patient Active Problem List   Diagnosis     Chronic pain syndrome     Allergic rhinitis     Abnormal CT scan, chest     24 hr info given     Hyperlipidemia LDL goal <130     Elevated fasting glucose     Obstructive Sleep Apnea severe (AHI=70)     Mixed anxiety depressive disorder     Benign prostatic hyperplasia with urinary obstruction     Polyp of colon     Insomnia due to medical condition     Past Surgical History:   Procedure Laterality Date     HERNIA REPAIR  1996    Right Hernia     ORTHOPEDIC SURGERY  2003    left foot       Social History     Tobacco Use     Smoking status: Former Smoker     Smokeless tobacco: Never Used     Tobacco comment: quit in 1999   Substance Use Topics     Alcohol use: Yes     Comment: not while on Paxil     Family History   Problem Relation Age of Onset     Hypertension Father      Cerebrovascular Disease Father      Cancer Father      Cancer Sister         intestinal cancer     Cancer  - colorectal Sister      Arthritis Daughter      Cancer - colorectal Sister      Cancer Mother      Diabetes No family hx of      C.A.D. No family hx of      Breast Cancer No family hx of      Prostate Cancer No family hx of          Current Outpatient Medications   Medication Sig Dispense Refill     alfuzosin ER (UROXATRAL) 10 MG 24 hr tablet Take 1 tablet (10 mg) by mouth daily 90 tablet 3     atorvastatin (LIPITOR) 40 MG tablet TAKE ONE TABLET BY MOUTH ONCE DAILY 30 tablet 0     brimonidine (ALPHAGAN) 0.2 % ophthalmic solution        famotidine (PEPCID) 20 MG tablet Take 1 tablet (20 mg) by mouth 2 times daily 180 tablet 3     latanoprost (XALATAN) 0.005 % ophthalmic solution        LORazepam (ATIVAN) 1 MG tablet TAKE ONE-HALF TABLET BY MOUTH EVERY 8 HOURS AS NEEDED FOR ANXIETY 15 tablet 0     methocarbamol (ROBAXIN) 750 MG tablet Take 1 tablet (750 mg) by mouth 4 times daily 120 tablet 0     omeprazole (PRILOSEC) 20 MG DR capsule Take 1 capsule (20 mg) by mouth daily 30 capsule 11     PARoxetine (PAXIL) 10 MG tablet Take 1 tablet (10 mg) by mouth every morning 90 tablet 3     traZODone (DESYREL) 50 MG tablet Take 1-2 tablets ( mg) by mouth nightly as needed for sleep 180 tablet 3     ORDER FOR [facility] Respironics REMSTAR 60 Series Auto CPAP 7cm H2O, Airfit P10 nasal pillow mask w/medium pillows       sildenafil (REVATIO) 20 MG tablet Take 20 mg by mouth as needed       Allergies   Allergen Reactions     Lyrica [Pregabalin]      Felt really edgy and anxious on Lyrica 25mg dosing     BP Readings from Last 3 Encounters:   09/10/19 124/89   06/27/19 122/78   06/13/19 120/72    Wt Readings from Last 3 Encounters:   09/10/19 100.2 kg (221 lb)   06/27/19 99.4 kg (219 lb 3.2 oz)   06/13/19 98.5 kg (217 lb 3.2 oz)                    Reviewed and updated as needed this visit by Provider  Tobacco  Allergies  Meds  Problems  Med Hx  Surg Hx  Fam Hx         Review of Systems   CONSTITUTIONAL: NEGATIVE for fever,  chills, change in weight  ENT/MOUTH: NEGATIVE for ear, mouth and throat problems  RESP: NEGATIVE for significant cough or SOB  CV: NEGATIVE for chest pain, palpitations or peripheral edema  PSYCHIATRIC: NEGATIVE for changes in mood or affect  ROS otherwise negative       Objective   Reported vitals:  There were no vitals taken for this visit.   healthy, alert and no distress  PSYCH: Alert and oriented times 3; coherent speech, normal   rate and volume, able to articulate logical thoughts, able   to abstract reason, no tangential thoughts, no hallucinations   or delusions  His affect is normal  RESP: No cough, no audible wheezing, able to talk in full sentences  Remainder of exam unable to be completed due to telephone visits    Diagnostic Test Results:  Labs reviewed in Epic        Assessment/Plan:    1. Mixed anxiety depressive disorder  Stable on medication.  Reordered Paxil for 1 year and limited Ativan given as needed.  Follow-up in 1 year for refills, okay to refill ativan as needed no more than 15 tabs per month.  If increase use of ativan or change in mental status, I recommend follow-up in clinic prior to one year.  - LORazepam (ATIVAN) 1 MG tablet; TAKE ONE-HALF TABLET BY MOUTH EVERY 8 HOURS AS NEEDED FOR ANXIETY  Dispense: 15 tablet; Refill: 0  - PARoxetine (PAXIL) 10 MG tablet; Take 1 tablet (10 mg) by mouth every morning  Dispense: 90 tablet; Refill: 3    2. Gastroesophageal reflux disease, esophagitis presence not specified  Discussed long term side effects of prilosec and patient is using this off and on as needed.  Recommended pepcid use twice daily to keep symptoms controlled and filled omeprazole to use for 1 month at a time as needed if persistent symptoms despite pepcid use.  - omeprazole (PRILOSEC) 20 MG DR capsule; Take 1 capsule (20 mg) by mouth daily  Dispense: 30 capsule; Refill: 11  - famotidine (PEPCID) 20 MG tablet; Take 1 tablet (20 mg) by mouth 2 times daily  Dispense: 180 tablet;  Refill: 3    3. Disturbance in sleep behavior  He does have some nights that his medication does not seem to be helpful.  He can increase dose if he has a night where he did not get good sleep to 2 tabs (100mg) as needed.  Refill given with 1-2 tabs as needed for sleep.  Follow-up in 1 year for refills or sooner if not able to get sleep despite treatment.  - traZODone (DESYREL) 50 MG tablet; Take 1-2 tablets ( mg) by mouth nightly as needed for sleep  Dispense: 180 tablet; Refill: 3    4. Lumbar muscle pain  Due to fall he pulled some muscles in the back and treating with Robaxin as needed for spasms.  No concerning symptoms at this time but recommend that he follow-up as needed.  Recommended using cane to help support weight over pain in foot to reduce falls.  - methocarbamol (ROBAXIN) 750 MG tablet; Take 1 tablet (750 mg) by mouth 4 times daily  Dispense: 120 tablet; Refill: 0    5. Hyperlipidemia LDL goal <130  Due for labs in October.  I futured a lab and he can make an appointment for lab testing at that time.  Patient will call to make appointment and will be notified of results.  - Lipid panel reflex to direct LDL Fasting; Future    6. Screening for prostate cancer  Screening test due in October.  Ordered future lab and he will make appointment at that time for testing.  He will be notified of results.  - **Prostate spec antigen screen FUTURE anytime; Future    Return in about 1 year (around 7/27/2021) for In-Clinic Visit, or Video Visit. for medication refills.    Phone call duration:  20 minutes    Kathia Mart NP

## 2020-07-29 RX ORDER — LORAZEPAM 1 MG/1
TABLET ORAL
Qty: 15 TABLET | Refills: 0 | OUTPATIENT
Start: 2020-07-29

## 2020-07-30 ENCOUNTER — MYC MEDICAL ADVICE (OUTPATIENT)
Dept: FAMILY MEDICINE | Facility: CLINIC | Age: 60
End: 2020-07-30

## 2020-07-30 DIAGNOSIS — E78.5 HYPERLIPIDEMIA LDL GOAL <130: Chronic | ICD-10-CM

## 2020-07-31 RX ORDER — ATORVASTATIN CALCIUM 40 MG/1
TABLET, FILM COATED ORAL
Qty: 30 TABLET | Refills: 0 | OUTPATIENT
Start: 2020-07-31

## 2020-07-31 RX ORDER — ATORVASTATIN CALCIUM 40 MG/1
40 TABLET, FILM COATED ORAL DAILY
Qty: 90 TABLET | Refills: 0 | Status: SHIPPED | OUTPATIENT
Start: 2020-07-31 | End: 2020-12-07

## 2020-07-31 NOTE — TELEPHONE ENCOUNTER
Trazodone was written for a 90 day supply. Atorvastatin prescription pended please fax to pharmacy of patient's choice.

## 2020-07-31 NOTE — TELEPHONE ENCOUNTER
atorvastatin (LIPITOR) 40 MG tablet  30 tablet  0  7/24/2020   No    Sig: TAKE ONE TABLET BY MOUTH ONCE DAILY    Sent to pharmacy as: Atorvastatin Calcium 40 MG Oral Tablet (LIPITOR)    Class: E-Prescribe    Order: [id number]    E-Prescribing Status: Receipt confirmed by pharmacy (7/24/2020  3:56 PM CDT)
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
## 2020-08-05 ENCOUNTER — VIRTUAL VISIT (OUTPATIENT)
Dept: PALLIATIVE MEDICINE | Facility: CLINIC | Age: 60
End: 2020-08-05
Payer: COMMERCIAL

## 2020-08-05 ENCOUNTER — TELEPHONE (OUTPATIENT)
Dept: PALLIATIVE MEDICINE | Facility: CLINIC | Age: 60
End: 2020-08-05

## 2020-08-05 DIAGNOSIS — G90.522 COMPLEX REGIONAL PAIN SYNDROME TYPE 1 OF LEFT LOWER EXTREMITY: ICD-10-CM

## 2020-08-05 DIAGNOSIS — M79.2 NEUROPATHIC PAIN: Primary | ICD-10-CM

## 2020-08-05 PROCEDURE — 99213 OFFICE O/P EST LOW 20 MIN: CPT | Mod: 95 | Performed by: NURSE PRACTITIONER

## 2020-08-05 ASSESSMENT — PAIN SCALES - GENERAL: PAINLEVEL: EXTREME PAIN (8)

## 2020-08-05 NOTE — TELEPHONE ENCOUNTER
MARIBELLM to schedule 12 week Video using [employer]    [facility]

## 2020-08-05 NOTE — PATIENT INSTRUCTIONS
Plan:   1. Physical Therapy:  None at present time  2. Clinical Health Psychologist: Zac is working with an outside therapist with EMDR, I am in support of this  3. Diagnostic Studies:  none  4. Medication Management:    1. None at present  2. Interested in use of CBD from hemp as used to get relief from [place] Medical Cann[place] program but now lives in [place]  1. I recommend looking into the online companies of [email or web address] or [employer] or [employer] (this is a [place] company)  5. Further procedures recommended:none  6. Recommendations to PCP: see above  7. Follow up: 12 week video using [employer]

## 2020-08-17 ENCOUNTER — MYC MEDICAL ADVICE (OUTPATIENT)
Dept: FAMILY MEDICINE | Facility: CLINIC | Age: 60
End: 2020-08-17

## 2020-08-17 DIAGNOSIS — R73.09 ABNORMAL GLUCOSE: ICD-10-CM

## 2020-08-17 DIAGNOSIS — E78.5 HYPERLIPIDEMIA LDL GOAL <130: Primary | ICD-10-CM

## 2020-08-17 NOTE — TELEPHONE ENCOUNTER
Pt has Lipid and PSA lab orders in place.  Pt requesting to check his liver.  CMP order placed.  Caity

## 2020-09-02 NOTE — TELEPHONE ENCOUNTER
Last order by Dr. Horne 4/20/20  
Letter sent.  Kristy MOORE RN BSN    
Refilled x 1, needs controlled substance agreement on file or in problem list.  Appears to be a chronic medication.     Follow up with PCP.    Lauren Frederick M.D.    
Requested Prescriptions   Pending Prescriptions Disp Refills     LORazepam (ATIVAN) 1 MG tablet [Pharmacy Med Name: LORAZEPAM 1MG TABS] 15 tablet 0     Sig: TAKE 0.5 TABLETS (0.5 MG) BY MOUTH EVERY 8 HOURS AS NEEDED FOR ANXIETY       There is no refill protocol information for this order              Last Written Prescription Date:  4/20/2020  Last Fill Quantity: 15,   # refills: 0  Last Office Visit: 6/27/2019 with [facility]   Future Office visit:       Routing refill request to provider for review/approval because:  Drug not on the [facility], [facility] or [facility] refill protocol or controlled substance    
115

## 2020-09-04 ENCOUNTER — ALLIED HEALTH/NURSE VISIT (OUTPATIENT)
Dept: FAMILY MEDICINE | Facility: CLINIC | Age: 60
End: 2020-09-04
Payer: COMMERCIAL

## 2020-09-04 DIAGNOSIS — Z23 NEED FOR PROPHYLACTIC VACCINATION AND INOCULATION AGAINST INFLUENZA: Primary | ICD-10-CM

## 2020-09-04 DIAGNOSIS — E78.5 HYPERLIPIDEMIA LDL GOAL <130: ICD-10-CM

## 2020-09-04 DIAGNOSIS — Z12.5 SCREENING FOR PROSTATE CANCER: ICD-10-CM

## 2020-09-04 DIAGNOSIS — R73.09 ABNORMAL GLUCOSE: ICD-10-CM

## 2020-09-04 LAB
ALBUMIN SERPL-MCNC: 3.5 G/DL (ref 3.4–5)
ALP SERPL-CCNC: 85 U/L (ref 40–150)
ALT SERPL W P-5'-P-CCNC: 23 U/L (ref 0–70)
ANION GAP SERPL CALCULATED.3IONS-SCNC: 4 MMOL/L (ref 3–14)
AST SERPL W P-5'-P-CCNC: 23 U/L (ref 0–45)
BILIRUB SERPL-MCNC: 1 MG/DL (ref 0.2–1.3)
BUN SERPL-MCNC: 17 MG/DL (ref 7–30)
CALCIUM SERPL-MCNC: 8.8 MG/DL (ref 8.5–10.1)
CHLORIDE SERPL-SCNC: 104 MMOL/L (ref 94–109)
CHOLEST SERPL-MCNC: 130 MG/DL
CO2 SERPL-SCNC: 28 MMOL/L (ref 20–32)
CREAT SERPL-MCNC: 0.92 MG/DL (ref 0.66–1.25)
GFR SERPL CREATININE-BSD FRML MDRD: 89 ML/MIN/{1.73_M2}
GLUCOSE SERPL-MCNC: 119 MG/DL (ref 70–99)
HDLC SERPL-MCNC: 40 MG/DL
LDLC SERPL CALC-MCNC: 36 MG/DL
NONHDLC SERPL-MCNC: 90 MG/DL
POTASSIUM SERPL-SCNC: 4 MMOL/L (ref 3.4–5.3)
PROT SERPL-MCNC: 7.3 G/DL (ref 6.8–8.8)
PSA SERPL-ACNC: 3 UG/L (ref 0–4)
SODIUM SERPL-SCNC: 136 MMOL/L (ref 133–144)
TRIGL SERPL-MCNC: 271 MG/DL

## 2020-09-04 PROCEDURE — G0103 PSA SCREENING: HCPCS | Performed by: FAMILY MEDICINE

## 2020-09-04 PROCEDURE — 90471 IMMUNIZATION ADMIN: CPT

## 2020-09-04 PROCEDURE — 99207 ZZC NO CHARGE NURSE ONLY: CPT

## 2020-09-04 PROCEDURE — 80061 LIPID PANEL: CPT | Performed by: FAMILY MEDICINE

## 2020-09-04 PROCEDURE — 36415 COLL VENOUS BLD VENIPUNCTURE: CPT | Performed by: FAMILY MEDICINE

## 2020-09-04 PROCEDURE — 80053 COMPREHEN METABOLIC PANEL: CPT | Performed by: FAMILY MEDICINE

## 2020-09-04 PROCEDURE — 90682 RIV4 VACC RECOMBINANT DNA IM: CPT

## 2020-09-18 DIAGNOSIS — G47.9 DISTURBANCE IN SLEEP BEHAVIOR: ICD-10-CM

## 2020-09-19 DIAGNOSIS — E78.5 HYPERLIPIDEMIA LDL GOAL <130: Chronic | ICD-10-CM

## 2020-09-21 RX ORDER — TRAZODONE HYDROCHLORIDE 50 MG/1
TABLET, FILM COATED ORAL
Qty: 30 TABLET | Refills: 0 | OUTPATIENT
Start: 2020-09-21

## 2020-09-21 RX ORDER — ATORVASTATIN CALCIUM 40 MG/1
TABLET, FILM COATED ORAL
Qty: 90 TABLET | Refills: 0 | OUTPATIENT
Start: 2020-09-21

## 2020-09-21 NOTE — TELEPHONE ENCOUNTER
"Requested Prescriptions   Pending Prescriptions Disp Refills     atorvastatin (LIPITOR) 40 MG tablet [Pharmacy Med Name: ATORVASTATIN CALCIUM 40MG TABS] 90 tablet 0     Sig: TAKE ONE TABLET BY MOUTH ONCE DAILY       Statins Protocol Passed - 9/19/2020  9:57 AM        Passed - LDL on file in past 12 months     Recent Labs   Lab Test 09/04/20  1048   LDL 36             Passed - No abnormal creatine kinase in past 12 months     No lab results found.             Passed - Recent (12 mo) or future (30 days) visit within the authorizing provider's specialty     Patient has had an office visit with the authorizing provider or a provider within the authorizing providers department within the previous 12 mos or has a future within next 30 days. See \"Patient Info\" tab in inbasket, or \"Choose Columns\" in Meds & Orders section of the refill encounter.              Passed - Medication is active on med list        Passed - Patient is age 18 or older             "

## 2020-10-26 DIAGNOSIS — F41.8 MIXED ANXIETY DEPRESSIVE DISORDER: Chronic | ICD-10-CM

## 2020-10-26 DIAGNOSIS — M79.18 LUMBAR MUSCLE PAIN: ICD-10-CM

## 2020-10-26 NOTE — TELEPHONE ENCOUNTER
Requested Prescriptions   Pending Prescriptions Disp Refills     methocarbamol (ROBAXIN) 750 MG tablet 120 tablet 0     Sig: Take 1 tablet (750 mg) by mouth 4 times daily       There is no refill protocol information for this order        LORazepam (ATIVAN) 1 MG tablet 15 tablet 0     Sig: TAKE ONE-HALF TABLET BY MOUTH EVERY 8 HOURS AS NEEDED FOR ANXIETY       There is no refill protocol information for this order

## 2020-10-27 RX ORDER — METHOCARBAMOL 750 MG/1
750 TABLET, FILM COATED ORAL 4 TIMES DAILY
Qty: 120 TABLET | Refills: 0 | Status: SHIPPED | OUTPATIENT
Start: 2020-10-27 | End: 2020-12-08

## 2020-10-27 RX ORDER — LORAZEPAM 1 MG/1
TABLET ORAL
Qty: 15 TABLET | Refills: 0 | Status: SHIPPED | OUTPATIENT
Start: 2020-10-27 | End: 2020-12-11

## 2020-10-27 NOTE — TELEPHONE ENCOUNTER
Routing refill request to provider for review/approval because:  Drug not on the [facility] refill protocol      SUSY HernandezN, RN

## 2020-10-28 ENCOUNTER — VIRTUAL VISIT (OUTPATIENT)
Dept: PALLIATIVE MEDICINE | Facility: CLINIC | Age: 60
End: 2020-10-28
Payer: COMMERCIAL

## 2020-10-28 DIAGNOSIS — M79.2 NEUROPATHIC PAIN: Primary | ICD-10-CM

## 2020-10-28 DIAGNOSIS — G90.522 COMPLEX REGIONAL PAIN SYNDROME TYPE 1 OF LEFT LOWER EXTREMITY: ICD-10-CM

## 2020-10-28 PROCEDURE — 99213 OFFICE O/P EST LOW 20 MIN: CPT | Mod: 95 | Performed by: NURSE PRACTITIONER

## 2020-10-28 ASSESSMENT — PAIN SCALES - GENERAL: PAINLEVEL: MODERATE PAIN (5)
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
## 2020-11-16 ENCOUNTER — HEALTH MAINTENANCE LETTER (OUTPATIENT)
Age: 60
End: 2020-11-16

## 2020-12-05 DIAGNOSIS — F41.8 MIXED ANXIETY DEPRESSIVE DISORDER: Chronic | ICD-10-CM

## 2020-12-05 DIAGNOSIS — M79.18 LUMBAR MUSCLE PAIN: ICD-10-CM

## 2020-12-05 DIAGNOSIS — E78.5 HYPERLIPIDEMIA LDL GOAL <130: Chronic | ICD-10-CM

## 2020-12-07 RX ORDER — ATORVASTATIN CALCIUM 40 MG/1
TABLET, FILM COATED ORAL
Qty: 90 TABLET | Refills: 0 | Status: SHIPPED | OUTPATIENT
Start: 2020-12-07 | End: 2020-12-11

## 2020-12-07 NOTE — TELEPHONE ENCOUNTER
"  Routing refill request to provider for review/approval because:  Drug not on the [facility], [facility] or [facility] refill protocol or controlled substance    Prescription for atorvastatin approved per [facility] Refill Protocol.  Kala Malloy RN    Requested Prescriptions   Pending Prescriptions Disp Refills     methocarbamol (ROBAXIN) 750 MG tablet [Pharmacy Med Name: METHOCARBAMOL 750MG TABS] 120 tablet 0     Sig: TAKE ONE TABLET BY MOUTH FOUR TIMES A DAY       There is no refill protocol information for this order        atorvastatin (LIPITOR) 40 MG tablet [Pharmacy Med Name: ATORVASTATIN CALCIUM 40MG TABS] 90 tablet 0     Sig: TAKE ONE TABLET BY MOUTH ONCE DAILY       Statins Protocol Passed - 12/5/2020  3:08 PM        Passed - LDL on file in past 12 months     Recent Labs   Lab Test 09/04/20  1048   LDL 36             Passed - No abnormal creatine kinase in past 12 months     No lab results found.             Passed - Recent (12 mo) or future (30 days) visit within the authorizing provider's specialty     Patient has had an office visit with the authorizing provider or a provider within the authorizing providers department within the previous 12 mos or has a future within next 30 days. See \"Patient Info\" tab in inbasket, or \"Choose Columns\" in Meds & Orders section of the refill encounter.              Passed - Medication is active on med list        Passed - Patient is age 18 or older             "

## 2020-12-07 NOTE — TELEPHONE ENCOUNTER
Routing refill request to provider for review/approval because:  Drug not on the [facility] refill protocol     Kala Malloy RN

## 2020-12-08 ENCOUNTER — MYC MEDICAL ADVICE (OUTPATIENT)
Dept: FAMILY MEDICINE | Facility: CLINIC | Age: 60
End: 2020-12-08

## 2020-12-08 RX ORDER — LORAZEPAM 1 MG/1
TABLET ORAL
Qty: 15 TABLET | Refills: 0 | OUTPATIENT
Start: 2020-12-08

## 2020-12-08 RX ORDER — METHOCARBAMOL 750 MG/1
TABLET, FILM COATED ORAL
Qty: 120 TABLET | Refills: 0 | Status: SHIPPED | OUTPATIENT
Start: 2020-12-08 | End: 2020-12-11

## 2020-12-08 NOTE — TELEPHONE ENCOUNTER
Patient has not been physically seen in clinic since 6.27.19 for a pre-op. He does have another phone visit on 12.11.20. Routed to provider.  Kala Malloy RN

## 2020-12-08 NOTE — TELEPHONE ENCOUNTER
Asthma seen physically by me 6/2019. Las virtual visit 4/2020. Need virtual visit or office visit prior to additional refills.

## 2020-12-11 ENCOUNTER — VIRTUAL VISIT (OUTPATIENT)
Dept: FAMILY MEDICINE | Facility: CLINIC | Age: 60
End: 2020-12-11
Payer: COMMERCIAL

## 2020-12-11 DIAGNOSIS — E78.5 HYPERLIPIDEMIA LDL GOAL <130: Chronic | ICD-10-CM

## 2020-12-11 DIAGNOSIS — F41.8 MIXED ANXIETY DEPRESSIVE DISORDER: Chronic | ICD-10-CM

## 2020-12-11 DIAGNOSIS — M79.18 LUMBAR MUSCLE PAIN: ICD-10-CM

## 2020-12-11 PROBLEM — H01.02A SQUAMOUS BLEPHARITIS OF UPPER AND LOWER EYELIDS OF BOTH EYES: Status: ACTIVE | Noted: 2019-08-16

## 2020-12-11 PROBLEM — R33.9 INCOMPLETE BLADDER EMPTYING: Status: ACTIVE | Noted: 2017-01-31

## 2020-12-11 PROBLEM — H01.02B SQUAMOUS BLEPHARITIS OF UPPER AND LOWER EYELIDS OF BOTH EYES: Status: ACTIVE | Noted: 2019-08-16

## 2020-12-11 PROBLEM — Z96.1 PSEUDOPHAKIA, BOTH EYES: Status: ACTIVE | Noted: 2019-07-15

## 2020-12-11 PROBLEM — H40.1132 PRIMARY OPEN ANGLE GLAUCOMA (POAG) OF BOTH EYES, MODERATE STAGE: Status: ACTIVE | Noted: 2019-07-15

## 2020-12-11 PROCEDURE — 99214 OFFICE O/P EST MOD 30 MIN: CPT | Mod: 95 | Performed by: NURSE PRACTITIONER

## 2020-12-11 PROCEDURE — 96127 BRIEF EMOTIONAL/BEHAV ASSMT: CPT | Mod: 59 | Performed by: NURSE PRACTITIONER

## 2020-12-11 RX ORDER — ATORVASTATIN CALCIUM 40 MG/1
40 TABLET, FILM COATED ORAL DAILY
Qty: 90 TABLET | Refills: 2 | Status: SHIPPED | OUTPATIENT
Start: 2020-12-11 | End: 2021-12-29

## 2020-12-11 RX ORDER — METHOCARBAMOL 750 MG/1
TABLET, FILM COATED ORAL
Qty: 120 TABLET | Refills: 1 | Status: SHIPPED | OUTPATIENT
Start: 2020-12-11 | End: 2021-03-16

## 2020-12-11 RX ORDER — LORAZEPAM 1 MG/1
TABLET ORAL
Qty: 15 TABLET | Refills: 0 | Status: SHIPPED | OUTPATIENT
Start: 2020-12-11 | End: 2021-02-03

## 2020-12-11 ASSESSMENT — ANXIETY QUESTIONNAIRES
2. NOT BEING ABLE TO STOP OR CONTROL WORRYING: SEVERAL DAYS
7. FEELING AFRAID AS IF SOMETHING AWFUL MIGHT HAPPEN: SEVERAL DAYS
1. FEELING NERVOUS, ANXIOUS, OR ON EDGE: MORE THAN HALF THE DAYS
3. WORRYING TOO MUCH ABOUT DIFFERENT THINGS: NOT AT ALL
5. BEING SO RESTLESS THAT IT IS HARD TO SIT STILL: NOT AT ALL
IF YOU CHECKED OFF ANY PROBLEMS ON THIS QUESTIONNAIRE, HOW DIFFICULT HAVE THESE PROBLEMS MADE IT FOR YOU TO DO YOUR WORK, TAKE CARE OF THINGS AT HOME, OR GET ALONG WITH OTHER PEOPLE: SOMEWHAT DIFFICULT
6. BECOMING EASILY ANNOYED OR IRRITABLE: NOT AT ALL
GAD7 TOTAL SCORE: 4

## 2020-12-11 ASSESSMENT — PATIENT HEALTH QUESTIONNAIRE - PHQ9
5. POOR APPETITE OR OVEREATING: NOT AT ALL
SUM OF ALL RESPONSES TO PHQ QUESTIONS 1-9: 4

## 2020-12-11 NOTE — PATIENT INSTRUCTIONS
1.  Refill given on cholesterol medication.  Follow-up in September 2021 with Dr. Sears for medication refill and labs.  2.  Refill given on Robaxin as needed for your back.  3.  Refill given on lorazepam for anxiety breakthrough as needed.  I recommend follow-up with Dr. Sears for ongoing refills.

## 2020-12-11 NOTE — PROGRESS NOTES
Subjective     Mata Poe is a 60 year old male who presents to clinic today for the following health issues:    HPI         {SUPERLIST (Optional):931360}  {additonal problems for provider to add (Optional):088187}    Review of Systems   {ROS COMP (Optional):094269}      Objective    There were no vitals taken for this visit.  There is no height or weight on file to calculate BMI.  Physical Exam   {Exam List (Optional):708080}    {Diagnostic Test Results (Optional):354523}        {PROVIDER CHARTING PREFERENCE:152838}

## 2020-12-11 NOTE — PROGRESS NOTES
"Mata Poe is a 60 year old male who is being evaluated via a billable telephone visit.      The patient has been notified of following:     \"This telephone visit will be conducted via a call between you and your physician/provider. We have found that certain health care needs can be provided without the need for a physical exam.  This service lets us provide the care you need with a short phone conversation.  If a prescription is necessary we can send it directly to your pharmacy.  If lab work is needed we can place an order for that and you can then stop by our lab to have the test done at a later time.    Telephone visits are billed at different rates depending on your insurance coverage. During this emergency period, for some insurers they may be billed the same as an in-person visit.  Please reach out to your insurance provider with any questions.    If during the course of the call the physician/provider feels a telephone visit is not appropriate, you will not be charged for this service.\"    Patient has given verbal consent for Telephone visit?  Yes    What phone number would you like to be contacted at? [phone]    How would you like to obtain your AVS? Essence    Mata Poe is a 60 year old male who presents via phone visit today for the following health issues:    HPI     Hyperlipidemia Follow-Up      Are you regularly taking any medication or supplement to lower your cholesterol?   Yes- Atorvastatin    Are you having muscle aches or other side effects that you think could be caused by your cholesterol lowering medication?  No    Depression and Anxiety Follow-Up    How are you doing with your depression since your last visit? No change    How are you doing with your anxiety since your last visit?  No change    Are you having other symptoms that might be associated with depression or anxiety? Yes:  Waking at night. He takes 1/2 tab of lorazepam to help go back to sleep.    Have you " had a significant life event? No     Do you have any concerns with your use of alcohol or other drugs? No     Taking 50 mg of Trazodone at night.  When he takes 100 mg he feels sleepy in the morning.  Using Lorazepam 1/2 tab 3 nights a week to go back to sleep if waking up with anxiety or to get to bed with anxiety.  Occasionally during the day he will use it also.  Mostly with back spasms.    Back spasms kicking back up.  Met with Jenna Abebe and talked about this and thought this had something to do with the way he is walking.  Rest and ice helps.  Comes and goes.  Robaxin also helps a lot.  Occasional lorazepam if anxiety with back spasms.    Social History     Tobacco Use     Smoking status: Former Smoker     Packs/day: 1.00     Years: 15.00     Pack years: 15.00     Types: Cigarettes     Smokeless tobacco: Never Used     Tobacco comment: quit in 1999   Substance Use Topics     Alcohol use: Not Currently     Comment: not while on Paxil     Drug use: Yes     Types: Marijuana     Comment: medical use     PHQ 11/3/2019 4/23/2020 12/11/2020   PHQ-9 Total Score 2 3 4   Q9: Thoughts of better off dead/self-harm past 2 weeks Not at all Not at all Not at all     JACOB-7 SCORE 11/3/2019 4/23/2020 12/11/2020   Total Score 3 (minimal anxiety) - -   Total Score 3 7 4     Last PHQ-9 12/11/2020   1.  Little interest or pleasure in doing things 1   2.  Feeling down, depressed, or hopeless 0   3.  Trouble falling or staying asleep, or sleeping too much 2   4.  Feeling tired or having little energy 0   5.  Poor appetite or overeating 0   6.  Feeling bad about yourself 0   7.  Trouble concentrating 1   8.  Moving slowly or restless 0   Q9: Thoughts of better off dead/self-harm past 2 weeks 0   PHQ-9 Total Score 4   Difficulty at work, home, or with people Somewhat difficult     JACOB-7  12/11/2020   1. Feeling nervous, anxious, or on edge 2   2. Not being able to stop or control worrying 1   3. Worrying too much about different  things 0   4. Trouble relaxing 0   5. Being so restless that it is hard to sit still 0   6. Becoming easily annoyed or irritable 0   7. Feeling afraid, as if something awful might happen 1   JACOB-7 Total Score 4   If you checked any problems, how difficult have they made it for you to do your work, take care of things at home, or get along with other people? Somewhat difficult       Suicide Assessment Five-step Evaluation and Treatment (SAFE-T)      How many servings of fruits and vegetables do you eat daily?  2-3    On average, how many sweetened beverages do you drink each day (Examples: soda, juice, sweet tea, etc.  Do NOT count diet or artificially sweetened beverages)?   0    How many days per week do you exercise enough to make your heart beat faster? 3 or less    How many minutes a day do you exercise enough to make your heart beat faster? 9 or less    How many days per week do you miss taking your medication? 0      Review of Systems   CONSTITUTIONAL: NEGATIVE for fever, chills, change in weight  ENT/MOUTH: NEGATIVE for ear, mouth and throat problems  RESP: NEGATIVE for significant cough or SOB  CV: NEGATIVE for chest pain, palpitations or peripheral edema  MS:  Intermittent back spasms  PSYCHIATRIC: POSITIVE for HX anxiety and HX depression  ROS otherwise negative       Objective          Vitals:  No vitals were obtained today due to virtual visit.    healthy, alert and no distress  PSYCH: Alert and oriented times 3; coherent speech, normal   rate and volume, able to articulate logical thoughts, able   to abstract reason, no tangential thoughts, no hallucinations   or delusions  His affect is normal  RESP: No cough, no audible wheezing, able to talk in full sentences  Remainder of exam unable to be completed due to telephone visits        Assessment/Plan:    Assessment & Plan     Hyperlipidemia LDL goal <130  Lipids completed in 9/20 and were improved.  Refill given on medication and recommend follow-up in  9/2021.  - atorvastatin (LIPITOR) 40 MG tablet; Take 1 tablet (40 mg) by mouth daily    Mixed anxiety depressive disorder  Stable with occasional anxiety.  PDMP shows last fill of 15 tabs of lorazepam in the end of October.  With limited use, will refill again for #15.  Recommend follow-up with PCP for ongoing refills.  - LORazepam (ATIVAN) 1 MG tablet; TAKE ONE-HALF TABLET BY MOUTH EVERY 8 HOURS AS NEEDED FOR ANXIETY    Lumbar muscle pain  Stable on medication as needed.  Refill given on Robaxin.  - methocarbamol (ROBAXIN) 750 MG tablet; TAKE ONE to Two TABLETs BY MOUTH FOUR TIMES A DAY as needed        See Patient Instructions    Return in about 9 months (around 9/11/2021) for Follow up for medication refill and labs.    Kathia Mart NP  [facility]    Phone call duration:  12 minutes

## 2020-12-12 ASSESSMENT — ANXIETY QUESTIONNAIRES: GAD7 TOTAL SCORE: 4

## 2021-01-15 ENCOUNTER — HEALTH MAINTENANCE LETTER (OUTPATIENT)
Age: 61
End: 2021-01-15

## 2021-02-01 DIAGNOSIS — F41.8 MIXED ANXIETY DEPRESSIVE DISORDER: Chronic | ICD-10-CM

## 2021-02-01 NOTE — TELEPHONE ENCOUNTER
Requested Prescriptions   Pending Prescriptions Disp Refills     LORazepam (ATIVAN) 1 MG tablet 15 tablet 0     Sig: TAKE ONE-HALF TABLET BY MOUTH EVERY 8 HOURS AS NEEDED FOR ANXIETY       There is no refill protocol information for this order

## 2021-02-03 RX ORDER — LORAZEPAM 1 MG/1
TABLET ORAL
Qty: 15 TABLET | Refills: 0 | Status: SHIPPED | OUTPATIENT
Start: 2021-02-03 | End: 2021-03-25

## 2021-03-16 ENCOUNTER — VIRTUAL VISIT (OUTPATIENT)
Dept: PALLIATIVE MEDICINE | Facility: CLINIC | Age: 61
End: 2021-03-16
Payer: COMMERCIAL

## 2021-03-16 DIAGNOSIS — M25.511 CHRONIC RIGHT SHOULDER PAIN: ICD-10-CM

## 2021-03-16 DIAGNOSIS — M47.816 SPONDYLOSIS OF LUMBAR REGION WITHOUT MYELOPATHY OR RADICULOPATHY: ICD-10-CM

## 2021-03-16 DIAGNOSIS — M54.59 LUMBAR FACET JOINT PAIN: Primary | ICD-10-CM

## 2021-03-16 DIAGNOSIS — M79.2 NEUROPATHIC PAIN: ICD-10-CM

## 2021-03-16 DIAGNOSIS — G89.29 CHRONIC RIGHT SHOULDER PAIN: ICD-10-CM

## 2021-03-16 DIAGNOSIS — G89.29 CHRONIC BILATERAL LOW BACK PAIN WITHOUT SCIATICA: ICD-10-CM

## 2021-03-16 DIAGNOSIS — M79.18 LUMBAR MUSCLE PAIN: ICD-10-CM

## 2021-03-16 DIAGNOSIS — M54.50 CHRONIC BILATERAL LOW BACK PAIN WITHOUT SCIATICA: ICD-10-CM

## 2021-03-16 DIAGNOSIS — G90.522 COMPLEX REGIONAL PAIN SYNDROME TYPE 1 OF LEFT LOWER EXTREMITY: ICD-10-CM

## 2021-03-16 PROCEDURE — 99214 OFFICE O/P EST MOD 30 MIN: CPT | Mod: 95 | Performed by: NURSE PRACTITIONER

## 2021-03-16 RX ORDER — METHOCARBAMOL 750 MG/1
TABLET, FILM COATED ORAL
Qty: 120 TABLET | Refills: 1 | Status: SHIPPED | OUTPATIENT
Start: 2021-03-16 | End: 2021-05-25
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
## 2021-03-25 ENCOUNTER — TELEPHONE (OUTPATIENT)
Dept: FAMILY MEDICINE | Facility: CLINIC | Age: 61
End: 2021-03-25

## 2021-03-25 DIAGNOSIS — F41.8 MIXED ANXIETY DEPRESSIVE DISORDER: Chronic | ICD-10-CM

## 2021-03-25 RX ORDER — LORAZEPAM 1 MG/1
TABLET ORAL
Qty: 15 TABLET | Refills: 0 | Status: SHIPPED | OUTPATIENT
Start: 2021-03-25 | End: 2021-05-26

## 2021-03-25 NOTE — LETTER
[facility]  [address] [address] [address]  [phone]        03/25/21    Mata Poe    [address] [place] [address]        Dear Mata Poe       APPOINTMENT REMINDER:   Our records indicates that it is time for you to be seen for a recheck .     Your current medication request will be approved for one refill but you will need to be seen before any additional refills can be approved.  Please make an appointment with me (schedule soon as my schedule fills up quickly).    Taking care of your health is important to us, and ongoing visits with your provider are vital to your care.    We look forward to seeing you in the near future.  You may call our office at [phone] to schedule a visit.    Please disregard this notice if you have already made an appointment.          Sincerely,        Dr. Parrish Sears/tila

## 2021-03-25 NOTE — TELEPHONE ENCOUNTER
Refilled x 1.  Review of PDMP shows that this controlled substance has been refilled by multiple different physicians/providers over the past year.  Patient needs a follow up with PCP to discuss and have a controlled substance agreement in place.    Lauren Frederick M.D.

## 2021-04-10 NOTE — PATIENT INSTRUCTIONS
Plan:   1. Physical Therapy:  None at present time  2. Clinical Health Psychologist: Zac is working with an outside therapist with EMDR, I am in support of this  3. Diagnostic Studies:  Obtain lumbar MRI at [place], [place]. Call there to schedule when ready.   4. Medication Management:    1. Methocarbamol as needed per script for muscle spasms  2. TRIAL Lidocaine 4% patches for low back pain  3. Continue CBD cream  4. Voltaren gel 1% is also OK to use over-the-counter on your left foot and the right shoulder  5. Further procedures recommended:none  6. You have referred to see Sports Medicine (non-surgical ortho) re: the right shoulder pain. You can see them in [place] as well.   7. Recommendations to PCP: see above  8. Follow up: 8-12 week video visit due to COVID-19 risk. Please call [phone] to make your follow-up appointment with me.   9. Thanks for reaching out to us. At this time [facility] is only vaccinating our frontline healthcare providers, plus residents and staff at long-term care facilities. This approach is in line with phase 1A of the [facility]'s vaccine roll-out plan, and takes into account the limited distribution of vaccine supplies statewide. As our supply increases, we plan to begin offering the vaccine to other groups across [place]. We will share new updates as we have them on our FAQ at [email or web address]    =====================================  Clinic Number:  [phone]     Call with any questions about your care and for scheduling assistance.     Calls are returned Monday through Friday between 8 AM and 4:30 PM. We usually get back to you within 2 business days depending on the issue/request.    If we are prescribing your medications:    For opioid medication refills, call the clinic or send a [employer] message 7 days in advance.  Please include:    Name of requested medication    Name of the pharmacy.    For non-opioid medications, call your pharmacy directly to request a  refill. Please allow 3-4 days to be processed.     Per [place] State Law:    All controlled substance prescriptions must be filled within 30 days of being written.      For those controlled substances allowing refills, pickup must occur within 30 days of last fill.      We believe regular attendance is key to your success in our program!      Any time you are unable to keep your appointment we ask that you call us at least 24 hours in advance to cancel.This will allow us to offer the appointment time to another patient.     Multiple missed appointments may lead to dismissal from the clinic.

## 2021-04-15 ENCOUNTER — VIRTUAL VISIT (OUTPATIENT)
Dept: FAMILY MEDICINE | Facility: CLINIC | Age: 61
End: 2021-04-15
Payer: COMMERCIAL

## 2021-04-15 DIAGNOSIS — Z13.29 SCREENING FOR ENDOCRINE, METABOLIC AND IMMUNITY DISORDER: ICD-10-CM

## 2021-04-15 DIAGNOSIS — R97.20 ELEVATED PROSTATE SPECIFIC ANTIGEN (PSA): ICD-10-CM

## 2021-04-15 DIAGNOSIS — Z13.0 SCREENING FOR ENDOCRINE, METABOLIC AND IMMUNITY DISORDER: ICD-10-CM

## 2021-04-15 DIAGNOSIS — Z13.228 SCREENING FOR ENDOCRINE, METABOLIC AND IMMUNITY DISORDER: ICD-10-CM

## 2021-04-15 DIAGNOSIS — Z12.5 SCREENING FOR PROSTATE CANCER: ICD-10-CM

## 2021-04-15 DIAGNOSIS — M25.551 HIP PAIN, RIGHT: Primary | ICD-10-CM

## 2021-04-15 PROCEDURE — 99213 OFFICE O/P EST LOW 20 MIN: CPT | Mod: 95 | Performed by: FAMILY MEDICINE

## 2021-04-15 RX ORDER — TIMOLOL MALEATE 2.5 MG/ML
1 SOLUTION/ DROPS OPHTHALMIC DAILY
COMMUNITY

## 2021-04-15 NOTE — LETTER
[facility]  04/15/21  Patient: Mata Poe  YOB: 1960  Medical Record Number: [id number]                                                                             Non-Opioid Controlled Substance Agreement    This is an agreement between you and your provider regarding safe and appropriate controlled substance prescribing.? Controlled substances are?medicines that can cause physical and mental dependence. The manufacturing, possession and use of these medicines are regulated by law.  We here at [facility] are making a commitment to work with you in your efforts to get better.? To support you in this work, we will help you schedule regular appointments for medicine refills. If we must cancel or change your appointment for any reason, we will make sure you have enough medication to last until your next appointment.      As a Provider, I will:     Listen carefully to your concerns while treating you with dignity.     Recommend a treatment plan that I believe is in your best interest and may involve therapies other than medication.      Review the chance of benefit and the chance of harm of this medicine with you regularly and evaluate the safety and effectiveness of this therapy.       Provide a plan on how to discontinue if the decision is made to stop this medicine.       As a Patient, I understand controlled substances:       Are prescribed by my care provider to help me function or work and manage my condition(s).?    Are strong medicines and can cause serious side effects such as:     Drowsiness, which can seriously affect my driving ability    A lower breathing rate, enough to cause death    Harm to my thinking ability     Depression     Abuse of and addiction to this medicine.      Need to be taken exactly as prescribed.?Combining controlled substances with certain medicines or chemicals (such as illegal drugs, opioids, sedatives, sleeping pills, and  benzodiazepines) can be dangerous or even fatal.? If I stop taking my medicines suddenly, I may have severe withdrawal symptoms.     The risks, benefits, and side effects of these medicine(s) were explained to me. I agree that:    1. I will take part in other treatments as advised by my care team. This may be psychiatry or counseling, physical therapy, behavioral therapy, group treatment or a referral to specialist.    2. I will keep all my appointments and understand this is part of the monitoring of controlled substance.?My care team may require an office visit for EVERY controlled substance refill. If I miss appointments or don t follow instructions, my care team may stop my medicine    3. I will take my medicines as prescribed. I will not change the dose or schedule unless my care team tells me to. There will be no refills if I run out early.      4. I may be contactedwithout warning and asked to complete a urine drug test or pill count at any time. If I don t give a urine sample or participate in a pill count, the care team may stop my medicine.    5. I will only receive controlled substance prescriptions from this clinic. If treated by another provider, I will let them know I am taking controlled substances and that I have a treatment agreement with this provider. I will inform this care team within one business day if I am given a prescription for any controlled substance by another healthcare provider. This care team may contact other providers and pharmacists about my use of the medicines.     6. It is up to me to make sure that I do not run out of my medicines on weekends or holidays.?If my care team is willing to refill my prescription without a visit, I must request refills only during office hours. Refills may take up to 3 business days to process.  I will use one pharmacy to fill all my controlled substance prescriptions.  I will notify the clinic if any changes are made due to insurance changes or  medication availability.    7. I am responsible for my prescriptions. If the medicine/prescription is lost, stolen or destroyed, it will not be replaced.?I also agree not to share controlled substance medicines with anyone.     8. I am aware I should not use any illegal or recreational drugs. I also agree not to drink alcohol unless my care team says I can.     9. If I enroll in the [place] Medical Cannabis program, I will tell my care team.?    10. I will tell my care team right away if I become pregnant, have a new medical problem treated outside of my regular clinic, or have a change in my medications.     11. I understand that this medicine can affect my thinking, judgment and reaction time.? Alcohol and drugs affect the brain and body, which can affect the safety of a person s driving. Being under the influence of alcohol or drugs can affect a person s decision-making, behaviors, personal safety, and the safety of others. Driving while impaired (DWI) can occur if a person is driving, operating, or in physical control of a car, motorcycle, boat, snowmobile, ATV, motorbike, off-road vehicle, or any other motor vehicle ([place] Statute 169[place].20). I understand the risk if I choose to drive or operate machinery  I understand that if I do not follow any of the conditions above, my prescriptions or treatment may be stopped or changed.     I agree that my provider, clinic care team, and pharmacy may work with any city, state or federal law enforcement agency that investigates the misuse, sale, or other diversion of my controlled medicine. I will allow my provider to discuss my care with or share a copy of this agreement with any other treating provider, pharmacy or emergency room where I receive care.?    I have read this agreement and have asked questions about anything I did not understand.    ________________________________________________________  Patient Signature - Lizandroeva MATTHEW Poe     ___________________                    Date     ________________________________________________________  Provider Signature - Sieglinde Iris Sears, MD       ___________________                   Date     _______________________________________________________  Witness Signature (required if provider not present while patient signing)           ___________________                   Date

## 2021-04-15 NOTE — PROGRESS NOTES
Zac is a 61 year old who is being evaluated via a billable telephone visit.      What phone number would you like to be contacted at?   How would you like to obtain your AVS? MyChart    Assessment & Plan     Hip pain, right  Sciatic vs hip DJD. Will check imaging to assess degree of DJD.  - XR Pelvis w Hip Right G/E 2 Views; Future    Elevated prostate specific antigen (PSA)  Check labs.   - Prostate spec antigen screen; Future    Screening for endocrine, metabolic and immunity disorder  - Comprehensive metabolic panel; Future                     No follow-ups on file.    Parrish Sears MD  [facility]   Zac is a 61 year old who presents for the following health issues     HPI     Chronic/Recurring Back Pain Follow Up      Where is your back pain located? (Select all that apply) low back right    How would you describe your back pain?  dull ache, sharp and shooting    Where does your back pain spread? the right  knee    Since your last clinic visit for back pain, how has your pain changed? gradually worsening    Does your back pain interfere with your job? Not applicable    Since your last visit, have you tried any new treatment? Yes -  cold, heat and rest      How many servings of fruits and vegetables do you eat daily?  2-3    On average, how many sweetened beverages do you drink each day (Examples: soda, juice, sweet tea, etc.  Do NOT count diet or artificially sweetened beverages)?   0    How many days per week do you exercise enough to make your heart beat faster? 3 or less    How many minutes a day do you exercise enough to make your heart beat faster? 9 or less    How many days per week do you miss taking your medication? 0        Review of Systems   Constitutional, neuro, ENT, endocrine, pulmonary, cardiac, gastrointestinal, genitourinary, musculoskeletal, integument and psychiatric systems are negative, except as otherwise noted.       Objective            Vitals:  No vitals were obtained today due to virtual visit.    Physical Exam   healthy, alert and no distress  PSYCH: Alert and oriented times 3; coherent speech, normal   rate and volume, able to articulate logical thoughts, able   to abstract reason, no tangential thoughts, no hallucinations   or delusions  His affect is normal  RESP: No cough, no audible wheezing, able to talk in full sentences  Remainder of exam unable to be completed due to telephone visits                Phone call duration: 11 minutes

## 2021-04-17 ENCOUNTER — MYC MEDICAL ADVICE (OUTPATIENT)
Dept: FAMILY MEDICINE | Facility: CLINIC | Age: 61
End: 2021-04-17

## 2021-04-28 ENCOUNTER — MYC MEDICAL ADVICE (OUTPATIENT)
Dept: PALLIATIVE MEDICINE | Facility: CLINIC | Age: 61
End: 2021-04-28

## 2021-05-24 ENCOUNTER — HOSPITAL ENCOUNTER (OUTPATIENT)
Dept: MRI IMAGING | Facility: CLINIC | Age: 61
End: 2021-05-24
Attending: NURSE PRACTITIONER
Payer: COMMERCIAL

## 2021-05-24 ENCOUNTER — HOSPITAL ENCOUNTER (OUTPATIENT)
Dept: GENERAL RADIOLOGY | Facility: CLINIC | Age: 61
End: 2021-05-24
Attending: FAMILY MEDICINE
Payer: COMMERCIAL

## 2021-05-24 DIAGNOSIS — Z13.29 SCREENING FOR ENDOCRINE, METABOLIC AND IMMUNITY DISORDER: ICD-10-CM

## 2021-05-24 DIAGNOSIS — Z12.5 SCREENING FOR PROSTATE CANCER: ICD-10-CM

## 2021-05-24 DIAGNOSIS — Z13.0 SCREENING FOR ENDOCRINE, METABOLIC AND IMMUNITY DISORDER: ICD-10-CM

## 2021-05-24 DIAGNOSIS — M54.50 CHRONIC BILATERAL LOW BACK PAIN WITHOUT SCIATICA: ICD-10-CM

## 2021-05-24 DIAGNOSIS — F41.8 MIXED ANXIETY DEPRESSIVE DISORDER: Chronic | ICD-10-CM

## 2021-05-24 DIAGNOSIS — M54.59 LUMBAR FACET JOINT PAIN: ICD-10-CM

## 2021-05-24 DIAGNOSIS — Z13.228 SCREENING FOR ENDOCRINE, METABOLIC AND IMMUNITY DISORDER: ICD-10-CM

## 2021-05-24 DIAGNOSIS — M47.816 SPONDYLOSIS OF LUMBAR REGION WITHOUT MYELOPATHY OR RADICULOPATHY: ICD-10-CM

## 2021-05-24 DIAGNOSIS — G89.29 CHRONIC BILATERAL LOW BACK PAIN WITHOUT SCIATICA: ICD-10-CM

## 2021-05-24 LAB
ALBUMIN SERPL-MCNC: 3.9 G/DL (ref 3.4–5)
ALP SERPL-CCNC: 75 U/L (ref 40–150)
ALT SERPL W P-5'-P-CCNC: 23 U/L (ref 0–70)
ANION GAP SERPL CALCULATED.3IONS-SCNC: 5 MMOL/L (ref 3–14)
AST SERPL W P-5'-P-CCNC: 19 U/L (ref 0–45)
BILIRUB SERPL-MCNC: 0.9 MG/DL (ref 0.2–1.3)
BUN SERPL-MCNC: 15 MG/DL (ref 7–30)
CALCIUM SERPL-MCNC: 8.5 MG/DL (ref 8.5–10.1)
CHLORIDE SERPL-SCNC: 103 MMOL/L (ref 94–109)
CO2 SERPL-SCNC: 28 MMOL/L (ref 20–32)
CREAT SERPL-MCNC: 0.86 MG/DL (ref 0.66–1.25)
GFR SERPL CREATININE-BSD FRML MDRD: >90 ML/MIN/{1.73_M2}
GLUCOSE SERPL-MCNC: 105 MG/DL (ref 70–99)
POTASSIUM SERPL-SCNC: 4.3 MMOL/L (ref 3.4–5.3)
PROT SERPL-MCNC: 7.4 G/DL (ref 6.8–8.8)
PSA SERPL-ACNC: 3.63 UG/L (ref 0–4)
SODIUM SERPL-SCNC: 136 MMOL/L (ref 133–144)

## 2021-05-24 PROCEDURE — G0103 PSA SCREENING: HCPCS | Performed by: FAMILY MEDICINE

## 2021-05-24 PROCEDURE — 73502 X-RAY EXAM HIP UNI 2-3 VIEWS: CPT

## 2021-05-24 PROCEDURE — 36415 COLL VENOUS BLD VENIPUNCTURE: CPT | Performed by: FAMILY MEDICINE

## 2021-05-24 PROCEDURE — 72148 MRI LUMBAR SPINE W/O DYE: CPT

## 2021-05-24 PROCEDURE — 80053 COMPREHEN METABOLIC PANEL: CPT | Performed by: FAMILY MEDICINE

## 2021-05-25 ENCOUNTER — RECORDS - HEALTHEAST (OUTPATIENT)
Dept: ADMINISTRATIVE | Facility: CLINIC | Age: 61
End: 2021-05-25

## 2021-05-25 DIAGNOSIS — M79.18 LUMBAR MUSCLE PAIN: ICD-10-CM

## 2021-05-25 RX ORDER — METHOCARBAMOL 750 MG/1
TABLET, FILM COATED ORAL
Qty: 120 TABLET | Refills: 1 | Status: SHIPPED | OUTPATIENT
Start: 2021-05-25 | End: 2021-07-06

## 2021-05-25 NOTE — TELEPHONE ENCOUNTER
Pending Prescriptions:                       Disp   Refills    methocarbamol (ROBAXIN) 750 MG tablet     120 ta*1            Sig: TAKE ONE to Two TABLETs BY MOUTH FOUR TIMES A DAY           as needed    Last refill 04/27/21  Last office visit 03/16/2021  Next appt 5/26/2021    Zoe Feng MA

## 2021-05-25 NOTE — TELEPHONE ENCOUNTER
Signed Prescriptions:                        Disp   Refills    methocarbamol (ROBAXIN) 750 MG tablet      120 ta*1        Sig: TAKE ONE to Two TABLETs BY MOUTH FOUR TIMES A DAY as           needed  Authorizing Provider: MARYCRUZ MCDERMOTT, RN CNP, FNP  [facility]  [facility]

## 2021-05-26 ENCOUNTER — VIRTUAL VISIT (OUTPATIENT)
Dept: PALLIATIVE MEDICINE | Facility: CLINIC | Age: 61
End: 2021-05-26
Payer: COMMERCIAL

## 2021-05-26 DIAGNOSIS — G90.522 COMPLEX REGIONAL PAIN SYNDROME TYPE 1 OF LEFT LOWER EXTREMITY: ICD-10-CM

## 2021-05-26 DIAGNOSIS — M62.838 MUSCLE SPASM: ICD-10-CM

## 2021-05-26 DIAGNOSIS — M47.817 LUMBOSACRAL SPONDYLOSIS WITHOUT MYELOPATHY: ICD-10-CM

## 2021-05-26 DIAGNOSIS — M54.41 CHRONIC BILATERAL LOW BACK PAIN WITH RIGHT-SIDED SCIATICA: ICD-10-CM

## 2021-05-26 DIAGNOSIS — M54.16 LUMBAR RADICULOPATHY: Primary | ICD-10-CM

## 2021-05-26 DIAGNOSIS — G89.29 CHRONIC RIGHT SHOULDER PAIN: ICD-10-CM

## 2021-05-26 DIAGNOSIS — M54.59 LUMBAR FACET JOINT PAIN: ICD-10-CM

## 2021-05-26 DIAGNOSIS — M79.2 NEUROPATHIC PAIN: ICD-10-CM

## 2021-05-26 DIAGNOSIS — G89.29 CHRONIC BILATERAL LOW BACK PAIN WITH RIGHT-SIDED SCIATICA: ICD-10-CM

## 2021-05-26 DIAGNOSIS — M25.511 CHRONIC RIGHT SHOULDER PAIN: ICD-10-CM

## 2021-05-26 DIAGNOSIS — M51.369 DDD (DEGENERATIVE DISC DISEASE), LUMBAR: ICD-10-CM

## 2021-05-26 PROCEDURE — 99214 OFFICE O/P EST MOD 30 MIN: CPT | Mod: 95 | Performed by: NURSE PRACTITIONER

## 2021-05-26 RX ORDER — LORAZEPAM 1 MG/1
TABLET ORAL
Qty: 15 TABLET | Refills: 0 | Status: SHIPPED | OUTPATIENT
Start: 2021-05-26 | End: 2021-07-06

## 2021-05-26 ASSESSMENT — PAIN SCALES - GENERAL: PAINLEVEL: EXTREME PAIN (9)
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
## 2021-05-26 NOTE — TELEPHONE ENCOUNTER
Prescription faxed to pharmacy, please notify patient.      PDMP Review       Value Time User    State PDMP site checked  Yes 5/26/2021  8:22 AM Parrish Sears MD

## 2021-05-26 NOTE — PATIENT INSTRUCTIONS
Plan:   1. Physical Therapy:  None at present time  2. Clinical Health Psychologist: Zac is working with an outside therapist with EMDR, I am in support of this  3. Diagnostic Studies:  None  4. Medication Management:    5. Methocarbamol as needed per script for muscle spasm  6. Continue CBD crea  7. Voltaren gel 1% is also OK to use over-the-counter on your left foot and the right shoulde  8. Further procedures recommended: schedule lumbar epidural steroid injection, our office to contact you. This should help the low back pain and right leg pain  9. Recommendations to PCP: see above  10. Follow up: 3-4 weeks after lumbar epidural steroid injection, I want to see you in-person.  Please call [phone] to make your follow-up appointment with me.             ----------------------------------------------------------------  Clinic Number:  [phone]     Call with any questions about your care and for scheduling assistance.     Calls are returned Monday through Friday between 8 AM and 4:30 PM. We usually get back to you within 2 business days depending on the issue/request.    If we are prescribing your medications:    For opioid medication refills, call the clinic or send a [employer] message 7 days in advance.  Please include:    Name of requested medication    Name of the pharmacy.    For non-opioid medications, call your pharmacy directly to request a refill. Please allow 3-4 days to be processed.     Per [place] State Law:    All controlled substance prescriptions must be filled within 30 days of being written.      For those controlled substances allowing refills, pickup must occur within 30 days of last fill.      We believe regular attendance is key to your success in our program!      Any time you are unable to keep your appointment we ask that you call us at least 24 hours in advance to cancel.This will allow us to offer the appointment time to another patient.     Multiple missed appointments may lead to  dismissal from the clinic.

## 2021-05-27 ENCOUNTER — MYC MEDICAL ADVICE (OUTPATIENT)
Dept: PALLIATIVE MEDICINE | Facility: CLINIC | Age: 61
End: 2021-05-27

## 2021-05-27 ENCOUNTER — TELEPHONE (OUTPATIENT)
Dept: PALLIATIVE MEDICINE | Facility: CLINIC | Age: 61
End: 2021-05-27

## 2021-05-27 ENCOUNTER — VIRTUAL VISIT (OUTPATIENT)
Dept: FAMILY MEDICINE | Facility: CLINIC | Age: 61
End: 2021-05-27
Payer: COMMERCIAL

## 2021-05-27 DIAGNOSIS — M54.16 LUMBAR RADICULOPATHY: Primary | ICD-10-CM

## 2021-05-27 PROCEDURE — 99213 OFFICE O/P EST LOW 20 MIN: CPT | Mod: 95 | Performed by: FAMILY MEDICINE

## 2021-05-27 NOTE — TELEPHONE ENCOUNTER
PA pending additional information - please specify exact level, laterality and approach of injection.    Ibeth ROMERO    [facility]

## 2021-05-27 NOTE — TELEPHONE ENCOUNTER
Screening Questions for Radiology Injections:    Injection to be done at which interventional clinic site? [facility]    If [facility] location, tell patient that this procedure requires a COVID-19 lab test be done within 4 days of the procedure. Would you still like to move forward with scheduling the procedure?  YES:    If YES, let patient know that someone will call them to schedule the COVID-19 test and that they will only receive a call back if the result is positive. Route to nursing to enter order.     Instruct patient to arrive as directed prior to the scheduled appointment time:    [place] minutes before      Betty: 30 minutes before; if IV needed 1 hour before     Procedure ordered by BALDEMAR    Procedure ordered? LESI      Transforaminal Cervical ISABELLE - no pain provider currently performing    As a reminder, receiving steroids can decrease your body's ability to fight infection.   Would you still like to move forward with scheduling the injection?  Yes    What insurance would patient like us to bill for this procedure? [employer]      Worker's comp or MVA (motor vehicle accident) -Any injection DO NOT SCHEDULE and route to Ibeth Torrez.      [employer] insurance - For SI joint injections, DO NOT SCHEDULE and route Ibeth Torrez.       ALL BCBS, [employer] and [employer]-Route to Ibeth for review DO NOT SCHEDULE      IF SCHEDULING IN [place] AND NEEDS A PA, IT IS OKAY TO SCHEDULE. [place] HANDLES THEIR OWN PA'S AFTER THE PATIENT IS SCHEDULED. PLEASE SCHEDULE AT LEAST 1 WEEK OUT SO A PA CAN BE OBTAINED.    Any chance of pregnancy? Not Applicable   If YES, do NOT schedule and route to RN pool    Is an  needed? No     Patient has a drive home? (mandatory) YES:     Is patient taking any blood thinners (i.e. plavix, coumadin, jantoven, warfarin, heparin, pradaxa or dabigatran, etc)? No   If hold needed, do NOT schedule, route to RN pool     Is patient taking any aspirin products  (includes Excedrin and Fiorinal)? No     If more than 325mg/day, OK to schedule; Instruct pt to decrease to less than 325 mg for 7 days AND route to RN pool    For CERVICAL procedures, hold all aspirin products for 6 days.     Tell pt that if aspirin product is not held for 6 days, the procedure WILL BE cancelled.      Does the patient have a bleeding or clotting disorder? No     If YES, okay to schedule AND route to RN nurse pool    For any patients with platelet count <100, must be forwarded to provider    Any allergies to contrast dye, iodine, shellfish, or numbing and steroid medications? No    If YES, add allergy information to appointment notes AND route to the RN pool     If ISABELLE and Contrast Dye / Iodine Allergy? DO NOT SCHEDULE, route to RN pool    Allergies: Lyrica [pregabalin] and Morphine     Is patient diabetic?  No  If YES, instruct them to bring their glucometer.    Does patient have an active infection or treated for one within the past week? No     Is patient currently taking any antibiotics?  No     For patients on chronic, preventative, or prophylactic antibiotics, procedures may be scheduled.     For patients on antibiotics for active or recent infection:antibiotic course must have been completed for 4 days    Is patient currently taking any steroid medications? (i.e. Prednisone, Medrol)  No     For patients on steroid medications, course must have been completed for 4 days    Is patient actively being treated for cancer or immunocompromised? No  If YES, do NOT schedule and route to RN pool     Are you able to get on and off an exam table with minimal or no assistance? Yes  If NO, do NOT schedule and route to RN pool    Are you able to roll over and lay on your stomach with minimal or no assistance? Yes  If NO, do NOT schedule and route to RN pool     Has the patient had a flu shot or any other vaccinations within 7 days before or after the procedure.  No     Have you recently had a COVID vaccine  or have plans to get it in the near future? No    If yes, explain that for the vaccine to work best they need to:       wait 1 week before and 1 week after getting Vaccine #1    wait 1 week before and 2 weeks after getting Vaccine #2    If patient has concerns about the timing, send to RN pool     Does patient have an MRI/CT?  YES:   Check Procedure Scheduling Grid to see if required.      Was the MRI done within the last 3 years?  Yes    If yes, where was the MRI done i.e.[facility], [facility], [place], [facility] etc? [facility]      If no, do not schedule and route to RN pool    If MRI was not done at [place], [facility] or [facility] do NOT schedule and route to RN pool.      If pt has an imaging disc, the injection MAY be scheduled but pt has to bring disc to appt.     If they show up without the disc the injection cannot be done    Procedure Specific Instructions:      If celiac plexus block, informed patient NPO for 6 hours and that it is okay to take medications with sips of water, especially blood pressure medications  NO         If this is for a cervical procedure, informed patient that aspirin needs to be held for 6 days.   Not Applicable      If IV needed:    Do not schedule procedures requiring IV placement in the first appointment of the day or first appointment after lunch. Do NOT schedule at 0745, 0815 or 1245.     Instructed pt to arrive 30 minutes early for IV start if required. (Check Procedure Scheduling Grid)  NO    Reminders:      If you are started on any steroids or antibiotics between now and your appointment, you must contact us because the procedure may need to be cancelled.  Yes      For all procedures except radiofrequency ablations (RFAs) and spinal cord stimulator (SCS) trials, informed patient:    IV sedation is not provided for this procedure.  If you feel that an oral anti-anxiety medication is needed, you can discuss this further with your referring provider or primary care  provider.  The Pain Clinic provider will discuss specifics of what the procedure includes at your appointment.  Most procedures last 10-20 minutes.  We use numbing medications to help with any discomfort during the procedure.  NO      For patients 85 or older we recommend having an adult stay w/ them for the remainder of the day.       Does the patient have any questions?  NO  Ibeth Torrez  [facility]

## 2021-05-27 NOTE — PROGRESS NOTES
Zac is a 61 year old who is being evaluated via a billable telephone visit.      What phone number would you like to be contacted at?   How would you like to obtain your AVS? MyChart    Assessment & Plan     Lumbar radiculopathy  Trial physical therapy. Follow-up if not improving or if worsening.   - PHYSICAL THERAPY REFERRAL; Future                 Return in about 3 months (around 8/27/2021) for if not improved or sooner if worsening.    Parrish Sears MD  [facility]   Zac is a 61 year old who presents for the following health issues     HPI           Review of Systems   Constitutional, neuro, ENT, endocrine, pulmonary, cardiac, gastrointestinal, genitourinary, musculoskeletal, integument and psychiatric systems are negative, except as otherwise noted.       Objective       History of lumbar radiculopathy. Following with the pain clinic. They are planing EDSI. Is not currently doing physical therapy.    Vitals:  No vitals were obtained today due to virtual visit.    Physical Exam   healthy, alert and no distress  PSYCH: Alert and oriented times 3; coherent speech, normal   rate and volume, able to articulate logical thoughts, able   to abstract reason, no tangential thoughts, no hallucinations   or delusions  His affect is normal  RESP: No cough, no audible wheezing, able to talk in full sentences  Remainder of exam unable to be completed due to telephone visits    Reviewed imagine results with patient.            Phone call duration: 13 minutes

## 2021-05-27 NOTE — TELEPHONE ENCOUNTER
Will route to Jenna as she just saw this patient yesterday. Looks like she is asking for a right transforaminal epidural steroid injection but I am unsure as to the level.  She mentions L2-3 which would be groin pain and L4-5 which would be more in line with the patients radicular pain pattern?     Wanda Esquviel MD

## 2021-05-28 NOTE — TELEPHONE ENCOUNTER
[id number].    Auth No / Request ID   Status Auto-Approved  Decision Approved  Effective Date 05/28/2021  Expiration Date 07/12/2021  Decision Date 05/28/2021    Amrita to schedule with Dr Eckert      1 level OLGA ROMERO    [facility]

## 2021-05-28 NOTE — TELEPHONE ENCOUNTER
Pt calling to state the authorizations for [employer] can be reached at [phone].    Rabia RHOADES    [facility]

## 2021-06-02 NOTE — TELEPHONE ENCOUNTER
Patient is scheduled on 6/9/2021 with Dr. Eckert.     Jenna MOSLEY RN CNP, FNP  [facility]

## 2021-06-09 ENCOUNTER — RADIOLOGY INJECTION OFFICE VISIT (OUTPATIENT)
Dept: PALLIATIVE MEDICINE | Facility: CLINIC | Age: 61
End: 2021-06-09
Payer: COMMERCIAL

## 2021-06-09 VITALS — OXYGEN SATURATION: 97 % | HEART RATE: 62 BPM | DIASTOLIC BLOOD PRESSURE: 90 MMHG | SYSTOLIC BLOOD PRESSURE: 148 MMHG

## 2021-06-09 DIAGNOSIS — M54.16 LUMBAR RADICULOPATHY: Primary | ICD-10-CM

## 2021-06-09 PROCEDURE — 62323 NJX INTERLAMINAR LMBR/SAC: CPT | Performed by: PSYCHIATRY & NEUROLOGY

## 2021-06-09 ASSESSMENT — PAIN SCALES - GENERAL: PAINLEVEL: EXTREME PAIN (9)

## 2021-06-09 NOTE — PROGRESS NOTES
Pre procedure Diagnosis: lumbar radiculopathy    Post procedure Diagnosis: Same  Procedure performed: L3-4 interlaminar epidural steroid injection   Anesthesia: none  Complications: none  Operators: Savanna Eckert MD     Indications:   Mata Poe is a 61 year old male was sent by Jenna Parrish NP for epidural steroid injection.  They have a history of right low back and leg pain, mostly anterior leg and knee, sometimes going to anterior shin.  Exam shows decrease sensation to light touch on the right, overall mild hip flexion weakness bilaterally, normal gait and they have tried conservative treatment including medications.    MRI was done on 5/24/21 which showed   1. Right foraminal disc extrusion at L3-L4.  2. Right foraminal disc protrusion at L2-L3.    Options/alternatives, benefits and risks were discussed with the patient including bleeding, infection, no pain relief, tissue trauma, exposure to radiation, reaction to medications, spinal cord injury, dural puncture, weakness, numbness and headache.  Questions were answered to his satisfaction and he agrees to proceed. Voluntary informed consent was obtained and signed.     Vitals were reviewed: Yes  Allergies were reviewed:  Yes   Medications were reviewed:  Yes   Pre-procedure pain score: 9/10    Procedure:  After getting informed consent, patient was brought into the procedure suite and was placed in a prone position on the procedure table.   A Pause for the Cause was performed.  Patient was prepped and draped in sterile fashion.     The L3/4 interspace was identified with AP fluoroscopy.  A total of 5ml of 1% lidocaine mixed 10:1 with bicarbonate was used to anesthetize the skin for a right paramedian approach.    A 20gauge 3.5inch Touhy needle was advanced under intermittent fluoroscopy.  A YAW syringe was used to advance the needle into the epidural space.   After loss of resistance, there was no evidence of blood or CSF on aspiration. Location  was verified with both AP and lateral fluoroscopic imaging.    A total of 4ml of Omnipaque 300 was injected demonstrating appropriate epidural spread and was checked in both the AP and lateral views. The initial approach was a bit lateral and had spread outside the spine but didn't go away like vascular. Regardless, needle was adjusted more medial and entered the epidural space again. No vascular flow. 6ml was wasted. There was no evidence of intravascular or intrathecal spread.    2 ml of 0.5% bupivacaine with 10mg of dexamethasone and 2ml of preservative free saline was injected.  The needle was removed from the epidural space, flushed with 1% lidocaine and removed.     Hemostasis was achieved, the area was cleaned, and bandaids were placed when appropriate.  The patient tolerated the procedure well, and was taken to the recovery room.    Images were saved to PACS.    Post-procedure pain score: 2/10  Follow-up includes:   -f/u with referring provider    Savanna Eckert MD  [facility]

## 2021-06-09 NOTE — NURSING NOTE
Pre-procedure Intake    Have you been fasting? NA    If yes, for how long?     Are you taking a prescribed blood thinner such as coumadin, Plavix, Xarelto?    No    If yes, when did you take your last dose?     Do you take aspirin?  No    If cervical procedure, have you held aspirin for 6 days?   NA    Do you have any allergies to contrast dye, iodine, steroid and/or numbing medications?  NO    Are you currently taking antibiotics or have an active infection?  NO    Have you had a fever/elevated temperature within the past week? NO    Are you currently taking oral steroids? NO    Do you have a ? Yes       Are you pregnant or breastfeeding?  Not Applicable    Have you received the COVID-19 vaccine? Yes       If yes, was it your 1st, 2nd or only dose needed? 2nd dose     Date of most recent vaccine: 04/18/2021    Zoe Feng MA      Notify provider and RNs if systolic BP >170, diastolic BP >100, P >100 or O2 sats < 90%

## 2021-06-09 NOTE — NURSING NOTE
Discharge Information    IV Discontiued Time:  NA    Amount of Fluid Infused:  NA    Discharge Criteria = When patient returns to baseline or as per MD order    Consciousness:  Pt is fully awake    Circulation:  BP +/- 20% of pre-procedure level    Respiration:  Patient is able to breathe deeply    O2 Sat:  Patient is able to maintain O2 Sat >92% on room air    Activity:  Moves 4 extremities on command    Ambulation:  Patient is able to stand and walk or stand and pivot into wheelchair    Dressing:  Clean/dry or No Dressing    Notes:   Discharge instructions and AVS given to patient    Patient meets criteria for discharge?  YES    Admitted to PCU?  No    Responsible adult present to accompany patient home?  Yes    Signature/Title:    paul rios RN  RN Care Coordinator  [facility] [place]

## 2021-06-09 NOTE — PATIENT INSTRUCTIONS
[facility]   Procedure Discharge Instructions    Today you saw:  Shanita Eckert      You had an:  Epidural steroid injection  -lumbar  Medications used:  Lidocaine   Bupivacaine   Dexamethasone Omnipaque    Normal saline          Be cautious when walking. Numbness and/or weakness in the lower extremities may occur for up to 6-8 hours after the procedure due to effect of the local anesthetic    Do not drive for 6 hours. The effect of the local anesthetic could slow your reflexes.     You may resume your regular activities after 24 hours    Avoid strenuous activity for the first 24 hours    You may shower, however avoid swimming, tub baths or hot tubs for 24 hours following your procedure    You may have a mild to moderate increase in pain for several days following the injection.    It may take up to 14 days for the steroid medication to start working although you may feel the effect as early as a few days after the procedure.       You may use ice packs for 10-15 minutes, 3 to 4 times a day at the injection site for comfort    Do not use heat to painful areas for 6 to 8 hours. This will give the local anesthetic time to wear off and prevent you from accidentally burning your skin.     Unless you have been directed to avoid the use of anti-inflammatory medications (NSAIDS), you may use medications such as ibuprofen, Aleve or Tylenol for pain control if needed.     If you were fasting, you may resume your normal diet and medications after the procedure    If you have diabetes, check your blood sugar more frequently than usual as your blood sugar may be higher than normal for 10-14 days following a steroid injection. Contact your doctor who manages your diabetes if your blood sugar is higher than usual    Possible side effects of steroids that you may experience include flushing, elevated blood pressure, increased appetite, mild headaches and restlessness.  All of these symptoms will get  better with time.    If you experience any of the following, call the Pain Clinic during work hours (Mon-Friday 8-4:30 pm) at [phone] or the Provider Line after hours at [phone]:  -Fever over 100 degree F  -Swelling, bleeding, redness, drainage, warmth at the injection site  -Progressive weakness or numbness in your legs or arms  -Loss of bowel or bladder function  -Unusual headache that is not relieved by Tylenol or other pain reliever  -Unusual new onset of pain that is not improving

## 2021-06-10 NOTE — PATIENT INSTRUCTIONS
Your BMI is Body mass index is There is no height or weight on file to calculate BMI.     A BMI of 18.5 to 24.9 is in the healthy range. A person with a BMI of 25 to 29.9 is considered overweight, and someone with a BMI of 30 or greater is considered obese. More than two-thirds of American adults are considered overweight or obese.  Weight management is a personal decision.  If you are interested in exploring weight loss strategies, the following discussion covers the approaches that may be successful. Body mass index (BMI) is one way to tell whether you are at a healthy weight, overweight, or obese. It measures your weight in relation to your height.  Being overweight or obese increases the risk for further weight gain. Excess weight may lead to heart disease and diabetes.  Creating and following plans for healthy eating and physical activity may help you improve your health.  Weight control is part of healthy lifestyle and includes exercise, emotional health, and healthy eating habits. Careful eating habits lifelong are the mainstay of weight control. Though there are significant health benefits from weight loss, long-term weight loss with diet alone may be very difficult to achieve- studies show long-term success with dietary management alone in less than 10% of people. Attaining a healthy weight may be especially difficult to achieve in those with severe obesity. In some cases, medications, devices and surgical management might be considered.  What can you do?    Keep a food journal to help with mindful eating and finding ways to modify your diet.      Reduce the amount of processed food in your diet. Focus on adding vegetables, and lean proteins.    Reduce dietary carbohydrates. Limiting to  gm of carbohydrates per day has been shows to help boost weight loss.  If you have diabetes or are on diabetic medications do not do this without talking to your physician or healthcare provider.    Diet combined with  exercise helps maintain muscle while optimizing fat loss. Strength training is particularly important for building and maintaining muscle mass. Exercise helps reduce stress, increase energy, and improves fitness. Increasing exercise without diet control, however, may not burn enough calories to loose weight.         Start walking three days a week 10-20 minutes at a time    Work towards walking thirty minutes five days a week      Eventually, increase the speed of your walking for 1-2 minutes at time    In addition, we recommend that you review healthy lifestyles and methods for weight loss available through the National Institutes of Health patient information sites:  http://win.niddk.nih.gov/publications/index.htm    Also look into health and wellness programs that may be available through your health insurance provider, employer, local community center, or elen club.

## 2021-06-23 ENCOUNTER — TELEPHONE (OUTPATIENT)
Dept: PALLIATIVE MEDICINE | Facility: CLINIC | Age: 61
End: 2021-06-23

## 2021-06-23 ENCOUNTER — MYC MEDICAL ADVICE (OUTPATIENT)
Dept: PALLIATIVE MEDICINE | Facility: CLINIC | Age: 61
End: 2021-06-23

## 2021-06-23 NOTE — TELEPHONE ENCOUNTER
Reason for call:  Other   Patient called regarding (reason for call): call back  Additional comments: Pt calling to state that his pain is worse after his back injection and is wondering if a second shot might help.    Phone number to reach patient:  Cell number on file:    Telephone Information:   Mobile [phone]       Best Time:  anytime    Can we leave a detailed message on this number?  YES    Travel screening: Not Applicable     Rabia RHOADES    [facility]

## 2021-06-24 NOTE — TELEPHONE ENCOUNTER
Rabia Farrell   Creation Time:  6/23/2021  3:29 PM         Reason for call:  Other   Patient called regarding (reason for call): call back  Additional comments: Pt calling to state that his pain is worse after his back injection and is wondering if a second shot might help.     Phone number to reach patient:  Cell number on file:    Telephone Information:   Mobile [phone]     Best Time:  anytime     Can we leave a detailed message on this number?  YES     Travel screening: Not Applicable      Rabia RHOADES    [facility]           And  Per patient [employer] message:  From: Zac Poe      Created: 6/23/2021 5:07 PM      Hello Dr  My back and leg pain was pretty good for about a week after the shot, but has been very painful since in fact more than  before the injection and definitely more than my foot.  Is there anything more you could do as I am  not getting as much relief from the Methocarbamol as I did before and I could at least walk more before.  Please let me know what I can do.   Thank You  Zac Poe

## 2021-06-24 NOTE — TELEPHONE ENCOUNTER
6/9/21: lumbar epidural steroid injection   5/26/21: last visit with DIMITRI Nova CNP   Per DIMITRI Nova CNP at the above visit: Follow up: 3-4 weeks after lumbar epidural steroid injection, I want to see you in-person.  Please call [phone] to make your follow-up appointment with me.     Per DIMITRI Nova CNP's notes: MUSCLE RELAXANTS: Skelaxin (helpful), methocarbamol (helpful)  _________________________________    Mychart sent to pt:  From: Frida Finney RN      Created: 6/24/2021 1:44 PM      Marco A Frank,  I will send this on to DIMITRI Nova CNP to review for you but be sure to call and scheduled an in-person visit with her.  Per your last visit she wanted you to f/u 3-4 weeks after your injection. [phone]      Frida Hoang, RN-BSN  [facility]

## 2021-06-30 ENCOUNTER — VIRTUAL VISIT (OUTPATIENT)
Dept: PALLIATIVE MEDICINE | Facility: CLINIC | Age: 61
End: 2021-06-30
Payer: COMMERCIAL

## 2021-06-30 ENCOUNTER — MYC MEDICAL ADVICE (OUTPATIENT)
Dept: PALLIATIVE MEDICINE | Facility: CLINIC | Age: 61
End: 2021-06-30

## 2021-06-30 DIAGNOSIS — G89.29 CHRONIC BILATERAL LOW BACK PAIN WITH RIGHT-SIDED SCIATICA: ICD-10-CM

## 2021-06-30 DIAGNOSIS — M62.838 MUSCLE SPASM: ICD-10-CM

## 2021-06-30 DIAGNOSIS — M79.2 NEUROPATHIC PAIN: ICD-10-CM

## 2021-06-30 DIAGNOSIS — M51.369 DDD (DEGENERATIVE DISC DISEASE), LUMBAR: ICD-10-CM

## 2021-06-30 DIAGNOSIS — M47.26 OSTEOARTHRITIS OF SPINE WITH RADICULOPATHY, LUMBAR REGION: ICD-10-CM

## 2021-06-30 DIAGNOSIS — M54.59 LUMBAR FACET JOINT PAIN: ICD-10-CM

## 2021-06-30 DIAGNOSIS — M54.41 CHRONIC BILATERAL LOW BACK PAIN WITH RIGHT-SIDED SCIATICA: ICD-10-CM

## 2021-06-30 DIAGNOSIS — M54.16 LUMBAR RADICULOPATHY: Primary | ICD-10-CM

## 2021-06-30 DIAGNOSIS — G90.522 COMPLEX REGIONAL PAIN SYNDROME TYPE 1 OF LEFT LOWER EXTREMITY: ICD-10-CM

## 2021-06-30 PROCEDURE — 99215 OFFICE O/P EST HI 40 MIN: CPT | Mod: 95 | Performed by: NURSE PRACTITIONER

## 2021-06-30 ASSESSMENT — PAIN SCALES - GENERAL: PAINLEVEL: EXTREME PAIN (9)

## 2021-06-30 NOTE — PATIENT INSTRUCTIONS
Plan:   1. Physical Therapy:  None at present time  2. Clinical Health Psychologist: Zac is working with an outside therapist with EMDR, I am in support of this  3. Diagnostic Studies:  None  4. Medication Management:    1. Methocarbamol as needed per script for muscle spasm  2. Continue CBD cream  3. Voltaren gel 1% is also OK to use over-the-counter on your left foot and the right shoulde  5. Further procedures recommended: schedule repeat lumbar epidural steroid injection, our office to contact you. This should help the low back pain and right leg pain  6. Recommendations to PCP: see above  7. Follow up: 8 weeks in-person in clinic visit.  Please call [phone] to make your follow-up appointment with me.         ----------------------------------------------------------------  Clinic Number:  [phone]     Call with any questions about your care and for scheduling assistance.     Calls are returned Monday through Friday between 8 AM and 4:30 PM. We usually get back to you within 2 business days depending on the issue/request.    If we are prescribing your medications:    For opioid medication refills, call the clinic or send a [employer] message 7 days in advance.  Please include:    Name of requested medication    Name of the pharmacy.    For non-opioid medications, call your pharmacy directly to request a refill. Please allow 3-4 days to be processed.     Per [place] State Law:    All controlled substance prescriptions must be filled within 30 days of being written.      For those controlled substances allowing refills, pickup must occur within 30 days of last fill.      We believe regular attendance is key to your success in our program!      Any time you are unable to keep your appointment we ask that you call us at least 24 hours in advance to cancel.This will allow us to offer the appointment time to another patient.     Multiple missed appointments may lead to dismissal from the clinic.
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
## 2021-07-01 NOTE — TELEPHONE ENCOUNTER
Marco A Eckert and I connected and we agree repeating a lumbar epidural steroid injection makes sense. I have placed the order for this.      Send me a [employer] message 2-3 weeks after that injection to let me know how helpful it was, Okay?      Thanks.  Jenna MOSLEY RN CNP, LOLISP  [facility]    I have sent the above [employer] message to the patient.     Jenna MOSLEY RN CNP, P  [facility]

## 2021-07-03 DIAGNOSIS — F41.8 MIXED ANXIETY DEPRESSIVE DISORDER: Chronic | ICD-10-CM

## 2021-07-06 DIAGNOSIS — M79.18 LUMBAR MUSCLE PAIN: ICD-10-CM

## 2021-07-06 RX ORDER — LORAZEPAM 1 MG/1
TABLET ORAL
Qty: 15 TABLET | Refills: 0 | Status: SHIPPED | OUTPATIENT
Start: 2021-07-06 | End: 2021-08-18

## 2021-07-06 RX ORDER — METHOCARBAMOL 750 MG/1
TABLET, FILM COATED ORAL
Qty: 120 TABLET | Refills: 1 | Status: SHIPPED | OUTPATIENT
Start: 2021-07-06 | End: 2021-08-17

## 2021-07-06 NOTE — TELEPHONE ENCOUNTER
Signed Prescriptions:                        Disp   Refills    methocarbamol (ROBAXIN) 750 MG tablet      120 ta*1        Sig: TAKE ONE to Two TABLETs BY MOUTH FOUR TIMES A DAY as           needed  Authorizing Provider: MARYCRUZ MCDERMOTT, RN CNP, FNP  [facility]  [facility]

## 2021-07-06 NOTE — TELEPHONE ENCOUNTER
Received fax request from   [facility] - [place], [place] - [address]   [address] [facility] [address]  Phone: [phone] Fax: [phone]    Pharmacy requesting refill(s) for methocarbamol (ROBAXIN) 750 MG tablet    Last refilled on 06/22/21    Pt last seen on 06/30/21    Next appt scheduled for 07/19/21 (Dr. Eckert)    Will facilitate refill.    Litzy Byrd, [facility]

## 2021-07-09 NOTE — TELEPHONE ENCOUNTER
Patient seen in virtual visit on 6/30/2021. See that encounter please.   Jenna MOSLEY, YARELI CNP, FNP  [facility]

## 2021-07-19 ENCOUNTER — RADIOLOGY INJECTION OFFICE VISIT (OUTPATIENT)
Dept: PALLIATIVE MEDICINE | Facility: CLINIC | Age: 61
End: 2021-07-19
Attending: NURSE PRACTITIONER
Payer: COMMERCIAL

## 2021-07-19 VITALS
HEART RATE: 73 BPM | SYSTOLIC BLOOD PRESSURE: 152 MMHG | HEIGHT: 72 IN | RESPIRATION RATE: 16 BRPM | WEIGHT: 224 LBS | BODY MASS INDEX: 30.34 KG/M2 | DIASTOLIC BLOOD PRESSURE: 92 MMHG | OXYGEN SATURATION: 97 %

## 2021-07-19 DIAGNOSIS — M54.16 LUMBAR RADICULOPATHY: ICD-10-CM

## 2021-07-19 PROCEDURE — 62323 NJX INTERLAMINAR LMBR/SAC: CPT | Performed by: PSYCHIATRY & NEUROLOGY

## 2021-07-19 RX ORDER — DEXAMETHASONE SODIUM PHOSPHATE 10 MG/ML
10 INJECTION INTRAMUSCULAR; INTRAVENOUS ONCE
Status: COMPLETED | OUTPATIENT
Start: 2021-07-19 | End: 2021-07-19

## 2021-07-19 RX ADMIN — DEXAMETHASONE SODIUM PHOSPHATE 10 MG: 10 INJECTION INTRAMUSCULAR; INTRAVENOUS at 15:24

## 2021-07-19 ASSESSMENT — MIFFLIN-ST. JEOR: SCORE: 1850.09

## 2021-07-19 ASSESSMENT — PAIN SCALES - GENERAL: PAINLEVEL: WORST PAIN (10)

## 2021-07-19 NOTE — PATIENT INSTRUCTIONS
[facility]   Procedure Discharge Instructions    Today you saw:  Dr. Shanita Eckert      You had an:  Lumbar Epidural steroid injection       Medications used:  Lidocaine   Bupivacaine   Dexamethasone Omnipaque              If you were holding your blood thinning medication, please restart taking it: N/A    Be cautious when walking. Numbness and/or weakness in the lower extremities may occur for up to 6-8 hours after the procedure due to effect of the local anesthetic    Do not drive for 6 hours. The effect of the local anesthetic could slow your reflexes.     You may resume your regular activities after 24 hours    Avoid strenuous activity for the first 24 hours    You may shower, however avoid swimming, tub baths or hot tubs for 24 hours following your procedure    You may have a mild to moderate increase in pain for several days following the injection.    It may take up to 14 days for the steroid medication to start working although you may feel the effect as early as a few days after the procedure.       You may use ice packs for 10-15 minutes, 3 to 4 times a day at the injection site for comfort    Do not use heat to painful areas for 6 to 8 hours. This will give the local anesthetic time to wear off and prevent you from accidentally burning your skin.     Unless you have been directed to avoid the use of anti-inflammatory medications (NSAIDS), you may use medications such as ibuprofen, Aleve or Tylenol for pain control if needed.     Possible side effects of steroids that you may experience include flushing, elevated blood pressure, increased appetite, mild headaches and restlessness.  All of these symptoms will get better with time.    If you experience any of the following, call the Pain Clinic during work hours (Mon-Friday 8-4:30 pm) at [phone] or the Provider Line after hours at [phone]:  -Fever over 100 degree F  -Swelling, bleeding, redness, drainage, warmth at  the injection site  -Progressive weakness or numbness in your legs   -Loss of bowel or bladder function  -Unusual new onset of pain that is not improving

## 2021-07-19 NOTE — NURSING NOTE
Pre-procedure Intake    Have you been fasting? NA    If yes, for how long?     Are you taking a prescribed blood thinner such as coumadin, Plavix, Xarelto?    No    If yes, when did you take your last dose?     Do you take aspirin?  No    If cervical procedure, have you held aspirin for 6 days?   NA    Do you have any allergies to contrast dye, iodine, steroid and/or numbing medications?  NO    Are you currently taking antibiotics or have an active infection?  NO    Have you had a fever/elevated temperature within the past week? NO    Are you currently taking oral steroids? NO    Do you have a ? Yes       Are you pregnant or breastfeeding?  N/A    Have you received the COVID-19 vaccine? Yes       If yes, was it your 1st, 2nd or only dose needed? 2nd dose    Date of most recent vaccine: 4/19/2021    Notify provider and RNs if systolic BP >170, diastolic BP >100, P >100 or O2 sats < 90%

## 2021-07-19 NOTE — NURSING NOTE
Discharge Information    IV Discontiued Time:  NA    Amount of Fluid Infused:  NA    Discharge Criteria = When patient returns to baseline or as per MD order    Consciousness:  Pt is fully awake    Circulation:  BP +/- 20% of pre-procedure level    Respiration:  Patient is able to breathe deeply    O2 Sat:  Patient is able to maintain O2 Sat >92% on room air    Activity:  Moves 4 extremities on command    Ambulation:  Patient is able to stand and walk or stand and pivot into wheelchair    Dressing:  Clean/dry or No Dressing    Notes:   Discharge instructions and AVS given to patient    Patient meets criteria for discharge?  YES    Admitted to PCU?  No    Responsible adult present to accompany patient home?  Yes    Signature/Title:    Raheem Alcaraz RN  RN Care Coordinator  [facility] [place]

## 2021-07-19 NOTE — PROGRESS NOTES
Pre procedure Diagnosis: lumbar radiculopathy    Post procedure Diagnosis: Same  Procedure performed: L3-4 interlaminar epidural steroid injection   Anesthesia: none  Complications: none  Operators: Savanna Eckert MD and Alex Hauser MD     Indications:   Mata Poe is a 61 year old male was sent by Jenna Parrish NP for epidural steroid injection.  They have a history of right low back and leg pain, mostly anterior leg and knee, sometimes going to anterior shin.  He has noted that he has lost sensation now over the leg and thigh and has trouble lifting more weight as he doesn't trust the leg.  He has less pain when leaning forward and more when leaning back.  Stairs are hard as well.  Exam shows decrease sensation to light touch on the right over the L3 and L4 dermatomes as well as some weakness and pain with strength testing on the right with resistance, overall mild hip flexion weakness bilaterally, normal gait and they have tried conservative treatment including medications and prior injections.      MRI was done on 5/24/21 which showed   1. Right foraminal disc extrusion at L3-L4.  2. Right foraminal disc protrusion at L2-L3.    Options/alternatives, benefits and risks were discussed with the patient including bleeding, infection, no pain relief, tissue trauma, exposure to radiation, reaction to medications, spinal cord injury, dural puncture, weakness, numbness and headache.  Questions were answered to his satisfaction and he agrees to proceed. Voluntary informed consent was obtained and signed.     Vitals were reviewed: Yes  Allergies were reviewed:  Yes   Medications were reviewed:  Yes   Pre-procedure pain score: 10/10    Procedure:  After getting informed consent, patient was brought into the procedure suite and was placed in a prone position on the procedure table.   A Pause for the Cause was performed.  Patient was prepped and draped in sterile fashion.     The L3/4 interspace was identified  with AP fluoroscopy.  A total of 5ml of 1% lidocaine  was used to anesthetize the skin for a right paramedian approach.    A 20gauge 3.5inch Touhy needle was advanced under intermittent fluoroscopy.  A YAW syringe was used to advance the needle into the epidural space.   After loss of resistance, there was no evidence of blood or CSF on aspiration. Location was verified with both AP and lateral fluoroscopic imaging.    A total of 4ml of Omnipaque 300 was injected demonstrating appropriate epidural spread and was checked in both the AP and lateral views.  6ml was wasted. There was no evidence of intravascular or intrathecal spread.    2 ml of 0.5% bupivacaine with 10mg of dexamethasone and 2ml of preservative free saline was injected.  The needle was removed from the epidural space, flushed with 1% lidocaine and removed.     Hemostasis was achieved, the area was cleaned, and bandaids were placed when appropriate.  The patient tolerated the procedure well, and was taken to the recovery room.    Images were saved to PACS.    Post-procedure pain score: 2/10  Follow-up includes:   -f/u with referring provider    Savanna Eckert MD  [facility]

## 2021-07-28 ENCOUNTER — TRANSFERRED RECORDS (OUTPATIENT)
Dept: HEALTH INFORMATION MANAGEMENT | Facility: CLINIC | Age: 61
End: 2021-07-28

## 2021-08-04 ENCOUNTER — MYC MEDICAL ADVICE (OUTPATIENT)
Dept: FAMILY MEDICINE | Facility: CLINIC | Age: 61
End: 2021-08-04

## 2021-08-05 DIAGNOSIS — G47.9 DISTURBANCE IN SLEEP BEHAVIOR: ICD-10-CM

## 2021-08-05 DIAGNOSIS — F41.8 MIXED ANXIETY DEPRESSIVE DISORDER: Chronic | ICD-10-CM

## 2021-08-07 RX ORDER — FAMOTIDINE 20 MG/1
20 TABLET, FILM COATED ORAL 2 TIMES DAILY
Qty: 180 TABLET | Refills: 2 | Status: SHIPPED | OUTPATIENT
Start: 2021-08-07 | End: 2022-05-03

## 2021-08-07 NOTE — TELEPHONE ENCOUNTER
Routing RX refill requests to Provider because one is not on the RN protocol and there is a drug interaction alert.  Dhruv Britt, RN

## 2021-08-09 RX ORDER — TRAZODONE HYDROCHLORIDE 50 MG/1
50-100 TABLET, FILM COATED ORAL
Qty: 180 TABLET | Refills: 3 | Status: SHIPPED | OUTPATIENT
Start: 2021-08-09 | End: 2022-05-04

## 2021-08-09 RX ORDER — PAROXETINE 10 MG/1
10 TABLET, FILM COATED ORAL EVERY MORNING
Qty: 90 TABLET | Refills: 1 | Status: SHIPPED | OUTPATIENT
Start: 2021-08-09 | End: 2022-01-13

## 2021-08-16 DIAGNOSIS — F41.8 MIXED ANXIETY DEPRESSIVE DISORDER: Chronic | ICD-10-CM

## 2021-08-16 DIAGNOSIS — M79.18 LUMBAR MUSCLE PAIN: ICD-10-CM

## 2021-08-17 RX ORDER — METHOCARBAMOL 750 MG/1
TABLET, FILM COATED ORAL
Qty: 120 TABLET | Refills: 1 | Status: SHIPPED | OUTPATIENT
Start: 2021-08-17 | End: 2021-11-08

## 2021-08-17 NOTE — TELEPHONE ENCOUNTER
Signed Prescriptions:                        Disp   Refills    methocarbamol (ROBAXIN) 750 MG tablet      120 ta*1        Sig: TAKE ONE to Two TABLETs BY MOUTH FOUR TIMES A DAY as           needed  Authorizing Provider: MARYCRUZ MCDERMOTT, RN CNP, FNP  [facility]  [facility] [place], [place]

## 2021-08-17 NOTE — TELEPHONE ENCOUNTER
Received fax request from [place] [facility] requesting refill(s) for methocarbamol (ROBAXIN) 750 MG tablet    Last refilled on 07/22/21    Pt last seen on 06/30/21  Next appt scheduled for : none    Will facilitate refill.

## 2021-08-18 RX ORDER — LORAZEPAM 1 MG/1
TABLET ORAL
Qty: 15 TABLET | Refills: 0 | Status: SHIPPED | OUTPATIENT
Start: 2021-08-18 | End: 2021-09-28

## 2021-09-14 ENCOUNTER — MYC MEDICAL ADVICE (OUTPATIENT)
Dept: PALLIATIVE MEDICINE | Facility: CLINIC | Age: 61
End: 2021-09-14

## 2021-09-14 DIAGNOSIS — M51.369 DDD (DEGENERATIVE DISC DISEASE), LUMBAR: ICD-10-CM

## 2021-09-14 DIAGNOSIS — M54.16 LUMBAR RADICULOPATHY: Primary | ICD-10-CM

## 2021-09-15 NOTE — TELEPHONE ENCOUNTER
From: Zac Poe      Created: 9/14/2021 1:21 PM        *-*-*This message has not been handled.*-*-*    Hello,  I was seen by Dr Alex Holliday today for my back pain, it is on MyChart in [facility]. We discussed surgery and since I had some relief with the last injection, doing another one first. Do you think that I can receive another one this month and what is their schedule?  Thank You,  Zac Poe  [phone]          From: Raheem Alcaraz RN      Created: 9/15/2021 3:10 PM        Zac, poonam had a L3-4 interlaminar epidural steroid injection on 7/19/21.       What percentage of pain relief did you get from this injection and for how long did you receive relief.  Insurance requires this information     Thanks     Raheem Alcaraz RN  Patient Care Supervisor   [facility]         7/19/21-L3-4 interlaminar epidural steroid injection     Will await correspondence     Raheem Alcaraz RN  Patient Care Supervisor   [facility]

## 2021-09-16 NOTE — TELEPHONE ENCOUNTER
Per patient Norahart message:  From: Zac Poe      Created: 9/15/2021 7:08 PM      Hello  I have about 40% relief from the last injection and before opting surgery the Dr suggested one more shot.  If I can I would like to try that first.  I still am not  back to activity I could do one year ago and still feel debilitating pain in back and left foot.  Please let me know if I can try again, or what will relieve the pain.  Thank You  Zac  [phone]        Routed to provider.     Frida, RN-BSN  [facility]

## 2021-09-17 NOTE — TELEPHONE ENCOUNTER
Marco A Nichols I signed the order for the repeat L3-4 injection. Our office will contact you to get this scheduled.      Thanks for reaching out.      Jenna MOSLEY RN CNP, LOLISP  [facility]    I have sent the above YaKlasst message to the patient.     Jenna MOSLEY RN CNP, ERIN  [facility]

## 2021-09-18 ENCOUNTER — HEALTH MAINTENANCE LETTER (OUTPATIENT)
Age: 61
End: 2021-09-18

## 2021-09-27 DIAGNOSIS — F41.8 MIXED ANXIETY DEPRESSIVE DISORDER: Chronic | ICD-10-CM

## 2021-09-28 RX ORDER — LORAZEPAM 1 MG/1
TABLET ORAL
Qty: 15 TABLET | Refills: 0 | Status: SHIPPED | OUTPATIENT
Start: 2021-09-28 | End: 2021-11-09

## 2021-11-06 DIAGNOSIS — F41.8 MIXED ANXIETY DEPRESSIVE DISORDER: Chronic | ICD-10-CM

## 2021-11-08 DIAGNOSIS — M79.18 LUMBAR MUSCLE PAIN: ICD-10-CM

## 2021-11-08 RX ORDER — METHOCARBAMOL 750 MG/1
TABLET, FILM COATED ORAL
Qty: 120 TABLET | Refills: 1 | Status: SHIPPED | OUTPATIENT
Start: 2021-11-08 | End: 2022-03-28

## 2021-11-08 NOTE — TELEPHONE ENCOUNTER
Received fax request from [place] pharmacy requesting refill(s) for methocarbamol (ROBAXIN) 750 MG tablet    Last refilled on 09/14/21    Pt last seen on 06/30/21  Next appt scheduled for : none    Will facilitate refill.

## 2021-11-08 NOTE — TELEPHONE ENCOUNTER
Signed Prescriptions:                        Disp   Refills    methocarbamol (ROBAXIN) 750 MG tablet      120 ta*1        Sig: TAKE ONE to Two TABLETs BY MOUTH FOUR TIMES A DAY as           needed  Authorizing Provider: MARYCRUZ MCDERMOTT, RN CNP, FNP  [facility]  [facility] [place]

## 2021-11-09 RX ORDER — LORAZEPAM 1 MG/1
TABLET ORAL
Qty: 15 TABLET | Refills: 0 | Status: SHIPPED | OUTPATIENT
Start: 2021-11-09 | End: 2021-12-28

## 2021-11-09 NOTE — TELEPHONE ENCOUNTER
Prescription faxed to pharmacy.      PDMP Review       Value Time User    State PDMP site checked  Yes 11/9/2021 10:34 AM Parrish Sears MD

## 2021-11-09 NOTE — TELEPHONE ENCOUNTER
Pending Prescriptions:                       Disp   Refills    LORazepam (ATIVAN) 1 MG tablet [Pharmacy *15 tab*0            Sig: TAKE ONE-HALF TABLET BY MOUTH EVERY 8 HOURS AS           NEEDED FOR ANXIETY    Routing refill request to provider for review/approval because:  Drug not on the [facility] refill protocol     Jero Li RN

## 2021-12-27 DIAGNOSIS — E78.5 HYPERLIPIDEMIA LDL GOAL <130: Chronic | ICD-10-CM

## 2021-12-29 RX ORDER — ATORVASTATIN CALCIUM 40 MG/1
40 TABLET, FILM COATED ORAL DAILY
Qty: 90 TABLET | Refills: 0 | Status: SHIPPED | OUTPATIENT
Start: 2021-12-29 | End: 2022-04-14

## 2021-12-29 NOTE — TELEPHONE ENCOUNTER
"Requested Prescriptions   Pending Prescriptions Disp Refills     atorvastatin (LIPITOR) 40 MG tablet 90 tablet 2     Sig: Take 1 tablet (40 mg) by mouth daily       Statins Protocol Failed - 12/27/2021  5:18 PM        Failed - LDL on file in past 12 months     Recent Labs   Lab Test 09/04/20  1048   LDL 36             Passed - No abnormal creatine kinase in past 12 months     No lab results found.             Passed - Recent (12 mo) or future (30 days) visit within the authorizing provider's specialty     Patient has had an office visit with the authorizing provider or a provider within the authorizing providers department within the previous 12 mos or has a future within next 30 days. See \"Patient Info\" tab in inbasket, or \"Choose Columns\" in Meds & Orders section of the refill encounter.              Passed - Medication is active on med list        Passed - Patient is age 18 or older             "

## 2022-01-05 DIAGNOSIS — F41.8 MIXED ANXIETY DEPRESSIVE DISORDER: Chronic | ICD-10-CM

## 2022-01-10 ENCOUNTER — MYC MEDICAL ADVICE (OUTPATIENT)
Dept: FAMILY MEDICINE | Facility: CLINIC | Age: 62
End: 2022-01-10
Payer: COMMERCIAL

## 2022-01-10 NOTE — TELEPHONE ENCOUNTER
"Requested Prescriptions   Pending Prescriptions Disp Refills     PARoxetine (PAXIL) 10 MG tablet 90 tablet 1     Sig: Take 1 tablet (10 mg) by mouth every morning       SSRIs Protocol Failed - 1/7/2022 12:18 PM        Failed - PHQ-9 score less than 5 in past 6 months     Please review last PHQ-9 score.           Failed - Recent (6 mo) or future (30 days) visit within the authorizing provider's specialty     Patient had office visit in the last 6 months or has a visit in the next 30 days with authorizing provider or within the authorizing provider's specialty.  See \"Patient Info\" tab in inbasket, or \"Choose Columns\" in Meds & Orders section of the refill encounter.            Passed - Medication is active on med list        Passed - Patient is age 18 or older             "

## 2022-01-12 ASSESSMENT — PATIENT HEALTH QUESTIONNAIRE - PHQ9
SUM OF ALL RESPONSES TO PHQ QUESTIONS 1-9: 1
5. POOR APPETITE OR OVEREATING: SEVERAL DAYS

## 2022-01-12 ASSESSMENT — ANXIETY QUESTIONNAIRES
5. BEING SO RESTLESS THAT IT IS HARD TO SIT STILL: NOT AT ALL
3. WORRYING TOO MUCH ABOUT DIFFERENT THINGS: NOT AT ALL
GAD7 TOTAL SCORE: 3
6. BECOMING EASILY ANNOYED OR IRRITABLE: NOT AT ALL
7. FEELING AFRAID AS IF SOMETHING AWFUL MIGHT HAPPEN: SEVERAL DAYS
1. FEELING NERVOUS, ANXIOUS, OR ON EDGE: SEVERAL DAYS
IF YOU CHECKED OFF ANY PROBLEMS ON THIS QUESTIONNAIRE, HOW DIFFICULT HAVE THESE PROBLEMS MADE IT FOR YOU TO DO YOUR WORK, TAKE CARE OF THINGS AT HOME, OR GET ALONG WITH OTHER PEOPLE: SOMEWHAT DIFFICULT
2. NOT BEING ABLE TO STOP OR CONTROL WORRYING: NOT AT ALL

## 2022-01-12 NOTE — TELEPHONE ENCOUNTER
Dr. Sears,    PHQ/JACOB completed, scores are 1/3 today. [place], made aware needs appointment, please advise refill, would like for 6 months. Do you need patient to schedule a telephone or virtual appointment while away or ok to wait till patient returns to [place]?    YARELI Hector

## 2022-01-12 NOTE — TELEPHONE ENCOUNTER
Dr. Sears.    Routing refill request to provider for review/approval because:  PHQ-9 greater than 5. Danielle MCMAHON RN

## 2022-01-13 ENCOUNTER — TELEPHONE (OUTPATIENT)
Dept: FAMILY MEDICINE | Facility: CLINIC | Age: 62
End: 2022-01-13
Payer: COMMERCIAL

## 2022-01-13 RX ORDER — PAROXETINE 10 MG/1
10 TABLET, FILM COATED ORAL EVERY MORNING
Qty: 90 TABLET | Refills: 1 | Status: SHIPPED | OUTPATIENT
Start: 2022-01-13 | End: 2022-05-04

## 2022-01-13 RX ORDER — PAROXETINE 10 MG/1
10 TABLET, FILM COATED ORAL EVERY MORNING
Qty: 90 TABLET | Refills: 1 | Status: SHIPPED | OUTPATIENT
Start: 2022-01-13 | End: 2022-01-13

## 2022-01-13 ASSESSMENT — ANXIETY QUESTIONNAIRES: GAD7 TOTAL SCORE: 3

## 2022-01-13 NOTE — TELEPHONE ENCOUNTER
Given that symptoms are well controlled okay to just refill this and have him see me when he gets back.  Prescription faxed to pharmacy.

## 2022-01-13 NOTE — TELEPHONE ENCOUNTER
Pharmacy was called, they received new paxil order and it will be mailed out. Message left on pt VM.   Danielle Zhao RN

## 2022-01-20 ENCOUNTER — TRANSFERRED RECORDS (OUTPATIENT)
Dept: HEALTH INFORMATION MANAGEMENT | Facility: CLINIC | Age: 62
End: 2022-01-20
Payer: COMMERCIAL

## 2022-02-01 ENCOUNTER — TRANSFERRED RECORDS (OUTPATIENT)
Dept: HEALTH INFORMATION MANAGEMENT | Facility: CLINIC | Age: 62
End: 2022-02-01
Payer: COMMERCIAL

## 2022-02-05 DIAGNOSIS — F41.8 MIXED ANXIETY DEPRESSIVE DISORDER: Chronic | ICD-10-CM

## 2022-02-09 RX ORDER — LORAZEPAM 1 MG/1
TABLET ORAL
Qty: 15 TABLET | Refills: 0 | Status: SHIPPED | OUTPATIENT
Start: 2022-02-09 | End: 2022-03-23

## 2022-02-27 ENCOUNTER — HEALTH MAINTENANCE LETTER (OUTPATIENT)
Age: 62
End: 2022-02-27

## 2022-03-21 ENCOUNTER — TRANSFERRED RECORDS (OUTPATIENT)
Dept: HEALTH INFORMATION MANAGEMENT | Facility: CLINIC | Age: 62
End: 2022-03-21
Payer: COMMERCIAL

## 2022-03-21 DIAGNOSIS — F41.8 MIXED ANXIETY DEPRESSIVE DISORDER: Chronic | ICD-10-CM

## 2022-03-21 NOTE — TELEPHONE ENCOUNTER
Routing refill request to provider for review/approval because:  Drug not on the [facility] refill protocol     Pending Prescriptions:                       Disp   Refills    LORazepam (ATIVAN) 1 MG tablet [Pharmacy M*15 tab*0        Sig: TAKE ONE-HALF TABLET BY MOUTH EVERY 8 HOURS AS NEEDED           FOR ANXIETY    Cat Kilpatrick RN on 3/21/2022 at 4:43 PM

## 2022-03-23 RX ORDER — LORAZEPAM 1 MG/1
TABLET ORAL
Qty: 15 TABLET | Refills: 0 | Status: SHIPPED | OUTPATIENT
Start: 2022-03-23 | End: 2022-03-28

## 2022-03-25 DIAGNOSIS — F41.8 MIXED ANXIETY DEPRESSIVE DISORDER: Chronic | ICD-10-CM

## 2022-03-28 DIAGNOSIS — M79.18 LUMBAR MUSCLE PAIN: ICD-10-CM

## 2022-03-28 RX ORDER — LORAZEPAM 1 MG/1
0.5 TABLET ORAL EVERY 8 HOURS PRN
Qty: 15 TABLET | Refills: 0 | Status: SHIPPED | OUTPATIENT
Start: 2022-03-28 | End: 2022-05-04

## 2022-03-28 RX ORDER — METHOCARBAMOL 750 MG/1
750-1500 TABLET, FILM COATED ORAL 4 TIMES DAILY PRN
Qty: 120 TABLET | Refills: 1 | Status: SHIPPED | OUTPATIENT
Start: 2022-03-28 | End: 2022-04-29

## 2022-03-28 NOTE — TELEPHONE ENCOUNTER
Signed Prescriptions:                        Disp   Refills    methocarbamol (ROBAXIN) 750 MG tablet      120 ta*1        Sig: Take 1-2 tablets (750-1,500 mg) by mouth 4 times           daily as needed for muscle spasms For muscle           spasms. MUST MAKE/KEEP APPT PRIOR TO NEXT REFILL.  Authorizing Provider: MARYCRUZ MCDERMOTT, RN CNP, FNP  [facility]  [facility] [place]

## 2022-03-28 NOTE — TELEPHONE ENCOUNTER
Received fax request from [facility] pharmacy requesting refill(s) for methocarbamol (ROBAXIN) 750 MG tablet    Last refilled on 12/27/21    Pt last seen on 6/30/21  Next appt scheduled for none    Will facilitate refill.

## 2022-04-26 ENCOUNTER — VIRTUAL VISIT (OUTPATIENT)
Dept: PALLIATIVE MEDICINE | Facility: CLINIC | Age: 62
End: 2022-04-26
Payer: COMMERCIAL

## 2022-04-26 DIAGNOSIS — M47.816 SPONDYLOSIS OF LUMBAR REGION WITHOUT MYELOPATHY OR RADICULOPATHY: ICD-10-CM

## 2022-04-26 DIAGNOSIS — M54.59 LUMBAR FACET JOINT PAIN: ICD-10-CM

## 2022-04-26 DIAGNOSIS — G89.29 CHRONIC BILATERAL LOW BACK PAIN WITHOUT SCIATICA: ICD-10-CM

## 2022-04-26 DIAGNOSIS — M54.50 CHRONIC BILATERAL LOW BACK PAIN WITHOUT SCIATICA: ICD-10-CM

## 2022-04-26 DIAGNOSIS — G90.522 COMPLEX REGIONAL PAIN SYNDROME TYPE 1 OF LEFT LOWER EXTREMITY: Primary | ICD-10-CM

## 2022-04-26 DIAGNOSIS — F11.90 CHRONIC, CONTINUOUS USE OF OPIOIDS: ICD-10-CM

## 2022-04-26 DIAGNOSIS — M79.672 LEFT FOOT PAIN: ICD-10-CM

## 2022-04-26 PROCEDURE — 99214 OFFICE O/P EST MOD 30 MIN: CPT | Mod: 95 | Performed by: NURSE PRACTITIONER

## 2022-04-26 RX ORDER — BUPRENORPHINE 5 UG/H
1 PATCH TRANSDERMAL
Qty: 4 PATCH | Refills: 0 | Status: SHIPPED | OUTPATIENT
Start: 2022-04-26 | End: 2022-05-03

## 2022-04-26 ASSESSMENT — PAIN SCALES - GENERAL: PAINLEVEL: SEVERE PAIN (6)

## 2022-04-26 NOTE — PATIENT INSTRUCTIONS
Plan:   Physical Therapy:  None at present time  Clinical Health Psychologist: Zac is working with an outside therapist with [facility], I am in support of this  Diagnostic Studies:  None  Medication Management:    Methocarbamol as needed per script for muscle spasm  Continue CBD cream  Voltaren gel 1% is also OK to use over-the-counter on your left foot and the right shoulder  START BUTRANS (buprenorphine) 5mcg/hr transdermal patch. Place on clean dry skin on upper chest, upper arm or upper back. Wear for 7 days, then remove patch and place new patch in new location every 7 days.   Further procedures recommended: none  Recommendations to PCP: see above  Follow up: 4-5 weeks in-person in clinic visit.  Please call [phone] to make your follow-up appointment with me.       Administration of Butrans    Butrans is for transdermal use (on intact skin) only.   Each Butrans patch is intended to be worn for 7 days  Do not to use Butrans if the pouch seal is broken or the patch is cut, damaged, or changed in any way  Do not to cut the Butrans patch  Apply immediately after removal from the individually sealed pouch  Apply Butrans to the upper outer arm, upper chest, upper back or the side of the chest. These 4 sites (each present on both sides of the body) provide 8 possible application sites. Rotate Butrans among the 8 described skin sites. After Butrans removal, wait a minimum of 21 days before reapplying to the same skin site          For the use of 2 patches, remove your current patch, and apply the 2 new patches adjacent to one another at a different application site  Apply Butrans to a hairless or nearly hairless skin site.   If none are available, the hair at the site should be clipped, not shaven.   Do not apply Butrans to irritated skin.   If the application site must be cleaned, clean the site with water only. Do not use soaps, alcohol, oils, lotions, or abrasive devices.   Allow the skin to dry before applying  Butrans  It is OK to bathe or shower with the patch on. Lightly pat it dry.   If problems with adhesion of Butrans occur, the edges may be taped with first aid tape.   If problems with lack of adhesion continue, the patch may be covered with waterproof or semipermeable adhesive dressings (Tegaderm) suitable for 7 days of wear.   If Butrans falls off during the 7-day dosing interval, dispose of the transdermal system properly and place a new Butrans patch on at a different skin site.   Dispose of used patches by folding the adhesive side of the patch to itself, then flushing the patch down the toilet immediately upon removal.   Unused patches should be removed from their pouches, the protective liners removed, the patches folded so that the adhesive side of the patch adheres to itself, and immediately flushed down the toilet  Dispose of any patches remaining from a prescription as soon as they are no longer needed    Butrans is supplied in cartons containing 4 individually packaged systems and   a pouch containing 4 Patch-Disposal Units         ---------------------------------------------    Clinic Number:  [phone]   Call with any questions about your care and for scheduling assistance.   Calls are returned Monday through Friday between 8 AM and 4:30 PM. We usually get back to you within 2 business days depending on the issue/request.    If we are prescribing your medications:  For opioid medication refills, call the clinic or send a [employer] message 7 days in advance.  Please include:  Name of requested medication  Name of the pharmacy.  For non-opioid medications, call your pharmacy directly to request a refill. Please allow 3-4 days to be processed.   Per [place] State Law:  All controlled substance prescriptions must be filled within 30 days of being written.    For those controlled substances allowing refills, pickup must occur within 30 days of last fill.      We believe regular attendance is key to your  success in our program!    Any time you are unable to keep your appointment we ask that you call us at least 24 hours in advance to cancel.This will allow us to offer the appointment time to another patient.   Multiple missed appointments may lead to dismissal from the clinic.
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
## 2022-04-28 ENCOUNTER — MYC MEDICAL ADVICE (OUTPATIENT)
Dept: PALLIATIVE MEDICINE | Facility: CLINIC | Age: 62
End: 2022-04-28
Payer: COMMERCIAL

## 2022-04-29 ENCOUNTER — MYC REFILL (OUTPATIENT)
Dept: PALLIATIVE MEDICINE | Facility: CLINIC | Age: 62
End: 2022-04-29
Payer: COMMERCIAL

## 2022-04-29 ENCOUNTER — MYC REFILL (OUTPATIENT)
Dept: FAMILY MEDICINE | Facility: CLINIC | Age: 62
End: 2022-04-29
Payer: COMMERCIAL

## 2022-04-29 DIAGNOSIS — F11.90 CHRONIC, CONTINUOUS USE OF OPIOIDS: ICD-10-CM

## 2022-04-29 DIAGNOSIS — M79.672 LEFT FOOT PAIN: ICD-10-CM

## 2022-04-29 DIAGNOSIS — E78.5 HYPERLIPIDEMIA LDL GOAL <130: Chronic | ICD-10-CM

## 2022-04-29 DIAGNOSIS — M54.59 LUMBAR FACET JOINT PAIN: ICD-10-CM

## 2022-04-29 DIAGNOSIS — F41.8 MIXED ANXIETY DEPRESSIVE DISORDER: Chronic | ICD-10-CM

## 2022-04-29 DIAGNOSIS — M47.816 SPONDYLOSIS OF LUMBAR REGION WITHOUT MYELOPATHY OR RADICULOPATHY: ICD-10-CM

## 2022-04-29 DIAGNOSIS — M54.50 CHRONIC BILATERAL LOW BACK PAIN WITHOUT SCIATICA: ICD-10-CM

## 2022-04-29 DIAGNOSIS — G89.29 CHRONIC BILATERAL LOW BACK PAIN WITHOUT SCIATICA: ICD-10-CM

## 2022-04-29 DIAGNOSIS — M79.18 LUMBAR MUSCLE PAIN: ICD-10-CM

## 2022-04-29 DIAGNOSIS — G90.522 COMPLEX REGIONAL PAIN SYNDROME TYPE 1 OF LEFT LOWER EXTREMITY: ICD-10-CM

## 2022-04-29 RX ORDER — BUPRENORPHINE 5 UG/H
1 PATCH TRANSDERMAL
Qty: 4 PATCH | Refills: 0 | Status: CANCELLED | OUTPATIENT
Start: 2022-04-29

## 2022-04-29 RX ORDER — LORAZEPAM 1 MG/1
0.5 TABLET ORAL EVERY 8 HOURS PRN
Qty: 15 TABLET | Refills: 0 | Status: CANCELLED | OUTPATIENT
Start: 2022-04-29

## 2022-04-29 RX ORDER — ATORVASTATIN CALCIUM 40 MG/1
40 TABLET, FILM COATED ORAL DAILY
Qty: 90 TABLET | Refills: 0 | Status: SHIPPED | OUTPATIENT
Start: 2022-04-29 | End: 2022-05-26

## 2022-04-29 RX ORDER — ATORVASTATIN CALCIUM 40 MG/1
TABLET, FILM COATED ORAL
Qty: 90 TABLET | Refills: 0 | Status: CANCELLED | OUTPATIENT
Start: 2022-04-29

## 2022-04-29 RX ORDER — LORAZEPAM 1 MG/1
0.5 TABLET ORAL EVERY 8 HOURS PRN
Qty: 15 TABLET | Refills: 0 | OUTPATIENT
Start: 2022-04-29

## 2022-04-29 RX ORDER — METHOCARBAMOL 750 MG/1
750-1500 TABLET, FILM COATED ORAL 4 TIMES DAILY PRN
Qty: 120 TABLET | Refills: 1 | Status: SHIPPED | OUTPATIENT
Start: 2022-04-29 | End: 2022-09-27

## 2022-04-29 NOTE — TELEPHONE ENCOUNTER
Received fax request from [facility] - [place], [place] - [address] [facility], [address] pharmacy requesting refill(s) for methocarbamol (ROBAXIN) 750 MG tablet    Last refilled on 03/28/2022    Pt last seen on 04/26/2022  Next appt scheduled for NA    Will facilitate refill.    Tamika Huang MA  [facility] [place]

## 2022-04-29 NOTE — TELEPHONE ENCOUNTER
Signed Prescriptions:                        Disp   Refills    methocarbamol (ROBAXIN) 750 MG tablet      120 ta*1        Sig: Take 1-2 tablets (750-1,500 mg) by mouth 4 times           daily as needed for muscle spasms For muscle           spasms.  Authorizing Provider: MARYCRUZ MCDERMOTT, RN CNP, FNP  [facility]  [facility]

## 2022-05-02 ENCOUNTER — MYC MEDICAL ADVICE (OUTPATIENT)
Dept: RHEUMATOLOGY | Facility: CLINIC | Age: 62
End: 2022-05-02
Payer: COMMERCIAL

## 2022-05-02 DIAGNOSIS — G89.29 CHRONIC BILATERAL LOW BACK PAIN WITHOUT SCIATICA: ICD-10-CM

## 2022-05-02 DIAGNOSIS — M47.816 SPONDYLOSIS OF LUMBAR REGION WITHOUT MYELOPATHY OR RADICULOPATHY: ICD-10-CM

## 2022-05-02 DIAGNOSIS — F11.90 CHRONIC, CONTINUOUS USE OF OPIOIDS: ICD-10-CM

## 2022-05-02 DIAGNOSIS — M54.59 LUMBAR FACET JOINT PAIN: ICD-10-CM

## 2022-05-02 DIAGNOSIS — M54.50 CHRONIC BILATERAL LOW BACK PAIN WITHOUT SCIATICA: ICD-10-CM

## 2022-05-02 DIAGNOSIS — G90.522 COMPLEX REGIONAL PAIN SYNDROME TYPE 1 OF LEFT LOWER EXTREMITY: ICD-10-CM

## 2022-05-02 DIAGNOSIS — M79.672 LEFT FOOT PAIN: ICD-10-CM

## 2022-05-02 RX ORDER — BUPRENORPHINE 5 UG/H
1 PATCH TRANSDERMAL
Qty: 4 PATCH | Refills: 0 | Status: CANCELLED | OUTPATIENT
Start: 2022-05-02

## 2022-05-02 NOTE — TELEPHONE ENCOUNTER
Refills have been requested for the following medications:         buprenorphine (BUTRANS) 5 MCG/HR WK patch [Jenna CARLOS]     Preferred pharmacy: [facility] - [place], [place] - [address]

## 2022-05-03 RX ORDER — BUPRENORPHINE 5 UG/H
1 PATCH TRANSDERMAL
Qty: 4 PATCH | Refills: 0 | Status: SHIPPED | OUTPATIENT
Start: 2022-05-03 | End: 2022-06-24

## 2022-05-03 NOTE — TELEPHONE ENCOUNTER
Signed Prescriptions:                        Disp   Refills    buprenorphine (BUTRANS) 5 MCG/HR WK patch  4 patch0        Sig: Place 1 patch onto the skin every 7 days Fill           05/03/22 and start 05/03/22 for chronic pain. 28           day script.  Authorizing Provider: JENNA MCDERMOTT    Reviewed [place]  May 3, 2022- no concerning fills.    Jenna MOSLEY, RN CNP, FNP  [facility]  [facility]

## 2022-05-03 NOTE — TELEPHONE ENCOUNTER
This was sent to [facility] at patients 04/26/22 appointment. He needs it transferred to [facility]. Called pharmacy and cancelled script. Pended here for [facility].    SUSY RachelN, RN  Care Coordinator  [facility] [place] [facility] [place]

## 2022-05-04 DIAGNOSIS — G47.9 DISTURBANCE IN SLEEP BEHAVIOR: ICD-10-CM

## 2022-05-04 DIAGNOSIS — F41.8 MIXED ANXIETY DEPRESSIVE DISORDER: Chronic | ICD-10-CM

## 2022-05-04 RX ORDER — PAROXETINE 10 MG/1
10 TABLET, FILM COATED ORAL EVERY MORNING
Qty: 90 TABLET | Refills: 0 | Status: SHIPPED | OUTPATIENT
Start: 2022-05-04 | End: 2022-05-26

## 2022-05-04 RX ORDER — TRAZODONE HYDROCHLORIDE 50 MG/1
50-100 TABLET, FILM COATED ORAL
Qty: 180 TABLET | Refills: 0 | Status: SHIPPED | OUTPATIENT
Start: 2022-05-04 | End: 2022-05-26

## 2022-05-04 RX ORDER — LORAZEPAM 1 MG/1
0.5 TABLET ORAL EVERY 8 HOURS PRN
Qty: 15 TABLET | Refills: 0 | Status: SHIPPED | OUTPATIENT
Start: 2022-05-04 | End: 2022-06-23

## 2022-05-04 NOTE — TELEPHONE ENCOUNTER
Sent a my chart message to patient letting them know that their  buprenorphine (BUTRANS) 5 MCG/HR WK patch  4 patch0        Sig: Place 1 patch onto the skin every 7 days Fill           05/03/22 and start 05/03/22 for chronic pain. 28           day script. Has been sent over to the pharmacy   buprenorphine (BUTRANS) 5 MCG/HR WK patch  4 patch has been sent over to the pharmacy [facility] - [facility] [place] - [address], [address]      Tamika Huang [facility] [place]

## 2022-05-04 NOTE — TELEPHONE ENCOUNTER
Ativan already faxed.  CPAP order needs to go through the sleep clinic.  This is a duplicate note.  We will close this.

## 2022-05-05 NOTE — TELEPHONE ENCOUNTER
This  Has been addressed in another encounter.      Frida RN-BSN  [facility]

## 2022-05-05 NOTE — TELEPHONE ENCOUNTER
His has been addressed.  See the 4/29/22 mychart encounter for more information.    Frida Finney RN-BSN  [facility]

## 2022-05-12 DIAGNOSIS — F41.8 MIXED ANXIETY DEPRESSIVE DISORDER: Chronic | ICD-10-CM

## 2022-05-12 NOTE — TELEPHONE ENCOUNTER
Routing refill request to provider for review/approval because:  Drug not on the [facility] refill protocol     Pending Prescriptions:                       Disp   Refills    LORazepam (ATIVAN) 1 MG tablet [Pharmacy M*15 tab*         Sig: TAKE ONE-HALF TABLET BY  MOUTH EVERY 8 HOURS AS            NEEDED FOR ANXIETY    Cat Kilpatrick RN on 5/12/2022 at 11:16 AM

## 2022-05-16 NOTE — TELEPHONE ENCOUNTER
This request in his mychart has already been completed. Will close encounter.     Linette Balderas RN BSN PHN

## 2022-05-17 RX ORDER — LORAZEPAM 1 MG/1
TABLET ORAL
Qty: 15 TABLET | OUTPATIENT
Start: 2022-05-17

## 2022-05-17 NOTE — TELEPHONE ENCOUNTER
Patient does not need this as it still has yet to arrive from [facility].     Linette Balderas RN BSN PHN

## 2022-05-18 ENCOUNTER — TELEPHONE (OUTPATIENT)
Dept: DERMATOLOGY | Facility: CLINIC | Age: 62
End: 2022-05-18
Payer: COMMERCIAL

## 2022-05-18 NOTE — TELEPHONE ENCOUNTER
[facility] Call Center    Phone Message    May a detailed message be left on voicemail: yes     Reason for Call: Symptoms or Concerns     If patient has red-flag symptoms, warm transfer to triage line    Current symptom or concern:   growth under his eye    Symptoms have been present for:  2 month(s)    Has patient previously been seen for this? No    By : NA    Date: NA    Are there any new or worsening symptoms? Yes: It is growing. Pt scheduled the soonest Appt 08/22 and wait listed. Pt does not believe he should wait that long. Pt has a Hx of spot removals and is unsure if they were cancer. Please review and call Pt to discuss. Thanks       Action Taken: Message routed to:  [facility] ([facility] Derm    Travel Screening: Not Applicable

## 2022-05-18 NOTE — TELEPHONE ENCOUNTER
Spoke with pt and he stated he has a growth under his eye that is increasing in size in the last couple of weeks. 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 Appt was made for pt.  Leonora CAMARA RN BSN PHN  Specialty Clinics

## 2022-05-24 ENCOUNTER — OFFICE VISIT (OUTPATIENT)
Dept: DERMATOLOGY | Facility: CLINIC | Age: 62
End: 2022-05-24
Payer: COMMERCIAL

## 2022-05-24 VITALS — HEART RATE: 70 BPM | SYSTOLIC BLOOD PRESSURE: 139 MMHG | OXYGEN SATURATION: 98 % | DIASTOLIC BLOOD PRESSURE: 85 MMHG

## 2022-05-24 DIAGNOSIS — D23.9 DERMAL NEVUS: ICD-10-CM

## 2022-05-24 DIAGNOSIS — D18.01 ANGIOMA OF SKIN: ICD-10-CM

## 2022-05-24 DIAGNOSIS — L82.1 SEBORRHEIC KERATOSIS: ICD-10-CM

## 2022-05-24 DIAGNOSIS — L72.0 EPIDERMAL CYST: ICD-10-CM

## 2022-05-24 DIAGNOSIS — L81.4 LENTIGO: Primary | ICD-10-CM

## 2022-05-24 PROCEDURE — 11441 EXC FACE-MM B9+MARG 0.6-1 CM: CPT | Performed by: DERMATOLOGY

## 2022-05-24 PROCEDURE — 99203 OFFICE O/P NEW LOW 30 MIN: CPT | Mod: 25 | Performed by: DERMATOLOGY

## 2022-05-24 PROCEDURE — 12051 INTMD RPR FACE/MM 2.5 CM/<: CPT | Performed by: DERMATOLOGY
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
## 2022-05-24 NOTE — PATIENT INSTRUCTIONS
Wound Care Instructions     FOR SUPERFICIAL WOUNDS     [facility] [phone]    [facility] [phone]                       AFTER 24 HOURS YOU SHOULD REMOVE THE BANDAGE AND BEGIN DAILY DRESSING CHANGES AS FOLLOWS:     1) Remove Dressing.     2) Clean and dry the area with tap water using a Q-tip or sterile gauze pad.     3) Apply Vaseline, Aquaphor, Polysporin ointment or Bacitracin ointment over entire wound.  Do NOT use Neosporin ointment.     4) Cover the wound with a band-aid, or a sterile non-stick gauze pad and micropore paper tape      REPEAT THESE INSTRUCTIONS AT LEAST ONCE A DAY UNTIL THE WOUND HAS COMPLETELY HEALED.    It is an old wives tale that a wound heals better when it is exposed to air and allowed to dry out. The wound will heal faster with a better cosmetic result if it is kept moist with ointment and covered with a bandage.    **Do not let the wound dry out.**      Supplies Needed:      *Cotton tipped applicators (Q-tips)    *Polysporin Ointment or Bacitracin Ointment (NOT NEOSPORIN)    *Band-aids or non-stick gauze pads and micropore paper tape.      PATIENT INFORMATION:    During the healing process you will notice a number of changes. All wounds develop a small halo of redness surrounding the wound.  This means healing is occurring. Severe itching with extensive redness usually indicates sensitivity to the ointment or bandage tape used to dress the wound.  You should call our office if this develops.      Swelling  and/or discoloration around your surgical site is common, particularly when performed around the eye.    All wounds normally drain.  The larger the wound the more drainage there will be.  After 7-10 days, you will notice the wound beginning to shrink and new skin will begin to grow.  The wound is healed when you can see skin has formed over the entire area.  A healed wound has a healthy, shiny look to the surface and is red to dark pink in color  to normalize.  Wounds may take approximately 4-6 weeks to heal.  Larger wounds may take 6-8 weeks.  After the wound is healed you may discontinue dressing changes.    You may experience a sensation of tightness as your wound heals. This is normal and will gradually subside.    Your healed wound may be sensitive to temperature changes. This sensitivity improves with time, but if you re having a lot of discomfort, try to avoid temperature extremes.    Patients frequently experience itching after their wound appears to have healed because of the continue healing under the skin.  Plain Vaseline will help relieve the itching.        POSSIBLE COMPLICATIONS    BLEEDING:    Leave the bandage in place.  Use tightly rolled up gauze or a cloth to apply direct pressure over the bandage for 30  minutes.  Reapply pressure for an additional 30 minutes if necessary  Use additional gauze and tape to maintain pressure once the bleeding has stopped.
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
## 2022-05-26 ENCOUNTER — VIRTUAL VISIT (OUTPATIENT)
Dept: FAMILY MEDICINE | Facility: CLINIC | Age: 62
End: 2022-05-26
Payer: COMMERCIAL

## 2022-05-26 DIAGNOSIS — Z12.11 SCREEN FOR COLON CANCER: ICD-10-CM

## 2022-05-26 DIAGNOSIS — F41.8 MIXED ANXIETY DEPRESSIVE DISORDER: Chronic | ICD-10-CM

## 2022-05-26 DIAGNOSIS — E78.5 HYPERLIPIDEMIA LDL GOAL <130: Chronic | ICD-10-CM

## 2022-05-26 DIAGNOSIS — Z12.5 SCREENING FOR PROSTATE CANCER: Primary | ICD-10-CM

## 2022-05-26 DIAGNOSIS — G47.9 DISTURBANCE IN SLEEP BEHAVIOR: ICD-10-CM

## 2022-05-26 PROCEDURE — 99214 OFFICE O/P EST MOD 30 MIN: CPT | Mod: 95 | Performed by: FAMILY MEDICINE

## 2022-05-26 RX ORDER — PAROXETINE 10 MG/1
10 TABLET, FILM COATED ORAL EVERY MORNING
Qty: 90 TABLET | Refills: 4 | Status: SHIPPED | OUTPATIENT
Start: 2022-05-26 | End: 2024-06-17

## 2022-05-26 RX ORDER — TRAZODONE HYDROCHLORIDE 50 MG/1
50-100 TABLET, FILM COATED ORAL
Qty: 180 TABLET | Refills: 4 | Status: SHIPPED | OUTPATIENT
Start: 2022-05-26 | End: 2022-10-12

## 2022-05-26 RX ORDER — ATORVASTATIN CALCIUM 40 MG/1
40 TABLET, FILM COATED ORAL DAILY
Qty: 90 TABLET | Refills: 4 | Status: SHIPPED | OUTPATIENT
Start: 2022-05-26 | End: 2022-10-12

## 2022-05-26 ASSESSMENT — ANXIETY QUESTIONNAIRES
3. WORRYING TOO MUCH ABOUT DIFFERENT THINGS: SEVERAL DAYS
8. IF YOU CHECKED OFF ANY PROBLEMS, HOW DIFFICULT HAVE THESE MADE IT FOR YOU TO DO YOUR WORK, TAKE CARE OF THINGS AT HOME, OR GET ALONG WITH OTHER PEOPLE?: SOMEWHAT DIFFICULT
5. BEING SO RESTLESS THAT IT IS HARD TO SIT STILL: NOT AT ALL
GAD7 TOTAL SCORE: 2
GAD7 TOTAL SCORE: 2
7. FEELING AFRAID AS IF SOMETHING AWFUL MIGHT HAPPEN: NOT AT ALL
4. TROUBLE RELAXING: NOT AT ALL
7. FEELING AFRAID AS IF SOMETHING AWFUL MIGHT HAPPEN: NOT AT ALL
2. NOT BEING ABLE TO STOP OR CONTROL WORRYING: NOT AT ALL
6. BECOMING EASILY ANNOYED OR IRRITABLE: NOT AT ALL
1. FEELING NERVOUS, ANXIOUS, OR ON EDGE: SEVERAL DAYS
GAD7 TOTAL SCORE: 2

## 2022-05-26 ASSESSMENT — PATIENT HEALTH QUESTIONNAIRE - PHQ9
10. IF YOU CHECKED OFF ANY PROBLEMS, HOW DIFFICULT HAVE THESE PROBLEMS MADE IT FOR YOU TO DO YOUR WORK, TAKE CARE OF THINGS AT HOME, OR GET ALONG WITH OTHER PEOPLE: SOMEWHAT DIFFICULT
SUM OF ALL RESPONSES TO PHQ QUESTIONS 1-9: 1
SUM OF ALL RESPONSES TO PHQ QUESTIONS 1-9: 1

## 2022-05-26 NOTE — PROGRESS NOTES
"Zac is a 62 year old who is being evaluated via a billable video visit.      How would you like to obtain your AVS? MyChart  If the video visit is dropped, the invitation should be resent by: Text to cell phone: [phone]  Will anyone else be joining your video visit? No    Video Start Time: 14:15    Assessment & Plan     Hyperlipidemia LDL goal <130  Well controlled. Refilled medication. Check labs.    - atorvastatin (LIPITOR) 40 MG tablet; Take 1 tablet (40 mg) by mouth daily  - Lipid panel reflex to direct LDL Fasting; Future  - Comprehensive metabolic panel; Future    Mixed anxiety depressive disorder  Well controlled. Refilled medication.     - PARoxetine (PAXIL) 10 MG tablet; Take 1 tablet (10 mg) by mouth every morning    Disturbance in sleep behavior  Well controlled. Refilled medication.     - traZODone (DESYREL) 50 MG tablet; Take 1-2 tablets ( mg) by mouth nightly as needed for sleep    Screening for prostate cancer  - Prostate Specific Antigen Screen; Future    Screen for colon cancer  - Adult Gastro Ref - Procedure Only; Future             BMI:   Estimated body mass index is 30.59 kg/m  as calculated from the following:    Height as of 7/19/21: 1.822 m (5' 11.75\").    Weight as of 7/19/21: 101.6 kg (224 lb).           No follow-ups on file.    Parrish Sears MD  [facility]   Zac is a 62 year old who presents for the following health issues     History of Present Illness       Reason for visit:  Medication renewals, bloodwork tests    He eats 4 or more servings of fruits and vegetables daily.He consumes 0 sweetened beverage(s) daily.He exercises with enough effort to increase his heart rate 9 or less minutes per day.  He exercises with enough effort to increase his heart rate 3 or less days per week.   He is taking medications regularly.         Hyperlipidemia Follow-Up      Are you regularly taking any medication or supplement to lower your " cholesterol?   Yes- atorvastatin    Are you having muscle aches or other side effects that you think could be caused by your cholesterol lowering medication?  No    Depression and Anxiety Follow-Up    How are you doing with your depression since your last visit? Improved     How are you doing with your anxiety since your last visit?  Improved     Are you having other symptoms that might be associated with depression or anxiety? No    Have you had a significant life event? Health Concerns     Do you have any concerns with your use of alcohol or other drugs? No    Social History     Tobacco Use     Smoking status: Former Smoker     Packs/day: 1.00     Years: 15.00     Pack years: 15.00     Types: Cigarettes     Smokeless tobacco: Never Used     Tobacco comment: quit in 1999   Substance Use Topics     Alcohol use: Not Currently     Comment: not while on Paxil     Drug use: Yes     Types: Marijuana     Comment: medical use     PHQ 12/11/2020 1/12/2022 1/12/2022   PHQ-9 Total Score 4 - 1   Q9: Thoughts of better off dead/self-harm past 2 weeks Not at all Not at all Not at all     JACOB-7 SCORE 4/23/2020 12/11/2020 1/12/2022   Total Score - - -   Total Score 7 4 3     Last PHQ-9 1/12/2022   1.  Little interest or pleasure in doing things 0   2.  Feeling down, depressed, or hopeless 0   3.  Trouble falling or staying asleep, or sleeping too much 1   4.  Feeling tired or having little energy 0   5.  Poor appetite or overeating 0   6.  Feeling bad about yourself 0   7.  Trouble concentrating 0   8.  Moving slowly or restless 0   Q9: Thoughts of better off dead/self-harm past 2 weeks 0   PHQ-9 Total Score 1   Difficulty at work, home, or with people Somewhat difficult     JACOB-7  1/12/2022   1. Feeling nervous, anxious, or on edge 1   2. Not being able to stop or control worrying 0   3. Worrying too much about different things 0   4. Trouble relaxing 1   5. Being so restless that it is hard to sit still 0   6. Becoming easily  annoyed or irritable 0   7. Feeling afraid, as if something awful might happen 1   JACOB-7 Total Score 3   If you checked any problems, how difficult have they made it for you to do your work, take care of things at home, or get along with other people? Somewhat difficult       Suicide Assessment Five-step Evaluation and Treatment (SAFE-T)      Review of Systems   Constitutional, neuro, ENT, endocrine, pulmonary, cardiac, gastrointestinal, genitourinary, musculoskeletal, integument and psychiatric systems are negative, except as otherwise noted.       Objective           Vitals:  No vitals were obtained today due to virtual visit.    Physical Exam   GENERAL: Healthy, alert and no distress  EYES: Eyes grossly normal to inspection.  No discharge or erythema, or obvious scleral/conjunctival abnormalities.  RESP: No audible wheeze, cough, or visible cyanosis.  No visible retractions or increased work of breathing.    SKIN: Visible skin clear. No significant rash, abnormal pigmentation or lesions.  NEURO: Cranial nerves grossly intact.  Mentation and speech appropriate for age.  PSYCH: Mentation appears normal, affect normal/bright, judgement and insight intact, normal speech and appearance well-groomed.                Video-Visit Details    Type of service:  Video Visit    Video End Time:14:27    Originating Location (pt. Location): Home    Distant Location (provider location):  [facility]     Platform used for Video Visit: Best Five Reviewed

## 2022-05-26 NOTE — NURSING NOTE
"Initial There were no vitals taken for this visit. Estimated body mass index is 30.59 kg/m  as calculated from the following:    Height as of 7/19/21: 1.822 m (5' 11.75\").    Weight as of 7/19/21: 101.6 kg (224 lb). .      "

## 2022-05-26 NOTE — PATIENT INSTRUCTIONS
- [facility]/[place]: [phone]    At some point within the next year I would also recommend an in person visit for a full physical exam since that hasn't been done since 2019.

## 2022-05-31 ENCOUNTER — MYC MEDICAL ADVICE (OUTPATIENT)
Dept: FAMILY MEDICINE | Facility: CLINIC | Age: 62
End: 2022-05-31
Payer: COMMERCIAL

## 2022-06-22 DIAGNOSIS — F41.8 MIXED ANXIETY DEPRESSIVE DISORDER: Chronic | ICD-10-CM

## 2022-06-22 NOTE — TELEPHONE ENCOUNTER
Routing refill request to provider for review/approval because:  Drug not on the [facility] refill protocol     Pending Prescriptions:                       Disp   Refills    LORazepam (ATIVAN) 1 MG tablet [Pharmacy M*15 tab*         Sig: TAKE ONE-HALF TABLET BY  MOUTH EVERY 8 HOURS AS            NEEDED FOR ANXIETY    Cat Kilpatrick RN on 6/22/2022 at 3:06 PM

## 2022-06-23 RX ORDER — LORAZEPAM 1 MG/1
TABLET ORAL
Qty: 15 TABLET | Refills: 1 | Status: SHIPPED | OUTPATIENT
Start: 2022-06-23 | End: 2022-11-02

## 2022-06-24 ENCOUNTER — LAB (OUTPATIENT)
Dept: LAB | Facility: CLINIC | Age: 62
End: 2022-06-24
Payer: COMMERCIAL

## 2022-06-24 ENCOUNTER — OFFICE VISIT (OUTPATIENT)
Dept: PALLIATIVE MEDICINE | Facility: CLINIC | Age: 62
End: 2022-06-24

## 2022-06-24 VITALS — HEART RATE: 76 BPM | DIASTOLIC BLOOD PRESSURE: 72 MMHG | SYSTOLIC BLOOD PRESSURE: 118 MMHG

## 2022-06-24 DIAGNOSIS — M47.816 SPONDYLOSIS OF LUMBAR REGION WITHOUT MYELOPATHY OR RADICULOPATHY: ICD-10-CM

## 2022-06-24 DIAGNOSIS — G89.29 CHRONIC BILATERAL LOW BACK PAIN WITHOUT SCIATICA: ICD-10-CM

## 2022-06-24 DIAGNOSIS — G90.522 COMPLEX REGIONAL PAIN SYNDROME TYPE 1 OF LEFT LOWER EXTREMITY: Primary | ICD-10-CM

## 2022-06-24 DIAGNOSIS — F11.90 CHRONIC, CONTINUOUS USE OF OPIOIDS: ICD-10-CM

## 2022-06-24 DIAGNOSIS — M79.672 LEFT FOOT PAIN: ICD-10-CM

## 2022-06-24 DIAGNOSIS — Z79.891 ENCOUNTER FOR LONG-TERM USE OF OPIATE ANALGESIC: ICD-10-CM

## 2022-06-24 DIAGNOSIS — M54.59 LUMBAR FACET JOINT PAIN: ICD-10-CM

## 2022-06-24 DIAGNOSIS — M54.50 CHRONIC BILATERAL LOW BACK PAIN WITHOUT SCIATICA: ICD-10-CM

## 2022-06-24 LAB
CREAT UR-MCNC: 72 MG/DL
ETHANOL UR QL SCN: NORMAL

## 2022-06-24 PROCEDURE — 80321 ALCOHOLS BIOMARKERS 1OR 2: CPT | Mod: 90

## 2022-06-24 PROCEDURE — 80320 DRUG SCREEN QUANTALCOHOLS: CPT

## 2022-06-24 PROCEDURE — 80307 DRUG TEST PRSMV CHEM ANLYZR: CPT | Mod: 90

## 2022-06-24 PROCEDURE — 99214 OFFICE O/P EST MOD 30 MIN: CPT | Performed by: NURSE PRACTITIONER

## 2022-06-24 PROCEDURE — 99000 SPECIMEN HANDLING OFFICE-LAB: CPT

## 2022-06-24 PROCEDURE — 80307 DRUG TEST PRSMV CHEM ANLYZR: CPT

## 2022-06-24 RX ORDER — HYDROCODONE BITARTRATE AND ACETAMINOPHEN 5; 325 MG/1; MG/1
1 TABLET ORAL EVERY 6 HOURS PRN
Qty: 30 TABLET | Refills: 0 | Status: SHIPPED | OUTPATIENT
Start: 2022-06-24 | End: 2022-07-26

## 2022-06-24 ASSESSMENT — PAIN SCALES - GENERAL: PAINLEVEL: MODERATE PAIN (5)

## 2022-06-24 NOTE — PATIENT INSTRUCTIONS
Plan:   Physical Therapy:  None at present time  Clinical Health Psychologist: Zac is working with an outside therapist with [facility], I am in support of this  Diagnostic Studies:  None  Medication Management:    Methocarbamol as needed per script for muscle spasm  Continue CBD cream  Voltaren gel 1% is also OK to use over-the-counter on your left foot and the right shoulder  START Norco 5/325mg may take 1 tab every 6 hours max of 2/day #30 per month, fill/begin 6/24 to last 30 days   Further procedures recommended: none  Recommendations to PCP: see above  UDT today. No pain medication. precribed lorazepam. Drinks a fair amount of kombucha daily  Signed controlled substance agreement with me today.   Follow up: 10-12 weeks, can be virtual or in-person.   Please call [phone] to make your follow-up appointment with me.     ----------------------------------------------------------------  Clinic Number:  [phone]   Call with any questions about your care and for scheduling assistance.   Calls are returned Monday through Friday between 8 AM and 4:30 PM. We usually get back to you within 2 business days depending on the issue/request.    If we are prescribing your medications:  For opioid medication refills, call the clinic or send a Railpod message 7 days in advance.  Please include:  Name of requested medication  Name of the pharmacy.  For non-opioid medications, call your pharmacy directly to request a refill. Please allow 3-4 days to be processed.   Per [place] State Law:  All controlled substance prescriptions must be filled within 30 days of being written.    For those controlled substances allowing refills, pickup must occur within 30 days of last fill.      We believe regular attendance is key to your success in our program!    Any time you are unable to keep your appointment we ask that you call us at least 24 hours in advance to cancel.This will allow us to offer the appointment time to another patient.    Multiple missed appointments may lead to dismissal from the clinic.
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
## 2022-06-24 NOTE — NURSING NOTE
PEG Score 4/26/2022 6/24/2022   PEG Total Score 5.33 6.33        Tamika Huang MA  [facility] [place]

## 2022-06-24 NOTE — LETTER
Opioid / Opioid Plus Controlled Substance Agreement    This is an agreement between you and your provider about the safe and appropriate use of controlled substance/opioids prescribed by your care team. Controlled substances are medicines that can cause physical and mental dependence (abuse).    There are strict laws about having and using these medicines. We here at [facility] are committing to working with you in your efforts to get better. To support you in this work, we ll help you schedule regular office appointments for medicine refills. If we must cancel or change your appointment for any reason, we ll make sure you have enough medicine to last until your next appointment.     As a Provider, I will:    Listen carefully to your concerns and treat you with respect.     Recommend a treatment plan that I believe is in your best interest. This plan may involve therapies other than opioid pain medication.     Talk with you often about the possible benefits, and the risk of harm of any medicine that we prescribe for you.     Provide a plan on how to taper (discontinue or go off) using this medicine if the decision is made to stop its use.    As a Patient, I understand that opioid(s):     Are a controlled substance prescribed by my care team to help me function or work and manage my condition(s).     Are strong medicines and can cause serious side effects such as:    Drowsiness, which can seriously affect my driving ability    A lower breathing rate, enough to cause death    Harm to my thinking ability     Depression     Abuse of and addiction to this medicine    Need to be taken exactly as prescribed. Combining opioids with certain medicines or chemicals (such as illegal drugs, sedatives, sleeping pills, and benzodiazepines) can be dangerous or even fatal. If I stop opioids suddenly, I may have severe withdrawal symptoms.    Do not work for all types of pain nor for all patients. If they re not helpful, I may  be asked to stop them.        The risks, benefits and side effects of these medicine(s) were explained to me. I agree that:  1. I will take part in other treatments as advised by my care team. This may be psychiatry or counseling, physical therapy, behavioral therapy, group treatment or a referral to a specialist.     2. I will keep all my appointments. I understand that this is part of the monitoring of opioids. My care team may require an office visit for EVERY opioid/controlled substance refill. If I miss appointments or don t follow instructions, my care team may stop my medicine.    3. I will take my medicines as prescribed. I will not change the dose or schedule unless my care team tells me to. There will be no refills if I run out early.     4. I may be asked to come to the clinic and complete a urine drug test or complete a pill count at any time. If I don t give a urine sample or participate in a pill count, the care team may stop my medicine.    5. I will only receive prescriptions from this clinic for chronic pain. If I am treated by another provider for acute pain issues, I will tell them that I am taking opioid pain medication for chronic pain and that I have a treatment agreement with this provider. I will inform my [facility] care team within one business day if I am given a prescription for any pain medication by another healthcare provider. My [facility] care team can contact other providers and pharmacists about my use of any medicines.    6. It is up to me to make sure that I don t run out of my medicines on weekends or holidays. If my care team is willing to refill my opioid prescription without a visit, I must request refills only during office hours. Refills may take up to 3 business days to process. I will use one pharmacy to fill all my opioid and other controlled substance prescriptions. I will notify the clinic about any changes to my insurance or medication  availability.    7. I am responsible for my prescriptions. If the medicine/prescription is lost, stolen or destroyed, it will not be replaced. I also agree not to share controlled substance medicines with anyone.    8. I am aware I should not use any illegal or recreational drugs. I agree not to drink alcohol unless my care team says I can.       9. If I enroll in the [place] Medical Cannabis program, I will tell my care team prior to my next refill.     10. I will tell my care team right away if I become pregnant, have a new medical problem treated outside of my regular clinic, or have a change in my medications.    11. I understand that this medicine can affect my thinking, judgment and reaction time. Alcohol and drugs affect the brain and body, which can affect the safety of my driving. Being under the influence of alcohol or drugs can affect my decision-making, behaviors, personal safety, and the safety of others. Driving while impaired (DWI) can occur if a person is driving, operating, or in physical control of a car, motorcycle, boat, snowmobile, ATV, motorbike, off-road vehicle, or any other motor vehicle ([place] Statute 169A.20). I understand the risk if I choose to drive or operate any vehicle or machinery.    I understand that if I do not follow any of the conditions above, my prescriptions or treatment may be stopped or changed.          Opioids  What You Need to Know    What are opioids?   Opioids are pain medicines that must be prescribed by a doctor. They are also known as narcotics.     Examples are:   1. morphine (MS Contin, Missy)  2. oxycodone (Oxycontin)  3. oxycodone and acetaminophen (Percocet)  4. hydrocodone and acetaminophen (Vicodin, Norco)   5. fentanyl patch (Duragesic)   6. hydromorphone (Dilaudid)   7. methadone  8. codeine (Tylenol #3)     What do opioids do well?   Opioids are best for severe short-term pain such as after a surgery or injury. They may work well for cancer pain. They may  help some people with long-lasting (chronic) pain.     What do opioids NOT do well?   Opioids never get rid of pain entirely, and they don t work well for most patients with chronic pain. Opioids don t reduce swelling, one of the causes of pain.                                    Other ways to manage chronic pain and improve function include:       Treat the health problem that may be causing pain    Anti-inflammation medicines, which reduce swelling and tenderness, such as ibuprofen (Advil, Motrin) or naproxen (Aleve)    Acetaminophen (Tylenol)    Antidepressants and anti-seizure medicines, especially for nerve pain    Topical treatments such as patches or creams    Injections or nerve blocks    Chiropractic or osteopathic treatment    Acupuncture, massage, deep breathing, meditation, visual imagery, aromatherapy    Use heat or ice at the pain site    Physical therapy     Exercise    Stop smoking    Take part in therapy       Risks and side effects     Talk to your doctor before you start or decide to keep taking opioids. Possible side effects include:      Lowering your breathing rate enough to cause death    Overdose, including death, especially if taking higher than prescribed doses    Worse depression symptoms; less pleasure in things you usually enjoy    Feeling tired or sluggish    Slower thoughts or cloudy thinking    Being more sensitive to pain over time; pain is harder to control    Trouble sleeping or restless sleep    Changes in hormone levels (for example, less testosterone)    Changes in sex drive or ability to have sex    Constipation    Unsafe driving    Itching and sweating    Dizziness    Nausea, throwing up and dry mouth    What else should I know about opioids?    Opioids may lead to dependence, tolerance, or addiction.      Dependence means that if you stop or reduce the medicine too quickly, you will have withdrawal symptoms. These include loose poop (diarrhea), jitters, flu-like symptoms,  nervousness and tremors. Dependence is not the same as addiction.                       Tolerance means needing higher doses over time to get the same effect. This may increase the chance of serious side effects.      Addiction is when people improperly use a substance that harms their body, their mind or their relations with others. Use of opiates can cause a relapse of addiction if you have a history of drug or alcohol abuse.      People who have used opioids for a long time may have a lower quality of life, worse depression, higher levels of pain and more visits to doctors.    You can overdose on opioids. Take these steps to lower your risk of overdose:    1. Recognize the signs:  Signs of overdose include decrease or loss of consciousness (blackout), slowed breathing, trouble waking up and blue lips. If someone is worried about overdose, they should call 911.    2. Talk to your doctor about Narcan (naloxone).   If you are at risk for overdose, you may be given a prescription for Narcan. This medicine very quickly reverses the effects of opioids.   If you overdose, a friend or family member can give you Narcan while waiting for the ambulance. They need to know the signs of overdose and how to give Narcan.     3. Don't use alcohol or street drugs.   Taking them with opioids can cause death.    4. Do not take any of these medicines unless your doctor says it s OK. Taking these with opioids can cause death:    Benzodiazepines, such as lorazepam (Ativan), alprazolam (Xanax) or diazepam (Valium)    Muscle relaxers, such as cyclobenzaprine (Flexeril)    Sleeping pills like zolpidem (Ambien)     Other opioids      How to keep you and other people safe while taking opioids:    1. Never share your opioids with others.  Opioid medicines are regulated by the Drug Enforcement Agency (SANDRA). Selling or sharing medications is a criminal act.    2. Be sure to store opioids in a secure place, locked up if possible. Young children  can easily swallow them and overdose.    3. When you are traveling with your medicines, keep them in the original bottles. If you use a pill box, be sure you also carry a copy of your medicine list from your clinic or pharmacy.    4. Safe disposal of opioids    Most pharmacies have places to get rid of medicine, called disposal kiosks. Medicine disposal options are also available in every [facility]. Search your county and  medication disposal  to find more options. You can find more details at:  [email or web address]-[email or web address]     I agree that my provider, clinic care team, and pharmacy may work with any city, state or federal law enforcement agency that investigates the misuse, sale, or other diversion of my controlled medicine. I will allow my provider to discuss my care with, or share a copy of, this agreement with any other treating provider, pharmacy or emergency room where I receive care.    I have read this agreement and have asked questions about anything I did not understand.    _______________________________________________________  Patient Signature - Mata Poe _____________________                   Date     _______________________________________________________  Provider Signature - DIMITRI Corona CNP   _____________________                   Date     _______________________________________________________  Witness Signature (required if provider not present while patient signing)   _____________________                   Date

## 2022-06-25 LAB — ETHYL GLUCURONIDE UR QL SCN: POSITIVE NG/ML

## 2022-06-28 LAB
ETHYL GLUCURONIDE UR CFM-MCNC: 662 NG/ML
ETHYL SULFATE UR CFM-MCNC: 177 NG/ML
LORAZEPAM UR QL CFM: PRESENT

## 2022-07-26 ENCOUNTER — MYC REFILL (OUTPATIENT)
Dept: PALLIATIVE MEDICINE | Facility: CLINIC | Age: 62
End: 2022-07-26

## 2022-07-26 DIAGNOSIS — G90.522 COMPLEX REGIONAL PAIN SYNDROME TYPE 1 OF LEFT LOWER EXTREMITY: ICD-10-CM

## 2022-07-26 DIAGNOSIS — M47.816 SPONDYLOSIS OF LUMBAR REGION WITHOUT MYELOPATHY OR RADICULOPATHY: ICD-10-CM

## 2022-07-26 DIAGNOSIS — M79.672 LEFT FOOT PAIN: ICD-10-CM

## 2022-07-26 DIAGNOSIS — M54.50 CHRONIC BILATERAL LOW BACK PAIN WITHOUT SCIATICA: ICD-10-CM

## 2022-07-26 DIAGNOSIS — G89.29 CHRONIC BILATERAL LOW BACK PAIN WITHOUT SCIATICA: ICD-10-CM

## 2022-07-26 DIAGNOSIS — F11.90 CHRONIC, CONTINUOUS USE OF OPIOIDS: ICD-10-CM

## 2022-07-26 DIAGNOSIS — M54.59 LUMBAR FACET JOINT PAIN: ICD-10-CM

## 2022-07-26 NOTE — TELEPHONE ENCOUNTER
Refills have been requested for the following medications:         HYDROcodone-acetaminophen (NORCO) 5-325 MG tablet [Jenna N]       Patient Comment: Very  helpful for physical therapy     Preferred pharmacy: [facility] - [place], [place] - [address] [place] [address]

## 2022-07-27 DIAGNOSIS — M54.50 CHRONIC BILATERAL LOW BACK PAIN WITHOUT SCIATICA: ICD-10-CM

## 2022-07-27 DIAGNOSIS — G89.29 CHRONIC BILATERAL LOW BACK PAIN WITHOUT SCIATICA: ICD-10-CM

## 2022-07-27 DIAGNOSIS — G90.522 COMPLEX REGIONAL PAIN SYNDROME TYPE 1 OF LEFT LOWER EXTREMITY: ICD-10-CM

## 2022-07-27 DIAGNOSIS — M54.59 LUMBAR FACET JOINT PAIN: ICD-10-CM

## 2022-07-27 DIAGNOSIS — F11.90 CHRONIC, CONTINUOUS USE OF OPIOIDS: ICD-10-CM

## 2022-07-27 DIAGNOSIS — M47.816 SPONDYLOSIS OF LUMBAR REGION WITHOUT MYELOPATHY OR RADICULOPATHY: ICD-10-CM

## 2022-07-27 DIAGNOSIS — M79.672 LEFT FOOT PAIN: ICD-10-CM

## 2022-07-27 RX ORDER — HYDROCODONE BITARTRATE AND ACETAMINOPHEN 5; 325 MG/1; MG/1
1 TABLET ORAL EVERY 6 HOURS PRN
Qty: 30 TABLET | Refills: 0 | Status: CANCELLED | OUTPATIENT
Start: 2022-07-27

## 2022-07-27 RX ORDER — HYDROCODONE BITARTRATE AND ACETAMINOPHEN 5; 325 MG/1; MG/1
1 TABLET ORAL EVERY 6 HOURS PRN
Qty: 30 TABLET | Refills: 0 | Status: SHIPPED | OUTPATIENT
Start: 2022-07-27 | End: 2022-08-29

## 2022-07-27 NOTE — TELEPHONE ENCOUNTER
Received fax from pharmacy requesting refill(s) for     HYDROcodone-acetaminophen (NORCO) 5-325 MG tablet    Date last filled 06.24.2022    UDT/CSA  06.24.2022    Pharmacy:      [facility] - [place], [place] - [address] [place] [address] Visit Facilitator

## 2022-07-27 NOTE — TELEPHONE ENCOUNTER
Patient Comment: Very  helpful for physical therapy     Patient requesting refill(s) of HYDROcodone-acetaminophen (NORCO) 5-325 MG tablet   Last dispensed from pharmacy on 6/24/22    Patient's last office/virtual visit by prescribing provider on 6/24/22  Next office/virtual appointment scheduled for None     Last urine drug screen date 6/24/22  Current opioid agreement on file (completed within the last year) Yes Date of opioid agreement: 6/24/22    E-prescribe to  [facility] - [place], [place] - [address]      Will route to nursing [place] for review and preparation of prescription(s).

## 2022-07-27 NOTE — TELEPHONE ENCOUNTER
. Please schedule a follow up with Jenna Parrish for end of September. Virtual or in clinic.     Medication refill information reviewed.     Due date for HYDROcodone-acetaminophen (NORCO) 5-325 MG tablet is 07/27/22     Prescriptions prepped for review.     Will route to provider.

## 2022-07-27 NOTE — TELEPHONE ENCOUNTER
[facility] Call Center    Phone Message    May a detailed message be left on voicemail: yes     Reason for Call: Other: Patient called and set up his appointment. Please send refills to pharmacy.      Action Taken: Other: Pain    Travel Screening: Not Applicable

## 2022-07-27 NOTE — TELEPHONE ENCOUNTER
Received call from patient requesting refill(s) of HYDROcodone-acetaminophen (NORCO) 5-325 MG tablet     Last dispensed from pharmacy on 06.24.2022       Patient's last office/virtual visit by prescribing provider on 06/24/2022  Next office/virtual appointment scheduled for 09/27/2022    Last urine drug screen date 06/24/2022  Current opioid agreement on file (completed within the last year) Yes Date of opioid agreement: 06/24/2022    E-prescribe to   [facility] - [place], [place] - [address] [place] [address],    pharmacy    Will route to nursing [place] for review and preparation of prescription(s).     Tamika Huang MA  [facility] [place]

## 2022-07-28 NOTE — TELEPHONE ENCOUNTER
Signed Prescriptions:                        Disp   Refills    HYDROcodone-acetaminophen (NORCO) 5-325 MG*30 tab*0        Sig: Take 1 tablet by mouth every 6 hours as needed for           severe pain Max of 2 tabs per day. Use sparingly.           Fill/begin 07/27/22 to last 30 days  Authorizing Provider: JENNA MCDERMOTT    Reviewed [facility] July 27, 2022- no concerning fills.    Jenna MOSLEY, RN CNP, FNP  [facility]  [facility]

## 2022-08-29 ENCOUNTER — MYC REFILL (OUTPATIENT)
Dept: PALLIATIVE MEDICINE | Facility: CLINIC | Age: 62
End: 2022-08-29

## 2022-08-29 DIAGNOSIS — M54.59 LUMBAR FACET JOINT PAIN: ICD-10-CM

## 2022-08-29 DIAGNOSIS — M79.672 LEFT FOOT PAIN: ICD-10-CM

## 2022-08-29 DIAGNOSIS — G89.29 CHRONIC BILATERAL LOW BACK PAIN WITHOUT SCIATICA: ICD-10-CM

## 2022-08-29 DIAGNOSIS — F11.90 CHRONIC, CONTINUOUS USE OF OPIOIDS: ICD-10-CM

## 2022-08-29 DIAGNOSIS — M47.816 SPONDYLOSIS OF LUMBAR REGION WITHOUT MYELOPATHY OR RADICULOPATHY: ICD-10-CM

## 2022-08-29 DIAGNOSIS — M54.50 CHRONIC BILATERAL LOW BACK PAIN WITHOUT SCIATICA: ICD-10-CM

## 2022-08-29 DIAGNOSIS — G90.522 COMPLEX REGIONAL PAIN SYNDROME TYPE 1 OF LEFT LOWER EXTREMITY: ICD-10-CM

## 2022-08-29 NOTE — TELEPHONE ENCOUNTER
Received call from patient requesting refill(s) of HYDROcodone-acetaminophen (NORCO) 5-325 MG tablet     Last dispensed from pharmacy on 07/27/2022    Patient's last office/virtual visit by prescribing provider on 06/24/2022  Next office/virtual appointment scheduled for 09/24/2022    Last urine drug screen date 06/24/2022  Current opioid agreement on file (completed within the last year) Yes Date of opioid agreement: 06/24/2022    E-prescribe to [facility] - [place], [place] - [address] [place] [address] pharmacy    Will route to [facility] for review and preparation of prescription(s).     Tamika Huang MA  [facility] [place]

## 2022-08-29 NOTE — TELEPHONE ENCOUNTER
Refills have been requested for the following medications:         HYDROcodone-acetaminophen (NORCO) 5-325 MG tablet [Jenna N]     Preferred pharmacy: [facility] - [place], [place] - [address] [place] [address]

## 2022-08-30 ENCOUNTER — TELEPHONE (OUTPATIENT)
Dept: PALLIATIVE MEDICINE | Facility: CLINIC | Age: 62
End: 2022-08-30

## 2022-08-30 RX ORDER — HYDROCODONE BITARTRATE AND ACETAMINOPHEN 5; 325 MG/1; MG/1
1 TABLET ORAL EVERY 6 HOURS PRN
Qty: 30 TABLET | Refills: 0 | Status: SHIPPED | OUTPATIENT
Start: 2022-08-30 | End: 2022-09-27

## 2022-08-30 NOTE — TELEPHONE ENCOUNTER
[facility] Call Center    Phone Message    May a detailed message be left on voicemail: yes     Reason for Call: Medication Question or concern regarding medication   Prescription Clarification  Name of Medication: HYDROcodone-acetaminophen (NORCO) 5-325 MG tablet  Prescribing Provider: Jenna Parrish   Pharmacy: [facility]   What on the order needs clarification? Wrote it for 1 tab by mouth every 6 hours, 2 tabs max, doesn't equal 30 day supply.  Please call Pharmacy back to clarify.

## 2022-08-30 NOTE — TELEPHONE ENCOUNTER
This is a 30 day supply. Pharmacy notified.    SUSY RachelN, RN  Care Coordinator  [facility] [place]

## 2022-08-30 NOTE — TELEPHONE ENCOUNTER
Medication refill information reviewed.     Due date for HYDROcodone-acetaminophen (NORCO) 5-325 MG tablet is 08/30/22      Prescriptions prepped for review.     Will route to provider.

## 2022-09-21 ASSESSMENT — ANXIETY QUESTIONNAIRES
4. TROUBLE RELAXING: NOT AT ALL
7. FEELING AFRAID AS IF SOMETHING AWFUL MIGHT HAPPEN: NOT AT ALL
GAD7 TOTAL SCORE: 0
3. WORRYING TOO MUCH ABOUT DIFFERENT THINGS: NOT AT ALL
5. BEING SO RESTLESS THAT IT IS HARD TO SIT STILL: NOT AT ALL
IF YOU CHECKED OFF ANY PROBLEMS ON THIS QUESTIONNAIRE, HOW DIFFICULT HAVE THESE PROBLEMS MADE IT FOR YOU TO DO YOUR WORK, TAKE CARE OF THINGS AT HOME, OR GET ALONG WITH OTHER PEOPLE: SOMEWHAT DIFFICULT
GAD7 TOTAL SCORE: 0
2. NOT BEING ABLE TO STOP OR CONTROL WORRYING: NOT AT ALL
1. FEELING NERVOUS, ANXIOUS, OR ON EDGE: NOT AT ALL
8. IF YOU CHECKED OFF ANY PROBLEMS, HOW DIFFICULT HAVE THESE MADE IT FOR YOU TO DO YOUR WORK, TAKE CARE OF THINGS AT HOME, OR GET ALONG WITH OTHER PEOPLE?: SOMEWHAT DIFFICULT
7. FEELING AFRAID AS IF SOMETHING AWFUL MIGHT HAPPEN: NOT AT ALL
6. BECOMING EASILY ANNOYED OR IRRITABLE: NOT AT ALL

## 2022-09-21 ASSESSMENT — PAIN SCALES - PAIN ENJOYMENT GENERAL ACTIVITY SCALE (PEG)
PEG_TOTALSCORE: 5.67
PEG_TOTALSCORE: 5.67
INTERFERED_ENJOYMENT_LIFE: 4
AVG_PAIN_PASTWEEK: 8
AVG_PAIN_PASTWEEK: 8
INTERFERED_GENERAL_ACTIVITY: 5
INTERFERED_GENERAL_ACTIVITY: 5
INTERFERED_ENJOYMENT_LIFE: 4

## 2022-09-21 ASSESSMENT — ENCOUNTER SYMPTOMS
BACK PAIN: 1
MYALGIAS: 1
MUSCLE WEAKNESS: 0
NECK PAIN: 0
STIFFNESS: 1
JOINT SWELLING: 0
MUSCLE CRAMPS: 1
ARTHRALGIAS: 0

## 2022-09-27 ENCOUNTER — OFFICE VISIT (OUTPATIENT)
Dept: PALLIATIVE MEDICINE | Facility: CLINIC | Age: 62
End: 2022-09-27
Payer: COMMERCIAL

## 2022-09-27 VITALS — DIASTOLIC BLOOD PRESSURE: 84 MMHG | HEART RATE: 59 BPM | SYSTOLIC BLOOD PRESSURE: 137 MMHG

## 2022-09-27 DIAGNOSIS — M54.59 LUMBAR FACET JOINT PAIN: ICD-10-CM

## 2022-09-27 DIAGNOSIS — M47.816 SPONDYLOSIS OF LUMBAR REGION WITHOUT MYELOPATHY OR RADICULOPATHY: ICD-10-CM

## 2022-09-27 DIAGNOSIS — G89.29 CHRONIC BILATERAL LOW BACK PAIN WITHOUT SCIATICA: ICD-10-CM

## 2022-09-27 DIAGNOSIS — M79.672 LEFT FOOT PAIN: ICD-10-CM

## 2022-09-27 DIAGNOSIS — M79.18 LUMBAR MUSCLE PAIN: ICD-10-CM

## 2022-09-27 DIAGNOSIS — G90.522 COMPLEX REGIONAL PAIN SYNDROME TYPE 1 OF LEFT LOWER EXTREMITY: ICD-10-CM

## 2022-09-27 DIAGNOSIS — F11.90 CHRONIC, CONTINUOUS USE OF OPIOIDS: ICD-10-CM

## 2022-09-27 DIAGNOSIS — M54.50 CHRONIC BILATERAL LOW BACK PAIN WITHOUT SCIATICA: ICD-10-CM

## 2022-09-27 PROCEDURE — 99213 OFFICE O/P EST LOW 20 MIN: CPT | Performed by: NURSE PRACTITIONER

## 2022-09-27 RX ORDER — METHOCARBAMOL 750 MG/1
750-1500 TABLET, FILM COATED ORAL 4 TIMES DAILY PRN
Qty: 120 TABLET | Refills: 1 | Status: SHIPPED | OUTPATIENT
Start: 2022-09-27 | End: 2022-10-25

## 2022-09-27 RX ORDER — HYDROCODONE BITARTRATE AND ACETAMINOPHEN 5; 325 MG/1; MG/1
1 TABLET ORAL EVERY 6 HOURS PRN
Qty: 30 TABLET | Refills: 0 | Status: SHIPPED | OUTPATIENT
Start: 2022-09-27 | End: 2022-10-13

## 2022-09-27 ASSESSMENT — PAIN SCALES - GENERAL: PAINLEVEL: MODERATE PAIN (5)
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
## 2022-09-27 NOTE — PATIENT INSTRUCTIONS
Plan:   Physical Therapy:  None at present time  Keep up your walking program  Clinical Health Psychologist: Zac is working with an outside therapist with EMDR, I am in support of this  Diagnostic Studies:  None  Medication Management:    Methocarbamol as needed per script for muscle spasm  Continue CBD cream  Voltaren gel 1% is also OK to use over-the-counter on your left foot and the right shoulder  Norco 5/325mg may take 1 tab every 6 hours max of 2/day #30 per month, fill 9/27 begin 9/29 to last 30 days   Further procedures recommended: none  Recommendations to PCP: see above   Follow up: 10-12 weeks, can be virtual or in-person.   Please call [phone] to make your follow-up appointment with me.   I have reached out to your primary care provider to see if they would assume the opiate medication, especially while you are in [place] as Nurse practitioners are not allowed to write for any controlled substances in that state.     ----------------------------------------------------------------  Clinic Number:  [phone]   Call with any questions about your care and for scheduling assistance.   Calls are returned Monday through Friday between 8 AM and 4:30 PM. We usually get back to you within 2 business days depending on the issue/request.    If we are prescribing your medications:  For opioid medication refills, call the clinic or send a [employer] message 7 days in advance.  Please include:  Name of requested medication  Name of the pharmacy.  For non-opioid medications, call your pharmacy directly to request a refill. Please allow 3-4 days to be processed.   Per [place] State Law:  All controlled substance prescriptions must be filled within 30 days of being written.    For those controlled substances allowing refills, pickup must occur within 30 days of last fill.      We believe regular attendance is key to your success in our program!    Any time you are unable to keep your appointment we ask that you call us at  least 24 hours in advance to cancel.This will allow us to offer the appointment time to another patient.   Multiple missed appointments may lead to dismissal from the clinic.

## 2022-09-28 ENCOUNTER — TELEPHONE (OUTPATIENT)
Dept: FAMILY MEDICINE | Facility: CLINIC | Age: 62
End: 2022-09-28

## 2022-09-28 NOTE — TELEPHONE ENCOUNTER
----- Message from DIMITRI Corona CNP sent at 9/27/2022 10:55 AM CDT -----  Regarding: would you consider taking over his opiate med?  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 Also uses tizanidine for muscle spasms. He is going to [place] for the winter. I am wondering since he on such a low dose of opiate, would you consider managing this while he is gone as NPs are not able to sign controlled substance in [place].     Let me know either way.     Thanks.   Jenna MOSLEY, RN CNP, FNP  [facility]  [facility]

## 2022-10-11 ENCOUNTER — MYC MEDICAL ADVICE (OUTPATIENT)
Dept: FAMILY MEDICINE | Facility: CLINIC | Age: 62
End: 2022-10-11

## 2022-10-11 DIAGNOSIS — M54.50 CHRONIC BILATERAL LOW BACK PAIN WITHOUT SCIATICA: ICD-10-CM

## 2022-10-11 DIAGNOSIS — G89.29 CHRONIC BILATERAL LOW BACK PAIN WITHOUT SCIATICA: ICD-10-CM

## 2022-10-11 DIAGNOSIS — M54.59 LUMBAR FACET JOINT PAIN: ICD-10-CM

## 2022-10-11 DIAGNOSIS — F11.90 CHRONIC, CONTINUOUS USE OF OPIOIDS: ICD-10-CM

## 2022-10-11 DIAGNOSIS — M47.816 SPONDYLOSIS OF LUMBAR REGION WITHOUT MYELOPATHY OR RADICULOPATHY: ICD-10-CM

## 2022-10-11 DIAGNOSIS — M79.672 LEFT FOOT PAIN: ICD-10-CM

## 2022-10-11 DIAGNOSIS — G90.522 COMPLEX REGIONAL PAIN SYNDROME TYPE 1 OF LEFT LOWER EXTREMITY: ICD-10-CM

## 2022-10-11 NOTE — TELEPHONE ENCOUNTER
Patient sent [employer] informing Dr Sears that he will be using [facility] for some medications fills.    Roured to Dr Sears for review..    Pal DOWNS [facility]

## 2022-10-12 DIAGNOSIS — E78.5 HYPERLIPIDEMIA LDL GOAL <130: Chronic | ICD-10-CM

## 2022-10-12 DIAGNOSIS — G47.9 DISTURBANCE IN SLEEP BEHAVIOR: ICD-10-CM

## 2022-10-12 RX ORDER — ATORVASTATIN CALCIUM 40 MG/1
40 TABLET, FILM COATED ORAL DAILY
Qty: 90 TABLET | Refills: 1 | Status: SHIPPED | OUTPATIENT
Start: 2022-10-12 | End: 2023-06-21

## 2022-10-12 RX ORDER — TRAZODONE HYDROCHLORIDE 50 MG/1
50-100 TABLET, FILM COATED ORAL
Qty: 180 TABLET | Refills: 1 | Status: SHIPPED | OUTPATIENT
Start: 2022-10-12 | End: 2023-05-05

## 2022-10-13 NOTE — TELEPHONE ENCOUNTER
I had agreed to take over hydrocodone prescribing while he is in [place] as his nurse practitioner that he sees in the pain clinic cannot prescribe out-of-state.  Which other medications does he need transferred?  I have not physically seen him since 2019 so we will upon his return from [place] he will need a face-to-face office visit for any further refills.  I will however cover him until he is back from [place].  Where does he need the hydrocodone faxed?  Where does he want the other prescriptions faxed.  I see he has most through [facility] but 1 through [facility] but it does not indicate which ones.

## 2022-10-14 NOTE — TELEPHONE ENCOUNTER
Routing refill request to provider for review/approval because:  Drug not on the [facility] refill protocol     Pending Prescriptions:                       Disp   Refills    HYDROcodone-acetaminophen (NORCO) 5-325 MG*30 tab*0        Sig: Take 1 tablet by mouth every 6 hours as needed for           severe pain Max of 2 tabs per day. Use sparingly.           Fill 10/26/2022 and start 10/28/22 to last 30           days    Requested Prescriptions   Pending Prescriptions Disp Refills    HYDROcodone-acetaminophen (NORCO) 5-325 MG tablet 30 tablet 0     Sig: Take 1 tablet by mouth every 6 hours as needed for severe pain Max of 2 tabs per day. Use sparingly.  Fill 10/26/2022 and start 10/28/22 to last 30 days       There is no refill protocol information for this order        Cat Kilpatrick RN on 10/14/2022 at 1:53 PM

## 2022-10-19 ENCOUNTER — TELEPHONE (OUTPATIENT)
Dept: FAMILY MEDICINE | Facility: CLINIC | Age: 62
End: 2022-10-19

## 2022-10-19 ENCOUNTER — MYC REFILL (OUTPATIENT)
Dept: FAMILY MEDICINE | Facility: CLINIC | Age: 62
End: 2022-10-19

## 2022-10-19 DIAGNOSIS — F11.90 CHRONIC, CONTINUOUS USE OF OPIOIDS: ICD-10-CM

## 2022-10-19 DIAGNOSIS — F41.8 MIXED ANXIETY DEPRESSIVE DISORDER: Chronic | ICD-10-CM

## 2022-10-19 DIAGNOSIS — M79.672 LEFT FOOT PAIN: ICD-10-CM

## 2022-10-19 DIAGNOSIS — M54.50 CHRONIC BILATERAL LOW BACK PAIN WITHOUT SCIATICA: ICD-10-CM

## 2022-10-19 DIAGNOSIS — M54.59 LUMBAR FACET JOINT PAIN: ICD-10-CM

## 2022-10-19 DIAGNOSIS — M47.816 SPONDYLOSIS OF LUMBAR REGION WITHOUT MYELOPATHY OR RADICULOPATHY: ICD-10-CM

## 2022-10-19 DIAGNOSIS — G89.29 CHRONIC BILATERAL LOW BACK PAIN WITHOUT SCIATICA: ICD-10-CM

## 2022-10-19 DIAGNOSIS — G90.522 COMPLEX REGIONAL PAIN SYNDROME TYPE 1 OF LEFT LOWER EXTREMITY: ICD-10-CM

## 2022-10-19 RX ORDER — HYDROCODONE BITARTRATE AND ACETAMINOPHEN 5; 325 MG/1; MG/1
1 TABLET ORAL EVERY 6 HOURS PRN
Qty: 30 TABLET | Refills: 0 | Status: SHIPPED | OUTPATIENT
Start: 2022-10-19 | End: 2022-10-21

## 2022-10-19 RX ORDER — PAROXETINE 10 MG/1
10 TABLET, FILM COATED ORAL EVERY MORNING
Qty: 90 TABLET | Refills: 4 | Status: CANCELLED | OUTPATIENT
Start: 2022-10-19

## 2022-10-19 NOTE — TELEPHONE ENCOUNTER
Writer called patient and instructed him to call [facility] and have them request the order fort the pended paroxetine and also for alfuzosin ER from [facility] as they should have remaining refills on file.    Patient stated understanding and said that he would do that.    Pal DOWNS [facility]

## 2022-10-19 NOTE — TELEPHONE ENCOUNTER
Reason for Call:  Medication refill:    Do you use a [facility]?  Name of the pharmacy and phone number for the current request:  [facility], [place], [place], [address]  Name of the medication requested: Paroxetine    Other request: New pharmacy for this med    Can we leave a detailed message on this number? YES    Phone number patient can be reached at: Home number on file [phone] (home)    Best Time: Any    Call taken on 10/19/2022 at 10:30 AM by Aminah Luna

## 2022-10-20 RX ORDER — HYDROCODONE BITARTRATE AND ACETAMINOPHEN 5; 325 MG/1; MG/1
1 TABLET ORAL EVERY 6 HOURS PRN
Qty: 30 TABLET | Refills: 0 | Status: CANCELLED | OUTPATIENT
Start: 2022-10-20

## 2022-10-20 NOTE — TELEPHONE ENCOUNTER
Dr Sears - [facility] pharmacy refused the request stating tCode: 900 - Transaction rejected   Pharmacy does not dispense class I or class II controlled substances               Med pended for [facility] per original pt request

## 2022-10-21 RX ORDER — HYDROCODONE BITARTRATE AND ACETAMINOPHEN 5; 325 MG/1; MG/1
1 TABLET ORAL EVERY 6 HOURS PRN
Qty: 30 TABLET | Refills: 0 | Status: SHIPPED | OUTPATIENT
Start: 2022-10-21 | End: 2022-11-21

## 2022-10-21 NOTE — TELEPHONE ENCOUNTER
Looks like there are multiple messages regarding this. Looks like [facility] is where he wants it sent. Prescription faxed to pharmacy, please notify patient.     4

## 2022-10-21 NOTE — TELEPHONE ENCOUNTER
Writer informed patient via [employer] message.    Pal DOWNS [facility]

## 2022-10-25 DIAGNOSIS — M79.18 LUMBAR MUSCLE PAIN: ICD-10-CM

## 2022-10-25 RX ORDER — METHOCARBAMOL 750 MG/1
750-1500 TABLET, FILM COATED ORAL 4 TIMES DAILY PRN
Qty: 120 TABLET | Refills: 1 | Status: SHIPPED | OUTPATIENT
Start: 2022-10-25 | End: 2023-05-09

## 2022-10-25 NOTE — TELEPHONE ENCOUNTER
Received fax request from [facility] requesting refill(s) for methocarbamol (ROBAXIN) 750 MG tablet    Last refilled on 10/12/22    Pt last seen on 09/27/22  Next appt scheduled for : none    Will facilitate refill.

## 2022-10-25 NOTE — TELEPHONE ENCOUNTER
Signed Prescriptions:                        Disp   Refills    methocarbamol (ROBAXIN) 750 MG tablet      120 ta*1        Sig: Take 1-2 tablets (750-1,500 mg) by mouth 4 times           daily as needed for muscle spasms For muscle           spasms.  Authorizing Provider: MARYCRUZ MCDERMOTT, RN CNP, FNP  [facility]  [facility]

## 2022-10-26 ENCOUNTER — TRANSFERRED RECORDS (OUTPATIENT)
Dept: HEALTH INFORMATION MANAGEMENT | Facility: CLINIC | Age: 62
End: 2022-10-26

## 2022-10-29 ENCOUNTER — TELEPHONE (OUTPATIENT)
Dept: FAMILY MEDICINE | Facility: CLINIC | Age: 62
End: 2022-10-29

## 2022-10-29 DIAGNOSIS — F41.8 MIXED ANXIETY DEPRESSIVE DISORDER: Chronic | ICD-10-CM

## 2022-11-02 RX ORDER — LORAZEPAM 1 MG/1
TABLET ORAL
Qty: 15 TABLET | Refills: 0 | Status: SHIPPED | OUTPATIENT
Start: 2022-11-02 | End: 2023-01-23

## 2022-11-02 NOTE — TELEPHONE ENCOUNTER
Prescription faxed to pharmacy.  Do not take lorazepam at the same time as Norco as it can lead to excessive respiratory suppression/sudden death.

## 2022-11-03 ENCOUNTER — TELEPHONE (OUTPATIENT)
Dept: FAMILY MEDICINE | Facility: CLINIC | Age: 62
End: 2022-11-03

## 2022-11-03 NOTE — TELEPHONE ENCOUNTER
Dr Sears,     Per note from [facility]    Per the FDA's recent safety communication prescribers should limit opiod pain medicines with benzodiazepines or other CNS depressants only to pts for whom alternative treatment options are inadequate, this patient is currently on hydrocodone and lorazepam, would you like to continue patient on this combination?    Please see fax in your inbox, thank you

## 2022-11-03 NOTE — TELEPHONE ENCOUNTER
Writer called patient and left message requesting that he call back to the clinic.    First attempt.    Pal DOWNS [facility]

## 2022-11-19 ENCOUNTER — HEALTH MAINTENANCE LETTER (OUTPATIENT)
Age: 62
End: 2022-11-19

## 2022-11-21 ENCOUNTER — MYC REFILL (OUTPATIENT)
Dept: FAMILY MEDICINE | Facility: CLINIC | Age: 62
End: 2022-11-21

## 2022-11-21 DIAGNOSIS — G89.29 CHRONIC BILATERAL LOW BACK PAIN WITHOUT SCIATICA: ICD-10-CM

## 2022-11-21 DIAGNOSIS — M79.672 LEFT FOOT PAIN: ICD-10-CM

## 2022-11-21 DIAGNOSIS — G90.522 COMPLEX REGIONAL PAIN SYNDROME TYPE 1 OF LEFT LOWER EXTREMITY: ICD-10-CM

## 2022-11-21 DIAGNOSIS — M54.50 CHRONIC BILATERAL LOW BACK PAIN WITHOUT SCIATICA: ICD-10-CM

## 2022-11-21 DIAGNOSIS — M47.816 SPONDYLOSIS OF LUMBAR REGION WITHOUT MYELOPATHY OR RADICULOPATHY: ICD-10-CM

## 2022-11-21 DIAGNOSIS — M54.59 LUMBAR FACET JOINT PAIN: ICD-10-CM

## 2022-11-21 DIAGNOSIS — F11.90 CHRONIC, CONTINUOUS USE OF OPIOIDS: ICD-10-CM

## 2022-11-22 NOTE — TELEPHONE ENCOUNTER
Routing to ordering provider for consideration, not on refill protocol.           Sarah Robertson     RN MSN

## 2022-11-23 RX ORDER — HYDROCODONE BITARTRATE AND ACETAMINOPHEN 5; 325 MG/1; MG/1
1 TABLET ORAL EVERY 6 HOURS PRN
Qty: 30 TABLET | Refills: 0 | Status: SHIPPED | OUTPATIENT
Start: 2022-11-23 | End: 2022-12-01

## 2022-11-23 NOTE — TELEPHONE ENCOUNTER
PDMP Review       Value Time User    State PDMP site checked  Yes 11/23/2022  6:43 AM Parrish Sears MD        Prescription faxed to pharmacy.

## 2022-12-01 ENCOUNTER — VIRTUAL VISIT (OUTPATIENT)
Dept: FAMILY MEDICINE | Facility: CLINIC | Age: 62
End: 2022-12-01
Payer: COMMERCIAL

## 2022-12-01 DIAGNOSIS — G89.29 CHRONIC BILATERAL LOW BACK PAIN WITHOUT SCIATICA: ICD-10-CM

## 2022-12-01 DIAGNOSIS — G90.522 COMPLEX REGIONAL PAIN SYNDROME TYPE 1 OF LEFT LOWER EXTREMITY: ICD-10-CM

## 2022-12-01 DIAGNOSIS — M54.59 LUMBAR FACET JOINT PAIN: ICD-10-CM

## 2022-12-01 DIAGNOSIS — F11.90 CHRONIC, CONTINUOUS USE OF OPIOIDS: ICD-10-CM

## 2022-12-01 DIAGNOSIS — M79.672 LEFT FOOT PAIN: ICD-10-CM

## 2022-12-01 DIAGNOSIS — M54.50 CHRONIC BILATERAL LOW BACK PAIN WITHOUT SCIATICA: ICD-10-CM

## 2022-12-01 DIAGNOSIS — M47.816 SPONDYLOSIS OF LUMBAR REGION WITHOUT MYELOPATHY OR RADICULOPATHY: ICD-10-CM

## 2022-12-01 PROCEDURE — 99442 PR PHYSICIAN TELEPHONE EVALUATION 11-20 MIN: CPT | Performed by: FAMILY MEDICINE

## 2022-12-01 RX ORDER — HYDROCODONE BITARTRATE AND ACETAMINOPHEN 5; 325 MG/1; MG/1
1 TABLET ORAL EVERY 6 HOURS PRN
Qty: 30 TABLET | Refills: 0 | Status: SHIPPED | OUTPATIENT
Start: 2022-12-01 | End: 2022-12-08

## 2022-12-01 NOTE — PROGRESS NOTES
Zac is a 62 year old who is being evaluated via a billable telephone visit.      What phone number would you like to be contacted at? [phone]  How would you like to obtain your AVS? Essence    Assessment & Plan     Complex regional pain syndrome type 1 of left lower extremity  He generally sees the pain clinic for this.  However, his provider the pain clinic is a nurse practitioner and he is currently in [place].  By [place] laws nurse practitioners are not allowed to fax controlled substances from out of state.  Pain clinic asked if I would be willing to fill these while he is in [place].  I did agree.  There have been some issues in terms of him being able to get these.  Initially he tried mail-order and was having all sorts of issues in terms of processing the refill.  He does have a local pharmacy near him and I suggested that would be likely in easier way of getting the prescription while he is in [place].  Prescription faxed to his local pharmacy.  Follow-up in 3 months for med check if he still in [place].  - HYDROcodone-acetaminophen (NORCO) 5-325 MG tablet; Take 1 tablet by mouth every 6 hours as needed for severe pain (7-10) Max of 2 tabs per day. Use sparingly.      Left foot pain    - HYDROcodone-acetaminophen (NORCO) 5-325 MG tablet; Take 1 tablet by mouth every 6 hours as needed for severe pain (7-10) Max of 2 tabs per day. Use sparingly.      Chronic bilateral low back pain without sciatica    - HYDROcodone-acetaminophen (NORCO) 5-325 MG tablet; Take 1 tablet by mouth every 6 hours as needed for severe pain (7-10) Max of 2 tabs per day. Use sparingly.     Lumbar facet joint pain    - HYDROcodone-acetaminophen (NORCO) 5-325 MG tablet; Take 1 tablet by mouth every 6 hours as needed for severe pain (7-10) Max of 2 tabs per day. Use sparingly.  Fill 10/26/2022 and start 10/28/22 to last 30 days    Spondylosis of lumbar region without myelopathy or radiculopathy    - HYDROcodone-acetaminophen  (NORCO) 5-325 MG tablet; Take 1 tablet by mouth every 6 hours as needed for severe pain (7-10) Max of 2 tabs per day. Use sparingly.     Chronic, continuous use of opioids    - HYDROcodone-acetaminophen (NORCO) 5-325 MG tablet; Take 1 tablet by mouth every 6 hours as needed for severe pain (7-10) Max of 2 tabs per day. Use sparingly.                No follow-ups on file.    Parrish Sears MD  [facility]    Shady Frank is a 62 year old, presenting for the following health issues:      History of Present Illness       He eats 2-3 servings of fruits and vegetables daily.He consumes 0 sweetened beverage(s) daily.He exercises with enough effort to increase his heart rate 20 to 29 minutes per day.  He exercises with enough effort to increase his heart rate 3 or less days per week.   He is taking medications regularly.       Patient would like to discuss recent cholesterol results and just talk about his overall health.  He also has a question regarding  documentation his pharmacy needs in order to fill his prescription.      Review of Systems   Constitutional, neuro, ENT, endocrine, pulmonary, cardiac, gastrointestinal, genitourinary, musculoskeletal, integument and psychiatric systems are negative, except as otherwise noted.       Objective           Vitals:  No vitals were obtained today due to virtual visit.    Physical Exam   healthy, alert and no distress  PSYCH: Alert and oriented times 3; coherent speech, normal   rate and volume, able to articulate logical thoughts, able   to abstract reason, no tangential thoughts, no hallucinations   or delusions  His affect is normal  RESP: No cough, no audible wheezing, able to talk in full sentences  Remainder of exam unable to be completed due to telephone visits                Phone call duration: 15 minutes

## 2022-12-06 ENCOUNTER — TRANSFERRED RECORDS (OUTPATIENT)
Dept: HEALTH INFORMATION MANAGEMENT | Facility: CLINIC | Age: 62
End: 2022-12-06

## 2022-12-08 RX ORDER — HYDROCODONE BITARTRATE AND ACETAMINOPHEN 5; 325 MG/1; MG/1
1 TABLET ORAL EVERY 6 HOURS PRN
Qty: 30 TABLET | Refills: 0
Start: 2022-12-08 | End: 2023-01-03

## 2023-01-04 ENCOUNTER — TRANSFERRED RECORDS (OUTPATIENT)
Dept: HEALTH INFORMATION MANAGEMENT | Facility: CLINIC | Age: 63
End: 2023-01-04

## 2023-02-09 ENCOUNTER — MYC REFILL (OUTPATIENT)
Dept: FAMILY MEDICINE | Facility: CLINIC | Age: 63
End: 2023-02-09
Payer: COMMERCIAL

## 2023-02-09 DIAGNOSIS — M54.50 CHRONIC BILATERAL LOW BACK PAIN WITHOUT SCIATICA: ICD-10-CM

## 2023-02-09 DIAGNOSIS — M54.59 LUMBAR FACET JOINT PAIN: ICD-10-CM

## 2023-02-09 DIAGNOSIS — G90.522 COMPLEX REGIONAL PAIN SYNDROME TYPE 1 OF LEFT LOWER EXTREMITY: ICD-10-CM

## 2023-02-09 DIAGNOSIS — M47.816 SPONDYLOSIS OF LUMBAR REGION WITHOUT MYELOPATHY OR RADICULOPATHY: ICD-10-CM

## 2023-02-09 DIAGNOSIS — M79.672 LEFT FOOT PAIN: ICD-10-CM

## 2023-02-09 DIAGNOSIS — F11.90 CHRONIC, CONTINUOUS USE OF OPIOIDS: ICD-10-CM

## 2023-02-09 DIAGNOSIS — G89.29 CHRONIC BILATERAL LOW BACK PAIN WITHOUT SCIATICA: ICD-10-CM

## 2023-02-10 NOTE — TELEPHONE ENCOUNTER
Routing to ordering provider for consideration, not on refill protocol. Appears request on 2.6 didn't go through.           Sarah Robertson     RN MSN

## 2023-02-15 RX ORDER — HYDROCODONE BITARTRATE AND ACETAMINOPHEN 5; 325 MG/1; MG/1
1 TABLET ORAL EVERY 6 HOURS PRN
Qty: 30 TABLET | Refills: 0 | Status: SHIPPED | OUTPATIENT
Start: 2023-02-15 | End: 2023-03-13

## 2023-04-08 NOTE — PATIENT INSTRUCTIONS
Care Management Follow Up    Length of Stay (days): 2    Expected Discharge Date: 04/10/2023     Concerns to be Addressed: discharge planning     Patient plan of care discussed at interdisciplinary rounds: Yes    Anticipated Discharge Disposition:  TCU vs. Home with home care     Anticipated Discharge Services:  TCU vs. Home with home care  Anticipated Discharge DME:  As per care team recommendation    Patient/family educated on Medicare website which has current facility and service quality ratings:  yes  Education Provided on the Discharge Plan:    Patient/Family in Agreement with the Plan:      Referrals Placed by CM/SW:  Home care, TCU  Private pay costs discussed: Not applicable    Additional Information:  This writer met at length yesterday with patient and her daughter Lisbeth. Daughter Enid was on the phone. Patient is A & O x 4. Patient has been in and out of the hospital and care facilities since November. They are discouraged by the return of c-diff though they are relieved that GI has a plan and is referring patient to the FMT program. She was recently residing at [facility] for rehabilitation following a femur fracture. This writer confirmed with the facility that patient's bed is being held there. Family notes she only has 14 days left of skilled days in a facility. Family is frustrated that they haven't been able to get patient in to her other specialist, specifically pulmonology due to being in facilities. They feel no one is looking globally at the patient's overall health and are just putting bandaids on her problems.     Resources were given for [facility] and this writer left a message with [facility] to see if patient might qualify for their Chronic Disease case management program. Family requests that referrals be made to home care, in case patient recovers enough to be able to return home. Referral made and is pending.    Patient will not have a PICC on  Pain clinic.  Start on venlefaxine as instructed.  Follow-up with alta on 3/15/17.   discharge, per hospitalist.    Darby Painting, LICSW

## 2023-04-09 ENCOUNTER — HEALTH MAINTENANCE LETTER (OUTPATIENT)
Age: 63
End: 2023-04-09

## 2023-05-05 DIAGNOSIS — G47.9 DISTURBANCE IN SLEEP BEHAVIOR: ICD-10-CM

## 2023-05-05 RX ORDER — TRAZODONE HYDROCHLORIDE 50 MG/1
TABLET, FILM COATED ORAL
Qty: 180 TABLET | Refills: 0 | Status: SHIPPED | OUTPATIENT
Start: 2023-05-05 | End: 2023-09-21

## 2023-05-05 NOTE — TELEPHONE ENCOUNTER
Pending Prescriptions:                       Disp   Refills    traZODone (DESYREL) 50 MG tablet [Pharmacy*180 ta*0        Sig: Take 1 to 2 tablets by mouth nightly as needed for           sleep.    Routing refill request to provider for review/approval because:  Medication passes RN protocol; however, patient's last office visit was on 6/27/19. Has only been seen for virtual visits since then. Routing to PCP for further review, office visit needed for further refills?    Lyn Steinberg RN  [facility]

## 2023-05-08 DIAGNOSIS — M79.18 LUMBAR MUSCLE PAIN: ICD-10-CM

## 2023-05-08 NOTE — TELEPHONE ENCOUNTER
[facility]    Phone Message    May a detailed message be left on voicemail: yes     Reason for Call: Medication Refill Request    Has the patient contacted the pharmacy for the refill? Yes   Name of medication being requested: methocarbamol (ROBAXIN) 750 MG tablet  Provider who prescribed the medication: Jenna Parrish APRN CNP  Pharmacy: [facility] [address]  Date medication is needed: 5-7 days           Action Taken: Message routed to:  Other: [facility]    Travel Screening: Not Applicable

## 2023-05-09 RX ORDER — METHOCARBAMOL 750 MG/1
750-1500 TABLET, FILM COATED ORAL 4 TIMES DAILY PRN
Qty: 120 TABLET | Refills: 1 | Status: SHIPPED | OUTPATIENT
Start: 2023-05-09 | End: 2024-05-28

## 2023-05-09 NOTE — TELEPHONE ENCOUNTER
Received call from patient  requesting refill(s) for methocarbamol (ROBAXIN) 750 MG tablet     Patient last seen on 09/27/22  Next appt scheduled for None    E-prescribe to:     [facility] - [place], [place] - [address]     Will facilitate refill.      Casandra Lundberg MA  [facility] [place]

## 2023-05-09 NOTE — TELEPHONE ENCOUNTER
Signed Prescriptions:                        Disp   Refills    methocarbamol (ROBAXIN) 750 MG tablet      120 ta*1        Sig: Take 1-2 tablets (750-1,500 mg) by mouth 4 times           daily as needed for muscle spasms For muscle           spasms.  Authorizing Provider: MARYCRUZ MCDERMOTT, RN CNP, FNP  [facility]  [facility]

## 2023-06-07 ENCOUNTER — MYC MEDICAL ADVICE (OUTPATIENT)
Dept: FAMILY MEDICINE | Facility: CLINIC | Age: 63
End: 2023-06-07
Payer: COMMERCIAL

## 2023-06-07 DIAGNOSIS — Z13.228 SCREENING FOR ENDOCRINE, METABOLIC AND IMMUNITY DISORDER: ICD-10-CM

## 2023-06-07 DIAGNOSIS — Z13.0 SCREENING FOR ENDOCRINE, METABOLIC AND IMMUNITY DISORDER: ICD-10-CM

## 2023-06-07 DIAGNOSIS — Z13.29 SCREENING FOR ENDOCRINE, METABOLIC AND IMMUNITY DISORDER: ICD-10-CM

## 2023-06-07 DIAGNOSIS — Z13.220 LIPID SCREENING: ICD-10-CM

## 2023-06-07 DIAGNOSIS — Z12.5 SCREENING FOR PROSTATE CANCER: Primary | ICD-10-CM

## 2023-06-08 NOTE — TELEPHONE ENCOUNTER
Please advise these are the anticipated, routine labs. Additional lab draw may be needed at time of visit if symptoms warrant additional labs.

## 2023-06-08 NOTE — TELEPHONE ENCOUNTER
Please see my chart message.     Requesting labs prior to appointment, pended    Orders on file are     Cat Kilpatrick RN on 2023 at 7:57 AM

## 2023-06-12 ENCOUNTER — LAB (OUTPATIENT)
Dept: LAB | Facility: CLINIC | Age: 63
End: 2023-06-12
Payer: COMMERCIAL

## 2023-06-12 DIAGNOSIS — Z12.5 SCREENING FOR PROSTATE CANCER: ICD-10-CM

## 2023-06-12 DIAGNOSIS — Z13.0 SCREENING FOR ENDOCRINE, METABOLIC AND IMMUNITY DISORDER: ICD-10-CM

## 2023-06-12 DIAGNOSIS — Z13.220 LIPID SCREENING: ICD-10-CM

## 2023-06-12 DIAGNOSIS — Z13.228 SCREENING FOR ENDOCRINE, METABOLIC AND IMMUNITY DISORDER: ICD-10-CM

## 2023-06-12 DIAGNOSIS — Z13.29 SCREENING FOR ENDOCRINE, METABOLIC AND IMMUNITY DISORDER: ICD-10-CM

## 2023-06-12 LAB
ALBUMIN SERPL BCG-MCNC: 4.5 G/DL (ref 3.5–5.2)
ALP SERPL-CCNC: 71 U/L (ref 40–129)
ALT SERPL W P-5'-P-CCNC: 21 U/L (ref 10–50)
ANION GAP SERPL CALCULATED.3IONS-SCNC: 9 MMOL/L (ref 7–15)
AST SERPL W P-5'-P-CCNC: 28 U/L (ref 10–50)
BILIRUB SERPL-MCNC: 0.8 MG/DL
BUN SERPL-MCNC: 17 MG/DL (ref 8–23)
CALCIUM SERPL-MCNC: 9.5 MG/DL (ref 8.8–10.2)
CHLORIDE SERPL-SCNC: 102 MMOL/L (ref 98–107)
CHOLEST SERPL-MCNC: 142 MG/DL
CREAT SERPL-MCNC: 0.96 MG/DL (ref 0.67–1.17)
DEPRECATED HCO3 PLAS-SCNC: 28 MMOL/L (ref 22–29)
GFR SERPL CREATININE-BSD FRML MDRD: 88 ML/MIN/1.73M2
GLUCOSE SERPL-MCNC: 109 MG/DL (ref 70–99)
HDLC SERPL-MCNC: 33 MG/DL
LDLC SERPL CALC-MCNC: 38 MG/DL
NONHDLC SERPL-MCNC: 109 MG/DL
POTASSIUM SERPL-SCNC: 4.3 MMOL/L (ref 3.4–5.3)
PROT SERPL-MCNC: 7.1 G/DL (ref 6.4–8.3)
PSA SERPL DL<=0.01 NG/ML-MCNC: 3.29 NG/ML (ref 0–4.5)
SODIUM SERPL-SCNC: 139 MMOL/L (ref 136–145)
TRIGL SERPL-MCNC: 355 MG/DL

## 2023-06-12 PROCEDURE — 80053 COMPREHEN METABOLIC PANEL: CPT

## 2023-06-12 PROCEDURE — 36415 COLL VENOUS BLD VENIPUNCTURE: CPT

## 2023-06-12 PROCEDURE — 80061 LIPID PANEL: CPT

## 2023-06-12 PROCEDURE — G0103 PSA SCREENING: HCPCS

## 2023-06-14 ENCOUNTER — TRANSFERRED RECORDS (OUTPATIENT)
Dept: MULTI SPECIALTY CLINIC | Facility: CLINIC | Age: 63
End: 2023-06-14

## 2023-06-16 DIAGNOSIS — E78.5 HYPERLIPIDEMIA LDL GOAL <130: Chronic | ICD-10-CM

## 2023-06-19 ENCOUNTER — LAB (OUTPATIENT)
Dept: LAB | Facility: CLINIC | Age: 63
End: 2023-06-19
Payer: COMMERCIAL

## 2023-06-19 DIAGNOSIS — Z51.81 MEDICATION MONITORING ENCOUNTER: ICD-10-CM

## 2023-06-19 LAB — CREAT UR-MCNC: 61 MG/DL

## 2023-06-19 PROCEDURE — G0480 DRUG TEST DEF 1-7 CLASSES: HCPCS

## 2023-06-19 PROCEDURE — 2894A URINE DRUG CONFIRMATION PANEL: CPT

## 2023-06-20 ASSESSMENT — ENCOUNTER SYMPTOMS
WEAKNESS: 0
ABDOMINAL PAIN: 0
COUGH: 0
NERVOUS/ANXIOUS: 1
MYALGIAS: 0
PARESTHESIAS: 0
SORE THROAT: 0
NAUSEA: 0
HEMATURIA: 0
CONSTIPATION: 0
HEADACHES: 0
FEVER: 0
DIARRHEA: 0
HEARTBURN: 0
ARTHRALGIAS: 1
DIZZINESS: 0
DYSURIA: 0
EYE PAIN: 0
HEMATOCHEZIA: 0
CHILLS: 0
PALPITATIONS: 0
SHORTNESS OF BREATH: 1
FREQUENCY: 0

## 2023-06-20 ASSESSMENT — ACTIVITIES OF DAILY LIVING (ADL): CURRENT_FUNCTION: NO ASSISTANCE NEEDED

## 2023-06-21 ENCOUNTER — OFFICE VISIT (OUTPATIENT)
Dept: FAMILY MEDICINE | Facility: CLINIC | Age: 63
End: 2023-06-21
Payer: COMMERCIAL

## 2023-06-21 VITALS
HEART RATE: 62 BPM | SYSTOLIC BLOOD PRESSURE: 132 MMHG | DIASTOLIC BLOOD PRESSURE: 80 MMHG | TEMPERATURE: 97.3 F | OXYGEN SATURATION: 97 % | BODY MASS INDEX: 30.48 KG/M2 | WEIGHT: 225 LBS | HEIGHT: 72 IN | RESPIRATION RATE: 18 BRPM

## 2023-06-21 DIAGNOSIS — F11.90 CHRONIC, CONTINUOUS USE OF OPIOIDS: ICD-10-CM

## 2023-06-21 DIAGNOSIS — E78.5 HYPERLIPIDEMIA LDL GOAL <130: Primary | Chronic | ICD-10-CM

## 2023-06-21 LAB
CODEINE UR CFM-MCNC: 145 NG/ML
CODEINE/CREAT UR: 238 NG/MG {CREAT}
LORAZEPAM UR QL CFM: PRESENT

## 2023-06-21 PROCEDURE — 99214 OFFICE O/P EST MOD 30 MIN: CPT | Performed by: FAMILY MEDICINE

## 2023-06-21 RX ORDER — ATORVASTATIN CALCIUM 80 MG/1
80 TABLET, FILM COATED ORAL DAILY
Qty: 90 TABLET | Refills: 4 | Status: SHIPPED | OUTPATIENT
Start: 2023-06-21 | End: 2023-06-21

## 2023-06-21 RX ORDER — ATORVASTATIN CALCIUM 80 MG/1
80 TABLET, FILM COATED ORAL DAILY
Qty: 90 TABLET | Refills: 4 | Status: SHIPPED | OUTPATIENT
Start: 2023-06-21 | End: 2024-04-23

## 2023-06-21 ASSESSMENT — ENCOUNTER SYMPTOMS
WEAKNESS: 0
NAUSEA: 0
HEARTBURN: 0
ABDOMINAL PAIN: 0
HEMATOCHEZIA: 0
DIZZINESS: 0
SHORTNESS OF BREATH: 1
EYE PAIN: 0
FEVER: 0
HEADACHES: 0
NERVOUS/ANXIOUS: 1
CONSTIPATION: 0
COUGH: 0
DIARRHEA: 0
ARTHRALGIAS: 1
MYALGIAS: 0
HEMATURIA: 0
PARESTHESIAS: 0
SORE THROAT: 0
FREQUENCY: 0
CHILLS: 0
DYSURIA: 0
PALPITATIONS: 0

## 2023-06-21 ASSESSMENT — PAIN SCALES - GENERAL: PAINLEVEL: NO PAIN (0)

## 2023-06-21 ASSESSMENT — ACTIVITIES OF DAILY LIVING (ADL): CURRENT_FUNCTION: NO ASSISTANCE NEEDED

## 2023-06-21 NOTE — PATIENT INSTRUCTIONS
"    * * *    Per federal transparency in medicine laws, lab and imaging results are released \"real time\" into My Chart.  This may mean that you see the results before I have a chance to review them. My Chart will alert you again when I review the lab and enter comments.  Sometimes with imaging or labs there may be serious or unexpected results. Critical results are paged to me or the after hours on-call provider so that they can be reviewed immediately.  This is not true of non-critical abnormal results. Unfortunately, this means that it's possible you may be alerted of a serious finding before I have a chance to review it.  If you ever receive a result that you are concerned about and I have not already contacted you, please feel free to reach out to me or the care team so that you get the answers you need.    Additionally, it is my goal that you understand the care plan discussed at your visit and that any questions you have are answered.  Please feel free to reach out if you need clarification or explanation of any information addressed at your office visit.    "

## 2023-06-21 NOTE — PROGRESS NOTES
"  Assessment & Plan     Hyperlipidemia LDL goal <130  Uncontrolled. Increase lipitor to 80mg. Re-check labs in 3 month.  - Lipid panel reflex to direct LDL Fasting; Future  - atorvastatin (LIPITOR) 80 MG tablet; Take 1 tablet (80 mg) by mouth daily    F11.9 - Chronic, continuous use of opioids  On hydrocodone 5/325 #30/mo. Pain clinic returning prescribing to me. CSA signed ODELL obtained. He will call when he's due for refill.           BMI:   Estimated body mass index is 30.73 kg/m  as calculated from the following:    Height as of this encounter: 1.822 m (5' 11.75\").    Weight as of this encounter: 102.1 kg (225 lb).           Parrish Sears MD  [facility]    Hypertriglyceridemia. Increase atorvastatin. Re-check labs in 3 months. If triglycerides still elevated add fibrate.    Gee's neuroma of left foot.      Subjective   Zac is a 63 year old, presenting for the following health issues:  Physical         No data to display              Healthy Habits:     In general, how would you rate your overall health?  Fair    Frequency of exercise:  2-3 days/week    Duration of exercise:  Other    Do you usually eat at least 4 servings of fruit and vegetables a day, include whole grains    & fiber and avoid regularly eating high fat or \"junk\" foods?  Yes    Taking medications regularly:  Yes    Medication side effects:  None    Ability to successfully perform activities of daily living:  No assistance needed    Home Safety:  No safety concerns identified    Hearing Impairment:  Difficulty following a conversation in a noisy restaurant or crowded room and difficulty understanding soft or whispered speech    In the past 6 months, have you been bothered by leaking of urine?  No    In general, how would you rate your overall mental or emotional health?  Fair      PHQ-2 Total Score: 0    Additional concerns today:  Yes               Review of Systems   Constitutional: Negative for chills " "and fever.   HENT: Positive for hearing loss. Negative for congestion, ear pain and sore throat.    Eyes: Negative for pain and visual disturbance.   Respiratory: Positive for shortness of breath. Negative for cough.    Cardiovascular: Positive for peripheral edema. Negative for chest pain and palpitations.   Gastrointestinal: Negative for abdominal pain, constipation, diarrhea, heartburn, hematochezia and nausea.   Genitourinary: Negative for dysuria, frequency, genital sores, hematuria, impotence, penile discharge and urgency.   Musculoskeletal: Positive for arthralgias. Negative for myalgias.   Skin: Negative for rash.   Neurological: Negative for dizziness, weakness, headaches and paresthesias.   Psychiatric/Behavioral: Negative for mood changes. The patient is nervous/anxious.             Objective    /80   Pulse 62   Temp 97.3  F (36.3  C) (Tympanic)   Resp 18   Ht 1.822 m (5' 11.75\")   Wt 102.1 kg (225 lb)   SpO2 97%   BMI 30.73 kg/m    Body mass index is 30.73 kg/m .  Physical Exam   GENERAL: Pleasant, well appearing male.                    "

## 2023-06-21 NOTE — LETTER
Opioid / Opioid Plus Controlled Substance Agreement    This is an agreement between you and your provider about the safe and appropriate use of controlled substance/opioids prescribed by your care team. Controlled substances are medicines that can cause physical and mental dependence (abuse).    There are strict laws about having and using these medicines. We here at [facility] are committing to working with you in your efforts to get better. To support you in this work, we ll help you schedule regular office appointments for medicine refills. If we must cancel or change your appointment for any reason, we ll make sure you have enough medicine to last until your next appointment.     As a Provider, I will:  Listen carefully to your concerns and treat you with respect.   Recommend a treatment plan that I believe is in your best interest. This plan may involve therapies other than opioid pain medication.   Talk with you often about the possible benefits, and the risk of harm of any medicine that we prescribe for you.   Provide a plan on how to taper (discontinue or go off) using this medicine if the decision is made to stop its use.    As a Patient, I understand that opioid(s):   Are a controlled substance prescribed by my care team to help me function or work and manage my condition(s).   Are strong medicines and can cause serious side effects such as:  Drowsiness, which can seriously affect my driving ability  A lower breathing rate, enough to cause death  Harm to my thinking ability   Depression   Abuse of and addiction to this medicine  Need to be taken exactly as prescribed. Combining opioids with certain medicines or chemicals (such as illegal drugs, sedatives, sleeping pills, and benzodiazepines) can be dangerous or even fatal. If I stop opioids suddenly, I may have severe withdrawal symptoms.  Do not work for all types of pain nor for all patients. If they re not helpful, I may be asked to stop  them.        The risks, benefits and side effects of these medicine(s) were explained to me. I agree that:  I will take part in other treatments as advised by my care team. This may be psychiatry or counseling, physical therapy, behavioral therapy, group treatment or a referral to a specialist.     I will keep all my appointments. I understand that this is part of the monitoring of opioids. My care team may require an office visit for EVERY opioid/controlled substance refill. If I miss appointments or don t follow instructions, my care team may stop my medicine.    I will take my medicines as prescribed. I will not change the dose or schedule unless my care team tells me to. There will be no refills if I run out early.     I may be asked to come to the clinic and complete a urine drug test or complete a pill count at any time. If I don t give a urine sample or participate in a pill count, the care team may stop my medicine.    I will only receive prescriptions from this clinic for chronic pain. If I am treated by another provider for acute pain issues, I will tell them that I am taking opioid pain medication for chronic pain and that I have a treatment agreement with this provider. I will inform my [facility] care team within one business day if I am given a prescription for any pain medication by another healthcare provider. My [facility] care team can contact other providers and pharmacists about my use of any medicines.    It is up to me to make sure that I don t run out of my medicines on weekends or holidays. If my care team is willing to refill my opioid prescription without a visit, I must request refills only during office hours. Refills may take up to 3 business days to process. I will use one pharmacy to fill all my opioid and other controlled substance prescriptions. I will notify the clinic about any changes to my insurance or medication availability.    I am responsible for my  prescriptions. If the medicine/prescription is lost, stolen or destroyed, it will not be replaced. I also agree not to share controlled substance medicines with anyone.    I am aware I should not use any illegal or recreational drugs. I agree not to drink alcohol unless my care team says I can.       If I enroll in the [place] [facility] Cann[facility] program, I will tell my care team prior to my next refill.     I will tell my care team right away if I become pregnant, have a new medical problem treated outside of my regular clinic, or have a change in my medications.    I understand that this medicine can affect my thinking, judgment and reaction time. Alcohol and drugs affect the brain and body, which can affect the safety of my driving. Being under the influence of alcohol or drugs can affect my decision-making, behaviors, personal safety, and the safety of others. Driving while impaired (DWI) can occur if a person is driving, operating, or in physical control of a car, motorcycle, boat, snowmobile, ATV, motorbike, off-road vehicle, or any other motor vehicle ([place] Statute 169A.20). I understand the risk if I choose to drive or operate any vehicle or machinery.    I understand that if I do not follow any of the conditions above, my prescriptions or treatment may be stopped or changed.          Opioids  What You Need to Know    What are opioids?   Opioids are pain medicines that must be prescribed by a doctor. They are also known as narcotics.     Examples are:   morphine (MS Contin, Missy)  oxycodone (Oxycontin)  oxycodone and acetaminophen (Percocet)  hydrocodone and acetaminophen (Vicodin, Norco)   fentanyl patch (Duragesic)   hydromorphone (Dilaudid)   methadone  codeine (Tylenol #3)     What do opioids do well?   Opioids are best for severe short-term pain such as after a surgery or injury. They may work well for cancer pain. They may help some people with long-lasting (chronic) pain.     What do opioids NOT do  well?   Opioids never get rid of pain entirely, and they don t work well for most patients with chronic pain. Opioids don t reduce swelling, one of the causes of pain.                                    Other ways to manage chronic pain and improve function include:     Treat the health problem that may be causing pain  Anti-inflammation medicines, which reduce swelling and tenderness, such as ibuprofen (Advil, Motrin) or naproxen (Aleve)  Acetaminophen (Tylenol)  Antidepressants and anti-seizure medicines, especially for nerve pain  Topical treatments such as patches or creams  Injections or nerve blocks  Chiropractic or osteopathic treatment  Acupuncture, massage, deep breathing, meditation, visual imagery, aromatherapy  Use heat or ice at the pain site  Physical therapy   Exercise  Stop smoking  Take part in therapy       Risks and side effects     Talk to your doctor before you start or decide to keep taking opioids. Possible side effects include:    Lowering your breathing rate enough to cause death  Overdose, including death, especially if taking higher than prescribed doses  Worse depression symptoms; less pleasure in things you usually enjoy  Feeling tired or sluggish  Slower thoughts or cloudy thinking  Being more sensitive to pain over time; pain is harder to control  Trouble sleeping or restless sleep  Changes in hormone levels (for example, less testosterone)  Changes in sex drive or ability to have sex  Constipation  Unsafe driving  Itching and sweating  Dizziness  Nausea, throwing up and dry mouth    What else should I know about opioids?    Opioids may lead to dependence, tolerance, or addiction.    Dependence means that if you stop or reduce the medicine too quickly, you will have withdrawal symptoms. These include loose poop (diarrhea), jitters, flu-like symptoms, nervousness and tremors. Dependence is not the same as addiction.                     Tolerance means needing higher doses over time to  get the same effect. This may increase the chance of serious side effects.    Addiction is when people improperly use a substance that harms their body, their mind or their relations with others. Use of opiates can cause a relapse of addiction if you have a history of drug or alcohol abuse.    People who have used opioids for a long time may have a lower quality of life, worse depression, higher levels of pain and more visits to doctors.    You can overdose on opioids. Take these steps to lower your risk of overdose:    Recognize the signs:  Signs of overdose include decrease or loss of consciousness (blackout), slowed breathing, trouble waking up and blue lips. If someone is worried about overdose, they should call 911.    Talk to your doctor about Narcan (naloxone).   If you are at risk for overdose, you may be given a prescription for Narcan. This medicine very quickly reverses the effects of opioids.   If you overdose, a friend or family member can give you Narcan while waiting for the ambulance. They need to know the signs of overdose and how to give Narcan.     Don't use alcohol or street drugs.   Taking them with opioids can cause death.    Do not take any of these medicines unless your doctor says it s OK. Taking these with opioids can cause death:  Benzodiazepines, such as lorazepam (Ativan), alprazolam (Xanax) or diazepam (Valium)  Muscle relaxers, such as cyclobenzaprine (Flexeril)  Sleeping pills like zolpidem (Ambien)   Other opioids      How to keep you and other people safe while taking opioids:    Never share your opioids with others.  Opioid medicines are regulated by the Drug Enforcement Agency (SANDRA). Selling or sharing medications is a criminal act.    2. Be sure to store opioids in a secure place, locked up if possible. Young children can easily swallow them and overdose.    3. When you are traveling with your medicines, keep them in the original bottles. If you use a pill box, be sure you also  carry a copy of your medicine list from your clinic or pharmacy.    4. Safe disposal of opioids    Most pharmacies have places to get rid of medicine, called disposal kiosks. Medicine disposal options are also available in every [facility]. Search your county and  medication disposal  to find more options. You can find more details at:  [email or web address]     I agree that my provider, clinic care team, and pharmacy may work with any city, state or federal law enforcement agency that investigates the misuse, sale, or other diversion of my controlled medicine. I will allow my provider to discuss my care with, or share a copy of, this agreement with any other treating provider, pharmacy or emergency room where I receive care.    I have read this agreement and have asked questions about anything I did not understand.    _______________________________________________________  Patient Signature - Mata Poe _____________________                   Date     _______________________________________________________  Provider Signature - Parrish Sears MD   _____________________                   Date     _______________________________________________________  Witness Signature (required if provider not present while patient signing)   _____________________                   Date

## 2023-06-25 ENCOUNTER — MYC MEDICAL ADVICE (OUTPATIENT)
Dept: FAMILY MEDICINE | Facility: CLINIC | Age: 63
End: 2023-06-25
Payer: COMMERCIAL

## 2023-06-25 DIAGNOSIS — F41.8 MIXED ANXIETY DEPRESSIVE DISORDER: Chronic | ICD-10-CM

## 2023-06-26 RX ORDER — LORAZEPAM 1 MG/1
TABLET ORAL
Qty: 15 TABLET | Refills: 0 | Status: SHIPPED | OUTPATIENT
Start: 2023-06-26 | End: 2023-10-03

## 2023-06-26 NOTE — TELEPHONE ENCOUNTER
Patient reply    Understands the interaction with narcs and benzos    Cat Kilpatrick RN on 6/26/2023 at 12:04 PM

## 2023-07-12 ENCOUNTER — MYC REFILL (OUTPATIENT)
Dept: FAMILY MEDICINE | Facility: CLINIC | Age: 63
End: 2023-07-12
Payer: COMMERCIAL

## 2023-07-12 DIAGNOSIS — G90.522 COMPLEX REGIONAL PAIN SYNDROME TYPE 1 OF LEFT LOWER EXTREMITY: ICD-10-CM

## 2023-07-12 DIAGNOSIS — M54.59 LUMBAR FACET JOINT PAIN: ICD-10-CM

## 2023-07-12 DIAGNOSIS — F11.90 CHRONIC, CONTINUOUS USE OF OPIOIDS: ICD-10-CM

## 2023-07-12 DIAGNOSIS — M79.672 LEFT FOOT PAIN: ICD-10-CM

## 2023-07-12 DIAGNOSIS — G89.29 CHRONIC BILATERAL LOW BACK PAIN WITHOUT SCIATICA: ICD-10-CM

## 2023-07-12 DIAGNOSIS — M54.50 CHRONIC BILATERAL LOW BACK PAIN WITHOUT SCIATICA: ICD-10-CM

## 2023-07-12 DIAGNOSIS — M47.816 SPONDYLOSIS OF LUMBAR REGION WITHOUT MYELOPATHY OR RADICULOPATHY: ICD-10-CM

## 2023-07-14 RX ORDER — HYDROCODONE BITARTRATE AND ACETAMINOPHEN 5; 325 MG/1; MG/1
1 TABLET ORAL EVERY 6 HOURS PRN
Qty: 30 TABLET | Refills: 0 | Status: SHIPPED | OUTPATIENT
Start: 2023-07-14 | End: 2023-08-11

## 2023-08-11 ENCOUNTER — MYC REFILL (OUTPATIENT)
Dept: FAMILY MEDICINE | Facility: CLINIC | Age: 63
End: 2023-08-11
Payer: COMMERCIAL

## 2023-08-11 DIAGNOSIS — M54.59 LUMBAR FACET JOINT PAIN: ICD-10-CM

## 2023-08-11 DIAGNOSIS — F11.90 CHRONIC, CONTINUOUS USE OF OPIOIDS: ICD-10-CM

## 2023-08-11 DIAGNOSIS — M79.672 LEFT FOOT PAIN: ICD-10-CM

## 2023-08-11 DIAGNOSIS — G90.522 COMPLEX REGIONAL PAIN SYNDROME TYPE 1 OF LEFT LOWER EXTREMITY: ICD-10-CM

## 2023-08-11 DIAGNOSIS — M47.816 SPONDYLOSIS OF LUMBAR REGION WITHOUT MYELOPATHY OR RADICULOPATHY: ICD-10-CM

## 2023-08-11 DIAGNOSIS — G89.29 CHRONIC BILATERAL LOW BACK PAIN WITHOUT SCIATICA: ICD-10-CM

## 2023-08-11 DIAGNOSIS — M54.50 CHRONIC BILATERAL LOW BACK PAIN WITHOUT SCIATICA: ICD-10-CM

## 2023-08-11 RX ORDER — HYDROCODONE BITARTRATE AND ACETAMINOPHEN 5; 325 MG/1; MG/1
1 TABLET ORAL EVERY 6 HOURS PRN
Qty: 30 TABLET | Refills: 0 | Status: SHIPPED | OUTPATIENT
Start: 2023-08-11 | End: 2024-01-18

## 2023-08-11 NOTE — TELEPHONE ENCOUNTER
Requested Prescriptions   Pending Prescriptions Disp Refills    HYDROcodone-acetaminophen (NORCO) 5-325 MG tablet 30 tablet 0     Sig: Take 1 tablet by mouth every 6 hours as needed for severe pain Max of 2 tabs per day. Use sparingly.       There is no refill protocol information for this order        Routing refill request to provider for review/approval because:  Drug not on the [facility] refill protocol     Eduarda Mclaughlin RN on 8/11/2023 at 3:18 PM

## 2023-09-13 ENCOUNTER — LAB (OUTPATIENT)
Dept: LAB | Facility: CLINIC | Age: 63
End: 2023-09-13
Payer: COMMERCIAL

## 2023-09-13 DIAGNOSIS — E78.5 HYPERLIPIDEMIA LDL GOAL <130: Chronic | ICD-10-CM

## 2023-09-13 LAB
CHOLEST SERPL-MCNC: 157 MG/DL
HDLC SERPL-MCNC: 35 MG/DL
LDLC SERPL CALC-MCNC: 59 MG/DL
NONHDLC SERPL-MCNC: 122 MG/DL
TRIGL SERPL-MCNC: 313 MG/DL

## 2023-09-13 PROCEDURE — 36415 COLL VENOUS BLD VENIPUNCTURE: CPT

## 2023-09-13 PROCEDURE — 80061 LIPID PANEL: CPT

## 2023-09-21 DIAGNOSIS — G47.9 DISTURBANCE IN SLEEP BEHAVIOR: ICD-10-CM

## 2023-09-21 RX ORDER — TRAZODONE HYDROCHLORIDE 50 MG/1
TABLET, FILM COATED ORAL
Qty: 180 TABLET | Refills: 1 | Status: SHIPPED | OUTPATIENT
Start: 2023-09-21 | End: 2024-06-17

## 2023-10-03 ENCOUNTER — MYC REFILL (OUTPATIENT)
Dept: FAMILY MEDICINE | Facility: CLINIC | Age: 63
End: 2023-10-03
Payer: COMMERCIAL

## 2023-10-03 DIAGNOSIS — F41.8 MIXED ANXIETY DEPRESSIVE DISORDER: Chronic | ICD-10-CM

## 2023-10-04 RX ORDER — LORAZEPAM 1 MG/1
TABLET ORAL
Qty: 15 TABLET | Refills: 0 | Status: SHIPPED | OUTPATIENT
Start: 2023-10-04 | End: 2023-11-07

## 2023-10-14 ENCOUNTER — MYC REFILL (OUTPATIENT)
Dept: FAMILY MEDICINE | Facility: CLINIC | Age: 63
End: 2023-10-14
Payer: COMMERCIAL

## 2023-10-14 DIAGNOSIS — G90.522 COMPLEX REGIONAL PAIN SYNDROME TYPE 1 OF LEFT LOWER EXTREMITY: ICD-10-CM

## 2023-10-14 DIAGNOSIS — M47.816 SPONDYLOSIS OF LUMBAR REGION WITHOUT MYELOPATHY OR RADICULOPATHY: ICD-10-CM

## 2023-10-14 DIAGNOSIS — F11.90 CHRONIC, CONTINUOUS USE OF OPIOIDS: ICD-10-CM

## 2023-10-14 DIAGNOSIS — M54.50 CHRONIC BILATERAL LOW BACK PAIN WITHOUT SCIATICA: ICD-10-CM

## 2023-10-14 DIAGNOSIS — M79.672 LEFT FOOT PAIN: ICD-10-CM

## 2023-10-14 DIAGNOSIS — M54.59 LUMBAR FACET JOINT PAIN: ICD-10-CM

## 2023-10-14 DIAGNOSIS — G89.29 CHRONIC BILATERAL LOW BACK PAIN WITHOUT SCIATICA: ICD-10-CM

## 2023-10-18 RX ORDER — HYDROCODONE BITARTRATE AND ACETAMINOPHEN 5; 325 MG/1; MG/1
1 TABLET ORAL EVERY 6 HOURS PRN
Qty: 30 TABLET | Refills: 0 | Status: SHIPPED | OUTPATIENT
Start: 2023-10-18 | End: 2023-11-16

## 2023-11-07 ENCOUNTER — MYC REFILL (OUTPATIENT)
Dept: FAMILY MEDICINE | Facility: CLINIC | Age: 63
End: 2023-11-07
Payer: COMMERCIAL

## 2023-11-07 DIAGNOSIS — F41.8 MIXED ANXIETY DEPRESSIVE DISORDER: Chronic | ICD-10-CM

## 2023-11-08 NOTE — TELEPHONE ENCOUNTER
Pending Prescriptions:                       Disp   Refills    LORazepam (ATIVAN) 1 MG tablet            15 tab*0            Sig: TAKE ONE-HALF TABLET BY  MOUTH EVERY 8 HOURS AS            NEEDED FOR ANXIETY Strength: 1 mg    Routing refill request to provider for review/approval because:  Drug not on the [facility] refill protocol       Jero Li RN

## 2023-11-10 ENCOUNTER — MYC MEDICAL ADVICE (OUTPATIENT)
Dept: FAMILY MEDICINE | Facility: CLINIC | Age: 63
End: 2023-11-10
Payer: COMMERCIAL

## 2023-11-10 RX ORDER — LORAZEPAM 1 MG/1
TABLET ORAL
Qty: 15 TABLET | Refills: 0 | Status: SHIPPED | OUTPATIENT
Start: 2023-11-10 | End: 2023-12-11

## 2023-11-10 NOTE — TELEPHONE ENCOUNTER
Called patient to find out if there have been changes or new pain related issues. Patient reports no changes or new issues today.    Patient missed an appointment with Pain & Palliative care in June and when he called to speak with Jenna Parrish's team they told him that he needs a new referral from PCP to be seen again.    Patient wants to discuss a treatment options with Pain & Palliative for left foot that has choric complex pain that has been present since 2004.     Patient was supposed to follow up with Dr. Sears in September. Patient scheduled for a virtual visit with Dr. Sears 11/16/23.    Patient is in [place] for the winter and will return this spring. Patient aware he is past due for his Medicare Annual Wellness Visit and will schedule this for next year when he returns.      Eduarda Mclaughlin RN on 11/10/2023 at 4:23 PM

## 2023-11-11 ENCOUNTER — MYC MEDICAL ADVICE (OUTPATIENT)
Dept: FAMILY MEDICINE | Facility: CLINIC | Age: 63
End: 2023-11-11
Payer: COMMERCIAL

## 2023-11-15 NOTE — TELEPHONE ENCOUNTER
It does not look like he scheduled the visit in June as a wellness/physical exam -which is why that was not done.  It looks like it was scheduled as a med check.  Additionally, for patients on chronic narcotic pain medications every 3 month visits are required -due to the regulatory guidelines and close monitoring advised for those medications since they are potentially habit-forming and a controlled substance. The confusion likely comes in is that he was managed by the pain clinic until he went to [place].  Since they were not able to prescribe to [place] I did a favor for him and the pain clinic by covering his prescriptions until he returned to [place].  Upon his return he saw the pain clinic and then they transferred pain management back to me.   He does need to be seen for ongoing pain medication refills.

## 2023-11-16 ENCOUNTER — VIRTUAL VISIT (OUTPATIENT)
Dept: FAMILY MEDICINE | Facility: CLINIC | Age: 63
End: 2023-11-16
Payer: COMMERCIAL

## 2023-11-16 DIAGNOSIS — G90.522 COMPLEX REGIONAL PAIN SYNDROME TYPE 1 OF LEFT LOWER EXTREMITY: ICD-10-CM

## 2023-11-16 DIAGNOSIS — F11.90 CHRONIC, CONTINUOUS USE OF OPIOIDS: ICD-10-CM

## 2023-11-16 DIAGNOSIS — M47.816 SPONDYLOSIS OF LUMBAR REGION WITHOUT MYELOPATHY OR RADICULOPATHY: ICD-10-CM

## 2023-11-16 DIAGNOSIS — M54.42 ACUTE LEFT-SIDED LOW BACK PAIN WITH LEFT-SIDED SCIATICA: Primary | ICD-10-CM

## 2023-11-16 PROCEDURE — 99213 OFFICE O/P EST LOW 20 MIN: CPT | Mod: 95 | Performed by: FAMILY MEDICINE

## 2023-11-16 RX ORDER — HYDROCODONE BITARTRATE AND ACETAMINOPHEN 5; 325 MG/1; MG/1
1 TABLET ORAL EVERY 6 HOURS PRN
Qty: 30 TABLET | Refills: 0 | Status: SHIPPED | OUTPATIENT
Start: 2023-11-16 | End: 2023-12-15

## 2023-11-16 ASSESSMENT — ANXIETY QUESTIONNAIRES
IF YOU CHECKED OFF ANY PROBLEMS ON THIS QUESTIONNAIRE, HOW DIFFICULT HAVE THESE PROBLEMS MADE IT FOR YOU TO DO YOUR WORK, TAKE CARE OF THINGS AT HOME, OR GET ALONG WITH OTHER PEOPLE: SOMEWHAT DIFFICULT
GAD7 TOTAL SCORE: 1
6. BECOMING EASILY ANNOYED OR IRRITABLE: NOT AT ALL
4. TROUBLE RELAXING: NOT AT ALL
3. WORRYING TOO MUCH ABOUT DIFFERENT THINGS: NOT AT ALL
GAD7 TOTAL SCORE: 1
1. FEELING NERVOUS, ANXIOUS, OR ON EDGE: SEVERAL DAYS
7. FEELING AFRAID AS IF SOMETHING AWFUL MIGHT HAPPEN: NOT AT ALL
2. NOT BEING ABLE TO STOP OR CONTROL WORRYING: NOT AT ALL
5. BEING SO RESTLESS THAT IT IS HARD TO SIT STILL: NOT AT ALL

## 2023-11-16 NOTE — NURSING NOTE
"Initial There were no vitals taken for this visit. Estimated body mass index is 30.73 kg/m  as calculated from the following:    Height as of 6/21/23: 1.822 m (5' 11.75\").    Weight as of 6/21/23: 102.1 kg (225 lb). .    "

## 2023-11-16 NOTE — PROGRESS NOTES
"Zac is a 63 year old who is being evaluated via a billable video visit.      How would you like to obtain your AVS? MyChart  If the video visit is dropped, the invitation should be resent by: Text to cell phone: [phone]  Will anyone else be joining your video visit? No          Assessment & Plan         Acute left-sided low back pain with left-sided sciatica  He was previously seen for back pain and left foot pain.   Had previous injections. Would like to discuss procedural/other treatment options.  - Pain Management  Referral; Future    Complex regional pain syndrome type 1 of left lower extremity  He is planing on wintering in [place] again this year. Overall stable on #30 Norco/month. CSA, ODELL, JACOB and PHQ-9 up to date. Follow-up in 3 month for medication-check.  - HYDROcodone-acetaminophen (NORCO) 5-325 MG tablet; Take 1 tablet by mouth every 6 hours as needed for severe pain Max of 2 tabs per day. Use sparingly.    Spondylosis of lumbar region without myelopathy or radiculopathy  See above.   - HYDROcodone-acetaminophen (NORCO) 5-325 MG tablet; Take 1 tablet by mouth every 6 hours as needed for severe pain Max of 2 tabs per day. Use sparingly.    Chronic, continuous use of opioids  Stable See above.   - HYDROcodone-acetaminophen (NORCO) 5-325 MG tablet; Take 1 tablet by mouth every 6 hours as needed for severe pain Max of 2 tabs per day. Use sparingly.             BMI:   Estimated body mass index is 30.73 kg/m  as calculated from the following:    Height as of 6/21/23: 1.822 m (5' 11.75\").    Weight as of 6/21/23: 102.1 kg (225 lb).           Parrish Sears MD  [facility]   Zac is a 63 year old, presenting for the following health issues:  Patient Request      11/16/2023     1:22 PM   Additional Questions   Roomed by Sara VELOZ CMA       History of Present Illness       Reason for visit:  Following up from June visit    He eats 4 or more servings " of fruits and vegetables daily.He consumes 0 sweetened beverage(s) daily.He exercises with enough effort to increase his heart rate 10 to 19 minutes per day.  He exercises with enough effort to increase his heart rate 4 days per week.   He is taking medications regularly.               Review of Systems   Constitutional, neuro, ENT, endocrine, pulmonary, cardiac, gastrointestinal, genitourinary, musculoskeletal, integument and psychiatric systems are negative, except as otherwise noted.       Objective           Vitals:  No vitals were obtained today due to virtual visit.    Physical Exam   GENERAL: Healthy, alert and no distress  EYES: Eyes grossly normal to inspection.  No discharge or erythema, or obvious scleral/conjunctival abnormalities.  RESP: No audible wheeze, cough, or visible cyanosis.  No visible retractions or increased work of breathing.    SKIN: Visible skin clear. No significant rash, abnormal pigmentation or lesions.  NEURO: Cranial nerves grossly intact.  Mentation and speech appropriate for age.  PSYCH: Mentation appears normal, affect normal/bright, judgement and insight intact, normal speech and appearance well-groomed.                Video-Visit Details    Type of service:  Video Visit   Video Start Time:  3:40pm  Video End Time:3:55pm    Originating Location (pt. Location): Home    Distant Location (provider location):  On-site  Platform used for Video Visit: Olivier

## 2023-12-11 ENCOUNTER — MYC REFILL (OUTPATIENT)
Dept: FAMILY MEDICINE | Facility: CLINIC | Age: 63
End: 2023-12-11
Payer: COMMERCIAL

## 2023-12-11 DIAGNOSIS — F41.8 MIXED ANXIETY DEPRESSIVE DISORDER: Chronic | ICD-10-CM

## 2023-12-13 RX ORDER — LORAZEPAM 1 MG/1
TABLET ORAL
Qty: 15 TABLET | Refills: 0 | Status: SHIPPED | OUTPATIENT
Start: 2023-12-13 | End: 2024-02-01

## 2023-12-15 ENCOUNTER — MYC REFILL (OUTPATIENT)
Dept: FAMILY MEDICINE | Facility: CLINIC | Age: 63
End: 2023-12-15
Payer: COMMERCIAL

## 2023-12-15 DIAGNOSIS — F11.90 CHRONIC, CONTINUOUS USE OF OPIOIDS: ICD-10-CM

## 2023-12-15 DIAGNOSIS — M47.816 SPONDYLOSIS OF LUMBAR REGION WITHOUT MYELOPATHY OR RADICULOPATHY: ICD-10-CM

## 2023-12-15 DIAGNOSIS — G90.522 COMPLEX REGIONAL PAIN SYNDROME TYPE 1 OF LEFT LOWER EXTREMITY: ICD-10-CM

## 2023-12-15 RX ORDER — HYDROCODONE BITARTRATE AND ACETAMINOPHEN 5; 325 MG/1; MG/1
1 TABLET ORAL EVERY 6 HOURS PRN
Qty: 30 TABLET | Refills: 0 | Status: SHIPPED | OUTPATIENT
Start: 2023-12-15 | End: 2024-02-15

## 2024-01-26 ENCOUNTER — MYC REFILL (OUTPATIENT)
Dept: FAMILY MEDICINE | Facility: CLINIC | Age: 64
End: 2024-01-26
Payer: COMMERCIAL

## 2024-01-26 DIAGNOSIS — F41.8 MIXED ANXIETY DEPRESSIVE DISORDER: Chronic | ICD-10-CM

## 2024-01-31 RX ORDER — LORAZEPAM 1 MG/1
TABLET ORAL
Qty: 15 TABLET | Refills: 0 | OUTPATIENT
Start: 2024-01-31

## 2024-01-31 NOTE — TELEPHONE ENCOUNTER
Previous lorazepam refills had been fairly infrequent.  He is requested refills monthly now for the last 3 months.  This should not be taken with any regularity while also on hydrocodone and the 2 should never be combined.  I am just wondering what the change has been that he has had increased refill request for lorazepam.  If anxiety not well-controlled then we should look for alternative treatment options if needing to treat more frequently.

## 2024-02-01 RX ORDER — LORAZEPAM 1 MG/1
TABLET ORAL
Qty: 15 TABLET | Refills: 0 | Status: SHIPPED | OUTPATIENT
Start: 2024-02-01 | End: 2024-06-19

## 2024-02-01 NOTE — TELEPHONE ENCOUNTER
If pill bottles are closed completely they should be safe from humidity.  Make sure he screws them all the way shut.  I will send a refill but this needs to last him at least 2-3 months.

## 2024-03-14 ENCOUNTER — MYC REFILL (OUTPATIENT)
Dept: FAMILY MEDICINE | Facility: CLINIC | Age: 64
End: 2024-03-14
Payer: COMMERCIAL

## 2024-03-14 DIAGNOSIS — G90.522 COMPLEX REGIONAL PAIN SYNDROME TYPE 1 OF LEFT LOWER EXTREMITY: ICD-10-CM

## 2024-03-14 DIAGNOSIS — M47.816 SPONDYLOSIS OF LUMBAR REGION WITHOUT MYELOPATHY OR RADICULOPATHY: ICD-10-CM

## 2024-03-14 DIAGNOSIS — F11.90 CHRONIC, CONTINUOUS USE OF OPIOIDS: ICD-10-CM

## 2024-03-15 RX ORDER — HYDROCODONE BITARTRATE AND ACETAMINOPHEN 5; 325 MG/1; MG/1
1 TABLET ORAL EVERY 6 HOURS PRN
Qty: 30 TABLET | Refills: 0 | Status: SHIPPED | OUTPATIENT
Start: 2024-03-15 | End: 2024-04-15

## 2024-04-07 ENCOUNTER — HEALTH MAINTENANCE LETTER (OUTPATIENT)
Age: 64
End: 2024-04-07

## 2024-04-23 ENCOUNTER — TELEPHONE (OUTPATIENT)
Dept: FAMILY MEDICINE | Facility: CLINIC | Age: 64
End: 2024-04-23
Payer: COMMERCIAL

## 2024-04-23 DIAGNOSIS — E78.5 HYPERLIPIDEMIA LDL GOAL <130: Chronic | ICD-10-CM

## 2024-04-23 RX ORDER — ATORVASTATIN CALCIUM 80 MG/1
80 TABLET, FILM COATED ORAL DAILY
Qty: 100 TABLET | Refills: 2 | Status: SHIPPED | OUTPATIENT
Start: 2024-04-23 | End: 2024-09-11

## 2024-04-23 NOTE — TELEPHONE ENCOUNTER
Patient has [facility] coverage and with this insurance plan, the patient is eligible to receive certain prescriptions as a 100-day supply at the 90-day supply cost.      Prescriptions updated to 100-day supply: george To, PharmD, [facility]  Population Health Pharmacist  [phone]

## 2024-05-14 ENCOUNTER — MYC REFILL (OUTPATIENT)
Dept: FAMILY MEDICINE | Facility: CLINIC | Age: 64
End: 2024-05-14
Payer: COMMERCIAL

## 2024-05-14 ENCOUNTER — MYC MEDICAL ADVICE (OUTPATIENT)
Dept: FAMILY MEDICINE | Facility: CLINIC | Age: 64
End: 2024-05-14
Payer: COMMERCIAL

## 2024-05-14 DIAGNOSIS — Z51.81 MEDICATION MONITORING ENCOUNTER: ICD-10-CM

## 2024-05-14 DIAGNOSIS — E78.5 HYPERLIPIDEMIA LDL GOAL <130: ICD-10-CM

## 2024-05-14 DIAGNOSIS — Z12.5 SCREENING FOR PROSTATE CANCER: Primary | ICD-10-CM

## 2024-05-14 DIAGNOSIS — M47.816 SPONDYLOSIS OF LUMBAR REGION WITHOUT MYELOPATHY OR RADICULOPATHY: ICD-10-CM

## 2024-05-14 DIAGNOSIS — R73.01 ELEVATED FASTING GLUCOSE: ICD-10-CM

## 2024-05-14 DIAGNOSIS — G90.522 COMPLEX REGIONAL PAIN SYNDROME TYPE 1 OF LEFT LOWER EXTREMITY: ICD-10-CM

## 2024-05-14 DIAGNOSIS — F11.90 CHRONIC, CONTINUOUS USE OF OPIOIDS: ICD-10-CM

## 2024-05-14 RX ORDER — HYDROCODONE BITARTRATE AND ACETAMINOPHEN 5; 325 MG/1; MG/1
1 TABLET ORAL EVERY 6 HOURS PRN
Qty: 30 TABLET | Refills: 0 | Status: SHIPPED | OUTPATIENT
Start: 2024-05-14 | End: 2024-06-17

## 2024-05-28 DIAGNOSIS — M79.18 LUMBAR MUSCLE PAIN: ICD-10-CM

## 2024-05-29 RX ORDER — METHOCARBAMOL 750 MG/1
750-1500 TABLET, FILM COATED ORAL 4 TIMES DAILY PRN
Qty: 120 TABLET | Refills: 1 | Status: SHIPPED | OUTPATIENT
Start: 2024-05-29 | End: 2024-07-15

## 2024-06-06 DIAGNOSIS — G47.9 DISTURBANCE IN SLEEP BEHAVIOR: ICD-10-CM

## 2024-06-06 NOTE — TELEPHONE ENCOUNTER
Pending Prescriptions:                       Disp   Refills    famotidine (PEPCID) 20 MG tablet [Pharmac*180 ta*1            Sig: Take 1 tablet by mouth twice daily.    Routing refill request to provider for review/approval because:  Medication indicated for associated diagnosis       Jero Li RN

## 2024-06-07 RX ORDER — FAMOTIDINE 20 MG/1
TABLET, FILM COATED ORAL
Qty: 180 TABLET | Refills: 4 | Status: SHIPPED | OUTPATIENT
Start: 2024-06-07

## 2024-06-13 ASSESSMENT — PAIN SCALES - PAIN ENJOYMENT GENERAL ACTIVITY SCALE (PEG)
INTERFERED_GENERAL_ACTIVITY: 4
AVG_PAIN_PASTWEEK: 8
INTERFERED_ENJOYMENT_LIFE: 5
INTERFERED_ENJOYMENT_LIFE: 5
PEG_TOTALSCORE: 5.67
AVG_PAIN_PASTWEEK: 8
INTERFERED_GENERAL_ACTIVITY: 4
PEG_TOTALSCORE: 5.67

## 2024-06-13 ASSESSMENT — ANXIETY QUESTIONNAIRES
GAD7 TOTAL SCORE: 2
1. FEELING NERVOUS, ANXIOUS, OR ON EDGE: SEVERAL DAYS
IF YOU CHECKED OFF ANY PROBLEMS ON THIS QUESTIONNAIRE, HOW DIFFICULT HAVE THESE PROBLEMS MADE IT FOR YOU TO DO YOUR WORK, TAKE CARE OF THINGS AT HOME, OR GET ALONG WITH OTHER PEOPLE: SOMEWHAT DIFFICULT
4. TROUBLE RELAXING: NOT AT ALL
2. NOT BEING ABLE TO STOP OR CONTROL WORRYING: NOT AT ALL
8. IF YOU CHECKED OFF ANY PROBLEMS, HOW DIFFICULT HAVE THESE MADE IT FOR YOU TO DO YOUR WORK, TAKE CARE OF THINGS AT HOME, OR GET ALONG WITH OTHER PEOPLE?: SOMEWHAT DIFFICULT
3. WORRYING TOO MUCH ABOUT DIFFERENT THINGS: NOT AT ALL
7. FEELING AFRAID AS IF SOMETHING AWFUL MIGHT HAPPEN: NOT AT ALL
6. BECOMING EASILY ANNOYED OR IRRITABLE: NOT AT ALL
7. FEELING AFRAID AS IF SOMETHING AWFUL MIGHT HAPPEN: NOT AT ALL
GAD7 TOTAL SCORE: 2
5. BEING SO RESTLESS THAT IT IS HARD TO SIT STILL: SEVERAL DAYS

## 2024-06-17 ENCOUNTER — OFFICE VISIT (OUTPATIENT)
Dept: PALLIATIVE MEDICINE | Facility: CLINIC | Age: 64
End: 2024-06-17
Attending: FAMILY MEDICINE
Payer: COMMERCIAL

## 2024-06-17 ENCOUNTER — LAB (OUTPATIENT)
Dept: LAB | Facility: CLINIC | Age: 64
End: 2024-06-17
Payer: COMMERCIAL

## 2024-06-17 ENCOUNTER — MYC REFILL (OUTPATIENT)
Dept: FAMILY MEDICINE | Facility: CLINIC | Age: 64
End: 2024-06-17

## 2024-06-17 VITALS — DIASTOLIC BLOOD PRESSURE: 85 MMHG | HEART RATE: 59 BPM | SYSTOLIC BLOOD PRESSURE: 127 MMHG

## 2024-06-17 DIAGNOSIS — G90.522 COMPLEX REGIONAL PAIN SYNDROME TYPE 1 OF LEFT LOWER EXTREMITY: Primary | ICD-10-CM

## 2024-06-17 DIAGNOSIS — M47.816 SPONDYLOSIS OF LUMBAR REGION WITHOUT MYELOPATHY OR RADICULOPATHY: ICD-10-CM

## 2024-06-17 DIAGNOSIS — M54.50 CHRONIC BILATERAL LOW BACK PAIN WITHOUT SCIATICA: ICD-10-CM

## 2024-06-17 DIAGNOSIS — G89.29 CHRONIC LEFT SHOULDER PAIN: ICD-10-CM

## 2024-06-17 DIAGNOSIS — E78.5 HYPERLIPIDEMIA LDL GOAL <130: ICD-10-CM

## 2024-06-17 DIAGNOSIS — M25.561 CHRONIC PAIN OF RIGHT KNEE: ICD-10-CM

## 2024-06-17 DIAGNOSIS — F11.90 CHRONIC, CONTINUOUS USE OF OPIOIDS: ICD-10-CM

## 2024-06-17 DIAGNOSIS — M25.512 CHRONIC LEFT SHOULDER PAIN: ICD-10-CM

## 2024-06-17 DIAGNOSIS — Z12.5 SCREENING FOR PROSTATE CANCER: ICD-10-CM

## 2024-06-17 DIAGNOSIS — Z51.81 MEDICATION MONITORING ENCOUNTER: ICD-10-CM

## 2024-06-17 DIAGNOSIS — M54.59 LUMBAR FACET JOINT PAIN: ICD-10-CM

## 2024-06-17 DIAGNOSIS — M79.672 LEFT FOOT PAIN: ICD-10-CM

## 2024-06-17 DIAGNOSIS — G89.29 CHRONIC PAIN OF RIGHT KNEE: ICD-10-CM

## 2024-06-17 DIAGNOSIS — G89.29 CHRONIC BILATERAL LOW BACK PAIN WITHOUT SCIATICA: ICD-10-CM

## 2024-06-17 DIAGNOSIS — G90.522 COMPLEX REGIONAL PAIN SYNDROME TYPE 1 OF LEFT LOWER EXTREMITY: ICD-10-CM

## 2024-06-17 DIAGNOSIS — R73.01 ELEVATED FASTING GLUCOSE: ICD-10-CM

## 2024-06-17 LAB
ANION GAP SERPL CALCULATED.3IONS-SCNC: 9 MMOL/L (ref 7–15)
BUN SERPL-MCNC: 15.5 MG/DL (ref 8–23)
CALCIUM SERPL-MCNC: 9.6 MG/DL (ref 8.8–10.2)
CHLORIDE SERPL-SCNC: 105 MMOL/L (ref 98–107)
CHOLEST SERPL-MCNC: 108 MG/DL
CREAT SERPL-MCNC: 0.85 MG/DL (ref 0.67–1.17)
CREAT UR-MCNC: 53 MG/DL
DEPRECATED HCO3 PLAS-SCNC: 25 MMOL/L (ref 22–29)
EGFRCR SERPLBLD CKD-EPI 2021: >90 ML/MIN/1.73M2
FASTING STATUS PATIENT QL REPORTED: YES
FASTING STATUS PATIENT QL REPORTED: YES
GLUCOSE SERPL-MCNC: 122 MG/DL (ref 70–99)
HBA1C MFR BLD: 5.9 % (ref 0–5.6)
HDLC SERPL-MCNC: 34 MG/DL
LDLC SERPL CALC-MCNC: 47 MG/DL
NONHDLC SERPL-MCNC: 74 MG/DL
POTASSIUM SERPL-SCNC: 5.1 MMOL/L (ref 3.4–5.3)
PSA SERPL DL<=0.01 NG/ML-MCNC: 2.1 NG/ML (ref 0–4.5)
SODIUM SERPL-SCNC: 139 MMOL/L (ref 135–145)
TRIGL SERPL-MCNC: 136 MG/DL

## 2024-06-17 PROCEDURE — G0481 DRUG TEST DEF 8-14 CLASSES: HCPCS

## 2024-06-17 PROCEDURE — G2211 COMPLEX E/M VISIT ADD ON: HCPCS | Performed by: NURSE PRACTITIONER

## 2024-06-17 PROCEDURE — 80061 LIPID PANEL: CPT

## 2024-06-17 PROCEDURE — 99215 OFFICE O/P EST HI 40 MIN: CPT | Performed by: NURSE PRACTITIONER

## 2024-06-17 PROCEDURE — 80048 BASIC METABOLIC PNL TOTAL CA: CPT

## 2024-06-17 PROCEDURE — 99417 PROLNG OP E/M EACH 15 MIN: CPT | Performed by: NURSE PRACTITIONER

## 2024-06-17 PROCEDURE — G0103 PSA SCREENING: HCPCS

## 2024-06-17 PROCEDURE — 83036 HEMOGLOBIN GLYCOSYLATED A1C: CPT

## 2024-06-17 PROCEDURE — 36415 COLL VENOUS BLD VENIPUNCTURE: CPT

## 2024-06-17 RX ORDER — EZETIMIBE 10 MG/1
TABLET ORAL
COMMUNITY
Start: 2023-10-01 | End: 2024-06-19

## 2024-06-17 SDOH — HEALTH STABILITY: PHYSICAL HEALTH: ON AVERAGE, HOW MANY DAYS PER WEEK DO YOU ENGAGE IN MODERATE TO STRENUOUS EXERCISE (LIKE A BRISK WALK)?: 3 DAYS

## 2024-06-17 SDOH — HEALTH STABILITY: PHYSICAL HEALTH: ON AVERAGE, HOW MANY MINUTES DO YOU ENGAGE IN EXERCISE AT THIS LEVEL?: 40 MIN

## 2024-06-17 ASSESSMENT — PAIN SCALES - GENERAL: PAINLEVEL: SEVERE PAIN (7)

## 2024-06-17 ASSESSMENT — SOCIAL DETERMINANTS OF HEALTH (SDOH): HOW OFTEN DO YOU GET TOGETHER WITH FRIENDS OR RELATIVES?: ONCE A WEEK

## 2024-06-17 NOTE — PATIENT INSTRUCTIONS
PLAN:  Physical Therapy: not at present time, keep up your exercise program at home  Clinical Health Psychologist to address issues of relaxation, behavioral change, coping style, and other factors important to improvement: none  Diagnostic Studies: none  Medication Management:   Certified for medical cannabis  I think that your current use of Norco is appropriate. #30 tabs for a month is not something I would be worried about. Would keep this with your Primary Care Provider.   Further procedures recommended: none at present   Consider lumbar medial branch blocks (tests x 2 proceeding to lumbar radiofrequency ablation (burning procedure). Let me know if and when you would like to proceed. Would need 4 visits of PHYSICAL THERAPY prior to burn  Acupuncture: I am okay with this  Urine toxicology screen today: none, this was done by your Primary Care Provider this morning   Recommendations/follow-up for PCP:  see above  Release of information: none  Follow up: yearly, earlier if needed.    ----------------------------------------------------------------  Clinic Number:  [phone]   Call with any questions about your care and for scheduling assistance.   Calls are returned Monday through Friday between 8 AM and 4:30 PM. We usually get back to you within 2 business days depending on the issue/request.    If we are prescribing your medications:  For opioid medication refills, call the clinic or send a MobiTX message 7 days in advance.  Please include:  Name of requested medication  Name of the pharmacy.  For non-opioid medications, call your pharmacy directly to request a refill. Please allow 3-4 days to be processed.   Per [place] State Law:  All controlled substance prescriptions must be filled within 30 days of being written.    For those controlled substances allowing refills, pickup must occur within 30 days of last fill.      We believe regular attendance is key to your success in our program!    Any time you are  unable to keep your appointment we ask that you call us at least 24 hours in advance to cancel.This will allow us to offer the appointment time to another patient.   Multiple missed appointments may lead to dismissal from the clinic.
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
## 2024-06-19 ENCOUNTER — OFFICE VISIT (OUTPATIENT)
Dept: FAMILY MEDICINE | Facility: CLINIC | Age: 64
End: 2024-06-19
Payer: COMMERCIAL

## 2024-06-19 VITALS
DIASTOLIC BLOOD PRESSURE: 72 MMHG | TEMPERATURE: 96.9 F | RESPIRATION RATE: 16 BRPM | HEART RATE: 64 BPM | SYSTOLIC BLOOD PRESSURE: 122 MMHG | OXYGEN SATURATION: 95 % | BODY MASS INDEX: 30.48 KG/M2 | WEIGHT: 225 LBS | HEIGHT: 72 IN

## 2024-06-19 DIAGNOSIS — F11.90 CHRONIC, CONTINUOUS USE OF OPIOIDS: ICD-10-CM

## 2024-06-19 DIAGNOSIS — F41.8 MIXED ANXIETY DEPRESSIVE DISORDER: Chronic | ICD-10-CM

## 2024-06-19 DIAGNOSIS — G90.522 COMPLEX REGIONAL PAIN SYNDROME TYPE 1 OF LEFT LOWER EXTREMITY: ICD-10-CM

## 2024-06-19 DIAGNOSIS — Z00.00 ENCOUNTER FOR MEDICARE ANNUAL WELLNESS EXAM: Primary | ICD-10-CM

## 2024-06-19 DIAGNOSIS — E78.5 HYPERLIPIDEMIA LDL GOAL <130: ICD-10-CM

## 2024-06-19 DIAGNOSIS — M47.816 SPONDYLOSIS OF LUMBAR REGION WITHOUT MYELOPATHY OR RADICULOPATHY: ICD-10-CM

## 2024-06-19 DIAGNOSIS — Z13.6 SCREENING FOR AAA (ABDOMINAL AORTIC ANEURYSM): ICD-10-CM

## 2024-06-19 PROCEDURE — 99214 OFFICE O/P EST MOD 30 MIN: CPT | Mod: 25 | Performed by: FAMILY MEDICINE

## 2024-06-19 PROCEDURE — G0439 PPPS, SUBSEQ VISIT: HCPCS | Performed by: FAMILY MEDICINE

## 2024-06-19 RX ORDER — LORAZEPAM 1 MG/1
TABLET ORAL
Qty: 15 TABLET | Refills: 0 | Status: SHIPPED | OUTPATIENT
Start: 2024-06-19 | End: 2024-08-27

## 2024-06-19 RX ORDER — HYDROCODONE BITARTRATE AND ACETAMINOPHEN 5; 325 MG/1; MG/1
1 TABLET ORAL EVERY 6 HOURS PRN
Qty: 30 TABLET | Refills: 0 | Status: SHIPPED | OUTPATIENT
Start: 2024-06-19 | End: 2024-08-14

## 2024-06-19 RX ORDER — EZETIMIBE 10 MG/1
10 TABLET ORAL DAILY
Qty: 90 TABLET | Refills: 4 | Status: SHIPPED | OUTPATIENT
Start: 2024-06-19

## 2024-06-19 RX ORDER — HYDROCODONE BITARTRATE AND ACETAMINOPHEN 5; 325 MG/1; MG/1
1 TABLET ORAL EVERY 6 HOURS PRN
Qty: 30 TABLET | Refills: 0 | Status: SHIPPED | OUTPATIENT
Start: 2024-06-19 | End: 2024-06-19

## 2024-06-19 RX ORDER — RESPIRATORY SYNCYTIAL VIRUS VACCINE 120MCG/0.5
0.5 KIT INTRAMUSCULAR ONCE
Qty: 1 EACH | Refills: 0 | Status: CANCELLED | OUTPATIENT
Start: 2024-06-19 | End: 2024-06-19

## 2024-06-19 RX ORDER — HYDROCODONE BITARTRATE AND ACETAMINOPHEN 5; 325 MG/1; MG/1
1 TABLET ORAL EVERY 6 HOURS PRN
Qty: 30 TABLET | Refills: 0 | Status: SHIPPED | OUTPATIENT
Start: 2024-07-17

## 2024-06-19 ASSESSMENT — PAIN SCALES - GENERAL: PAINLEVEL: NO PAIN (0)

## 2024-06-19 NOTE — TELEPHONE ENCOUNTER
Patient called. Patient completed form and lab. Dr. Sears refilled further scripts.    Closing encounter.    Narinder DEAN RN  [facility]

## 2024-06-19 NOTE — NURSING NOTE
"Initial /72   Pulse 64   Temp 96.9  F (36.1  C) (Tympanic)   Resp 16   Ht 1.822 m (5' 11.75\")   Wt 102.1 kg (225 lb)   SpO2 95%   BMI 30.73 kg/m   Estimated body mass index is 30.73 kg/m  as calculated from the following:    Height as of this encounter: 1.822 m (5' 11.75\").    Weight as of this encounter: 102.1 kg (225 lb). .    "

## 2024-06-19 NOTE — PATIENT INSTRUCTIONS
"Patient Education   Preventive Care Advice   This is general advice we often give to help people stay healthy. Your care team may have specific advice just for you. Please talk to your care team about your own preventive care needs.  Lifestyle  Exercise at least 150 minutes each week (30 minutes a day, 5 days a week).  Do muscle strengthening activities 2 days a week. These help control your weight and prevent disease.  No smoking.  Wear sunscreen to prevent skin cancer.  Have your home tested for radon every 2 to 5 years. Radon is a colorless, odorless gas that can harm your lungs. To learn more, go to www.health.[facility].mn.us and search for \"Radon in Homes.\"  Keep guns unloaded and locked up in a safe place like a safe or gun vault, or, use a gun lock and hide the keys. Always lock away bullets separately. To learn more, visit [employer].[place].gov and search for \"safe gun storage.\"  Nutrition  Eat 5 or more servings of fruits and vegetables each day.  Try wheat bread, brown rice and whole grain pasta (instead of white bread, rice, and pasta).  Get enough calcium and vitamin D. Check the label on foods and aim for 100% of the RDA (recommended daily allowance).  Regular exams  Have a dental exam and cleaning every 6 months.  See your health care team every year to talk about:  Any changes in your health.  Any medicines your care team has prescribed.  Preventive care, family planning, and ways to prevent chronic diseases.  Shots (vaccines)   HPV shots (up to age 26), if you've never had them before.  Hepatitis B shots (up to age 59), if you've never had them before.  COVID-19 shot: Get this shot when it's due.  Flu shot: Get a flu shot every year.  Tetanus shot: Get a tetanus shot every 10 years.  Pneumococcal, hepatitis A, and RSV shots: Ask your care team if you need these based on your risk.  Shingles shot (for age 50 and up).  General health tests  Diabetes screening:  Starting at age 35, Get screened for diabetes at least " every 3 years.  If you are younger than age 35, ask your care team if you should be screened for diabetes.  Cholesterol test: At age 39, start having a cholesterol test every 5 years, or more often if advised.  Bone density scan (DEXA): At age 50, ask your care team if you should have this scan for osteoporosis (brittle bones).  Hepatitis C: Get tested at least once in your life.  Abdominal aortic aneurysm screening: Talk to your doctor about having this screening if you:  Have ever smoked; and  Are biologically male; and  Are between the ages of 65 and 75.  STIs (sexually transmitted infections)  Before age 24: Ask your care team if you should be screened for STIs.  After age 24: Get screened for STIs if you're at risk. You are at risk for STIs (including HIV) if:  You are sexually active with more than one person.  You don't use condoms every time.  You or a partner was diagnosed with a sexually transmitted infection.  If you are at risk for HIV, ask about PrEP medicine to prevent HIV.  Get tested for HIV at least once in your life, whether you are at risk for HIV or not.  Cancer screening tests  Cervical cancer screening: If you have a cervix, begin getting regular cervical cancer screening tests at age 21. Most people who have regular screenings with normal results can stop after age 65. Talk about this with your provider.  Breast cancer scan (mammogram): If you've ever had breasts, begin having regular mammograms starting at age 40. This is a scan to check for breast cancer.  Colon cancer screening: It is important to start screening for colon cancer at age 45.  Have a colonoscopy test every 10 years (or more often if you're at risk) Or, ask your provider about stool tests like a FIT test every year or Cologuard test every 3 years.  To learn more about your testing options, visit: www.[employer]/284819.pdf.  For help making a decision, visit: fady/wv06622.  Prostate cancer screening test: If you have a  prostate and are age 55 to 69, ask your provider if you would benefit from a yearly prostate cancer screening test.  Lung cancer screening: If you are a current or former smoker age 50 to 80, ask your care team if ongoing lung cancer screenings are right for you.  For informational purposes only. Not to replace the advice of your health care provider. Copyright   2023 [facility]. All rights reserved. Clinically reviewed by the  [employer]. [employer] 479864 - REV 04/24.  Preventing Falls: Care Instructions  Injuries and health problems such as trouble walking or poor eyesight can increase your risk of falling. So can some medicines. But there are things you can do to help prevent falls. You can exercise to get stronger. You can also arrange your home to make it safer.    Talk to your doctor about the medicines you take. Ask if any of them increase the risk of falls and whether they can be changed or stopped.   Try to exercise regularly. It can help improve your strength and balance. This can help lower your risk of falling.     Practice fall safety and prevention.    Wear low-heeled shoes that fit well and give your feet good support. Talk to your doctor if you have foot problems that make this hard.  Carry a cellphone or wear a medical alert device that you can use to call for help.  Use stepladders instead of chairs to reach high objects. Don't climb if you're at risk for falls. Ask for help, if needed.  Wear the correct eyeglasses, if you need them.    Make your home safer.    Remove rugs, cords, clutter, and furniture from walkways.  Keep your house well lit. Use night-lights in hallways and bathrooms.  Install and use sturdy handrails on stairways.  Wear nonskid footwear, even inside. Don't walk barefoot or in socks without shoes.    Be safe outside.    Use handrails, curb cuts, and ramps whenever possible.  Keep your hands free by using a shoulder bag or backpack.  Try  "to walk in well-lit areas. Watch out for uneven ground, changes in pavement, and debris.  Be careful in the winter. Walk on the grass or gravel when sidewalks are slippery. Use de-icer on steps and walkways. Add non-slip devices to shoes.    Put grab bars and nonskid mats in your shower or tub and near the toilet. Try to use a shower chair or bath bench when bathing.   Get into a tub or shower by putting in your weaker leg first. Get out with your strong side first. Have a phone or medical alert device in the bathroom with you.   Where can you learn more?  Go to https://www.[employer].net/patiented  Enter G117 in the search box to learn more about \"Preventing Falls: Care Instructions.\"  Current as of: July 17, 2023               Content Version: 14.0    6703-8294 [employer].   Care instructions adapted under license by your healthcare professional. If you have questions about a medical condition or this instruction, always ask your healthcare professional. [employer] disclaims any warranty or liability for your use of this information.      Hearing Loss: Care Instructions  Overview     Hearing loss is a sudden or slow decrease in how well you hear. It can range from slight to profound. Permanent hearing loss can occur with aging. It also can happen when you are exposed long-term to loud noise. Examples include listening to loud music, riding motorcycles, or being around other loud machines.  Hearing loss can affect your work and home life. It can make you feel lonely or depressed. You may feel that you have lost your independence. But hearing aids and other devices can help you hear better and feel connected to others.  Follow-up care is a key part of your treatment and safety. Be sure to make and go to all appointments, and call your doctor if you are having problems. It's also a good idea to know your test results and keep a list of the medicines you take.  How can you care for yourself " at home?  Avoid loud noises whenever possible. This helps keep your hearing from getting worse.  Always wear hearing protection around loud noises.  Wear a hearing aid as directed.  A professional can help you pick a hearing aid that will work best for you.  You can also get hearing aids over the counter for mild to moderate hearing loss.  Have hearing tests as your doctor suggests. They can show whether your hearing has changed. Your hearing aid may need to be adjusted.  Use other devices as needed. These may include:  Telephone amplifiers and hearing aids that can connect to a television, stereo, radio, or microphone.  Devices that use lights or vibrations. These alert you to the doorbell, a ringing telephone, or a baby monitor.  Television closed-captioning. This shows the words at the bottom of the screen. Most new TVs can do this.  TTY (text telephone). This lets you type messages back and forth on the telephone instead of talking or listening. These devices are also called TDD. When messages are typed on the keyboard, they are sent over the phone line to a receiving TTY. The message is shown on a monitor.  Use text messaging, social media, and email if it is hard for you to communicate by telephone.  Try to learn a listening technique called speechreading. It is not lipreading. You pay attention to people's gestures, expressions, posture, and tone of voice. These clues can help you understand what a person is saying. Face the person you are talking to, and have them face you. Make sure the lighting is good. You need to see the other person's face clearly.  Think about counseling if you need help to adjust to your hearing loss.  When should you call for help?  Watch closely for changes in your health, and be sure to contact your doctor if:    You think your hearing is getting worse.     You have new symptoms, such as dizziness or nausea.   Where can you learn more?  Go to  "https://www.[employer].net/patiented  Enter R798 in the search box to learn more about \"Hearing Loss: Care Instructions.\"  Current as of: September 27, 2023               Content Version: 14.0    6389-3636 [employer].   Care instructions adapted under license by your healthcare professional. If you have questions about a medical condition or this instruction, always ask your healthcare professional. [employer] disclaims any warranty or liability for your use of this information.      Substance Use Disorder: Care Instructions  Overview     You can improve your life and health by stopping your use of alcohol or drugs. When you don't drink or use drugs, you may feel and sleep better. You may get along better with your family, friends, and coworkers. There are medicines and programs that can help with substance use disorder.  How can you care for yourself at home?  Here are some ways to help you stay sober and prevent relapse.  If you have been given medicine to help keep you sober or reduce your cravings, be sure to take it exactly as prescribed.  Talk to your doctor about programs that can help you stop using drugs or drinking alcohol.  Do not keep alcohol or drugs in your home.  Plan ahead. Think about what you'll say if other people ask you to drink or use drugs. Try not to spend time with people who drink or use drugs.  Use the time and money spent on drinking or drugs to do something that's important to you.  Preventing a relapse  Have a plan to deal with relapse. Learn to recognize changes in your thinking that lead you to drink or use drugs. Get help before you start to drink or use drugs again.  Try to stay away from situations, friends, or places that may lead you to drink or use drugs.  If you feel the need to drink alcohol or use drugs again, seek help right away. Call a trusted friend or family member. Some people get support from organizations such as Narcotics Anonymous or " [facility] or from treatment facilities.  If you relapse, get help as soon as you can. Some people make a plan with another person that outlines what they want that person to do for them if they relapse. The plan usually includes how to handle the relapse and who to notify in case of relapse.  Don't give up. Remember that a relapse doesn't mean that you have failed. Use the experience to learn the triggers that lead you to drink or use drugs. Then quit again. Recovery is a lifelong process. Many people have several relapses before they are able to quit for good.  Follow-up care is a key part of your treatment and safety. Be sure to make and go to all appointments, and call your doctor if you are having problems. It's also a good idea to know your test results and keep a list of the medicines you take.  When should you call for help?   Call 911  anytime you think you may need emergency care. For example, call if you or someone else:    Has overdosed or has withdrawal signs. Be sure to tell the emergency workers that you are or someone else is using or trying to quit using drugs. Overdose or withdrawal signs may include:  Losing consciousness.  Seizure.  Seeing or hearing things that aren't there (hallucinations).     Is thinking or talking about suicide or harming others.   Where to get help 24 hours a day, 7 days a week   If you or someone you know talks about suicide, self-harm, a mental health crisis, a substance use crisis, or any other kind of emotional distress, get help right away. You can:    Call the Suicide and Crisis Lifeline at 988.     Call 8-760-118-VKFO (1-917.439.3610).     Text HOME to 716444 to access the Crisis Text Line.   Consider saving these numbers in your phone.  Go to "[employer] for more information or to chat online.  Call your doctor now or seek immediate medical care if:    You are having withdrawal symptoms. These may include nausea or vomiting, sweating, shakiness, and anxiety.  "  Watch closely for changes in your health, and be sure to contact your doctor if:    You have a relapse.     You need more help or support to stop.   Where can you learn more?  Go to https://www.[employer].net/patiented  Enter H573 in the search box to learn more about \"Substance Use Disorder: Care Instructions.\"  Current as of: November 15, 2023               Content Version: 14.0    9378-5152 [employer].   Care instructions adapted under license by your healthcare professional. If you have questions about a medical condition or this instruction, always ask your healthcare professional. [employer] disclaims any warranty or liability for your use of this information.      Chronic Pain: Care Instructions  Your Care Instructions     Chronic pain is pain that lasts a long time (months or even years) and may or may not have a clear cause. It is different from acute pain, which usually does have a clear cause--like an injury or illness--and gets better over time. Chronic pain:  Lasts over time but may vary from day to day.  Does not go away despite efforts to end it.  May disrupt your sleep and lead to fatigue.  May cause depression or anxiety.  May make your muscles tense, causing more pain.  Can disrupt your work, hobbies, home life, and relationships with friends and family.  Chronic pain is a very real condition. It is not just in your head. Treatment can help and usually includes several methods used together, such as medicines, physical therapy, exercise, and other treatments. Learning how to relax and changing negative thought patterns can also help you cope.  Chronic pain is complex. Taking an active role in your treatment will help you better manage your pain. Tell your doctor if you have trouble dealing with your pain. You may have to try several things before you find what works best for you.  Follow-up care is a key part of your treatment and safety. Be sure to make and go to all " appointments, and call your doctor if you are having problems. It's also a good idea to know your test results and keep a list of the medicines you take.  How can you care for yourself at home?  Pace yourself. Break up large jobs into smaller tasks. Save harder tasks for days when you have less pain, or go back and forth between hard tasks and easier ones. Take rest breaks.  Relax, and reduce stress. Relaxation techniques such as deep breathing or meditation can help.  Keep moving. Gentle, daily exercise can help reduce pain over the long run. Try low- or no-impact exercises such as walking, swimming, and stationary biking. Do stretches to stay flexible.  Try heat, cold packs, and massage.  Get enough sleep. Chronic pain can make you tired and drain your energy. Talk with your doctor if you have trouble sleeping because of pain.  Think positive. Your thoughts can affect your pain level. Do things that you enjoy to distract yourself when you have pain instead of focusing on the pain. See a movie, read a book, listen to music, or spend time with a friend.  If you think you are depressed, talk to your doctor about treatment.  Keep a daily pain diary. Record how your moods, thoughts, sleep patterns, activities, and medicine affect your pain. You may find that your pain is worse during or after certain activities or when you are feeling a certain emotion. Having a record of your pain can help you and your doctor find the best ways to treat your pain.  Take pain medicines exactly as directed.  If the doctor gave you a prescription medicine for pain, take it as prescribed.  If you are not taking a prescription pain medicine, ask your doctor if you can take an over-the-counter medicine.  Reducing constipation caused by pain medicine  Talk to your doctor about a laxative. If a laxative doesn't work, your doctor may suggest a prescription medicine.  Include fruits, vegetables, beans, and whole grains in your diet each day.  "These foods are high in fiber.  If your doctor recommends it, get more exercise. Walking is a good choice. Bit by bit, increase the amount you walk every day. Try for at least 30 minutes on most days of the week.  Schedule time each day for a bowel movement. A daily routine may help. Take your time and do not strain when having a bowel movement.  When should you call for help?   Call your doctor now or seek immediate medical care if:    Your pain gets worse or is out of control.     You feel down or blue, or you do not enjoy things like you once did. You may be depressed, which is common in people with chronic pain. Depression can be treated.     You have vomiting or cramps for more than 2 hours.   Watch closely for changes in your health, and be sure to contact your doctor if:    You cannot sleep because of pain.     You are very worried or anxious about your pain.     You have trouble taking your pain medicine.     You have any concerns about your pain medicine.     You have trouble with bowel movements, such as:  No bowel movement in 3 days.  Blood in the anal area, in your stool, or on the toilet paper.  Diarrhea for more than 24 hours.   Where can you learn more?  Go to https://www.[employer]r.net/patiented  Enter N004 in the search box to learn more about \"Chronic Pain: Care Instructions.\"  Current as of: July 10, 2023               Content Version: 14.0    9912-5785 [employer].   Care instructions adapted under license by your healthcare professional. If you have questions about a medical condition or this instruction, always ask your healthcare professional. [employer] disclaims any warranty or liability for your use of this information.         "

## 2024-06-19 NOTE — LETTER
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
## 2024-06-19 NOTE — LETTER

## 2024-06-21 LAB — LORAZEPAM UR QL CFM: PRESENT

## 2024-06-24 NOTE — RESULT ENCOUNTER NOTE
Mata,    These labs are normal or acceptable.    Please contact my office if you have questions.    Lauren Frederick M.D.  (I am covering for Dr. Sears who is away from the office today.)

## 2024-07-15 DIAGNOSIS — M79.18 LUMBAR MUSCLE PAIN: ICD-10-CM

## 2024-07-15 RX ORDER — METHOCARBAMOL 750 MG/1
750-1500 TABLET, FILM COATED ORAL 4 TIMES DAILY PRN
Qty: 120 TABLET | Refills: 5 | Status: SHIPPED | OUTPATIENT
Start: 2024-07-15

## 2024-07-15 NOTE — TELEPHONE ENCOUNTER
Received fax request from [email or web address] pharmacy requesting refill(s) for methocarbamol (ROBAXIN) 750 MG tablet     Last refilled on 07/11/24    Pt last seen on 06/17/24  Next appt scheduled for : none    Will facilitate refill.

## 2024-07-15 NOTE — TELEPHONE ENCOUNTER
Signed Prescriptions:                        Disp   Refills    methocarbamol (ROBAXIN) 750 MG tablet      120 ta*5        Sig: Take 1-2 tablets (750-1,500 mg) by mouth 4 times           daily as needed for muscle spasms For muscle           spasms.  Authorizing Provider: MARYCRUZ MCDERMOTT, RN CNP, FNP  [facility]  [facility] [place]

## 2024-07-17 ENCOUNTER — HOSPITAL ENCOUNTER (OUTPATIENT)
Dept: ULTRASOUND IMAGING | Facility: CLINIC | Age: 64
Discharge: HOME | End: 2024-07-17
Attending: FAMILY MEDICINE | Admitting: FAMILY MEDICINE
Payer: COMMERCIAL

## 2024-07-17 DIAGNOSIS — Z13.6 SCREENING FOR AAA (ABDOMINAL AORTIC ANEURYSM): ICD-10-CM

## 2024-07-17 PROCEDURE — 76706 US ABDL AORTA SCREEN AAA: CPT

## 2024-08-14 ENCOUNTER — MYC REFILL (OUTPATIENT)
Dept: FAMILY MEDICINE | Facility: CLINIC | Age: 64
End: 2024-08-14

## 2024-08-14 ENCOUNTER — OFFICE VISIT (OUTPATIENT)
Dept: DERMATOLOGY | Facility: CLINIC | Age: 64
End: 2024-08-14
Payer: COMMERCIAL

## 2024-08-14 ENCOUNTER — OFFICE VISIT (OUTPATIENT)
Dept: SURGERY | Facility: CLINIC | Age: 64
End: 2024-08-14
Payer: COMMERCIAL

## 2024-08-14 VITALS
OXYGEN SATURATION: 96 % | HEART RATE: 58 BPM | WEIGHT: 225 LBS | TEMPERATURE: 97.1 F | SYSTOLIC BLOOD PRESSURE: 138 MMHG | BODY MASS INDEX: 30.48 KG/M2 | DIASTOLIC BLOOD PRESSURE: 81 MMHG | HEIGHT: 72 IN

## 2024-08-14 DIAGNOSIS — M47.816 SPONDYLOSIS OF LUMBAR REGION WITHOUT MYELOPATHY OR RADICULOPATHY: ICD-10-CM

## 2024-08-14 DIAGNOSIS — F11.90 CHRONIC, CONTINUOUS USE OF OPIOIDS: ICD-10-CM

## 2024-08-14 DIAGNOSIS — G90.522 COMPLEX REGIONAL PAIN SYNDROME TYPE 1 OF LEFT LOWER EXTREMITY: ICD-10-CM

## 2024-08-14 DIAGNOSIS — H57.11 ACUTE RIGHT EYE PAIN: Primary | ICD-10-CM

## 2024-08-14 DIAGNOSIS — L72.0 EPIDERMAL CYST: Primary | ICD-10-CM

## 2024-08-14 PROCEDURE — 99212 OFFICE O/P EST SF 10 MIN: CPT | Performed by: DERMATOLOGY

## 2024-08-14 ASSESSMENT — PAIN SCALES - GENERAL
PAINLEVEL: MODERATE PAIN (5)
PAINLEVEL: NO PAIN (0)
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
## 2024-08-14 NOTE — NURSING NOTE
Mata Poe's chief complaint for this visit includes:  Chief Complaint   Patient presents with    Derm Problem     Cyst near eye     PCP: Parrish Sears    Referring Provider:  Referred Self, MD  No address on file    There were no vitals taken for this visit.  No Pain (0)        Allergies   Allergen Reactions    Lyrica [Pregabalin]      Felt really edgy and anxious on Lyrica 25mg dosing         Do you need any medication refills at today's visit? No    Tamika Dominique MA

## 2024-08-14 NOTE — PROGRESS NOTES
No charge visit.   Area of concern is just inferior to the eye   This looks like it needs to drain.   I recommend an eye doctor rather than a general surgeon.   Pt stated the dermatologist at [place] drained a similar one last year.    Thus, I'll have the RNs look into having him see dermatology and/or opthalmology  sooner rather than later.     [facility] Wade, DO, JENNIFER

## 2024-08-14 NOTE — LETTER
8/14/2024      Mata Poe  [address]  [facility] [address]      Dear Colleague,    Thank you for referring your patient, Mata Poe, to the [facility]. Please see a copy of my visit note below.    No charge visit.   Area of concern is just inferior to the eye   This looks like it needs to drain.   I recommend an eye doctor rather than a general surgeon.   Pt stated the dermatologist at [place] drained a similar one last year.    Thus, I'll have the RNs look into having him see dermatology and/or opthalmology  sooner rather than later.     [facility] DO Sheri, JENNIFER      Again, thank you for allowing me to participate in the care of your patient.        Sincerely,        [facility] Sheri

## 2024-08-15 RX ORDER — HYDROCODONE BITARTRATE AND ACETAMINOPHEN 5; 325 MG/1; MG/1
1 TABLET ORAL EVERY 6 HOURS PRN
Qty: 30 TABLET | Refills: 0 | Status: SHIPPED | OUTPATIENT
Start: 2024-08-15 | End: 2024-09-12

## 2024-08-15 NOTE — TELEPHONE ENCOUNTER
Pending Prescriptions:                       Disp   Refills    HYDROcodone-acetaminophen (NORCO) 5-325 M*30 tab*0            Sig: Take 1 tablet by mouth every 6 hours as needed           for severe pain Max of 2 tabs per day. Use           sparingly.    Routing refill request to provider for review/approval because:  Drug not on the [facility] refill protocol       Jero Li RN

## 2024-08-27 ENCOUNTER — MYC REFILL (OUTPATIENT)
Dept: FAMILY MEDICINE | Facility: CLINIC | Age: 64
End: 2024-08-27
Payer: COMMERCIAL

## 2024-08-27 DIAGNOSIS — F41.8 MIXED ANXIETY DEPRESSIVE DISORDER: Chronic | ICD-10-CM

## 2024-08-29 RX ORDER — LORAZEPAM 1 MG/1
TABLET ORAL
Qty: 15 TABLET | Refills: 0 | Status: SHIPPED | OUTPATIENT
Start: 2024-08-29

## 2024-09-05 ENCOUNTER — E-VISIT (OUTPATIENT)
Dept: URGENT CARE | Facility: CLINIC | Age: 64
End: 2024-09-05
Payer: COMMERCIAL

## 2024-09-05 DIAGNOSIS — J01.90 ACUTE SINUSITIS WITH SYMPTOMS > 10 DAYS: Primary | ICD-10-CM

## 2024-09-05 PROCEDURE — 99421 OL DIG E/M SVC 5-10 MIN: CPT | Performed by: PHYSICIAN ASSISTANT

## 2024-09-05 NOTE — PATIENT INSTRUCTIONS
Dear Mata Poe    After reviewing your responses, I've been able to diagnose you with Acute sinusitis with symptoms > 10 days.      Based on your responses and diagnosis, I have prescribed   Orders Placed This Encounter   Medications     amoxicillin-clavulanate (AUGMENTIN) 875-125 MG tablet     Sig: Take 1 tablet by mouth 2 times daily for 7 days.     Dispense:  14 tablet     Refill:  0      to treat your symptoms. I have sent this to your pharmacy.?     It is also important to stay well hydrated, get lots of rest and take over-the-counter decongestants,?tylenol?or ibuprofen if you?are able to?take those medications per your primary care provider to help relieve discomfort.?     It is important that you take?all of?your prescribed medication even if your symptoms are improving after a few doses.? Taking?all of?your medicine helps prevent the symptoms from returning.?     If your symptoms worsen, you develop severe headache, vomiting, high fever (>102), or are not improving in 5 days, please contact your primary care provider for an appointment or visit any of our convenient Walk-in Care or Urgent Care Centers to be seen which can be found on our website?here.?     Thanks again for choosing?us?as your health care partner,?   ?  Delfina Palma PA-C?

## 2024-09-10 DIAGNOSIS — E78.5 HYPERLIPIDEMIA LDL GOAL <130: Chronic | ICD-10-CM

## 2024-09-11 ENCOUNTER — OFFICE VISIT (OUTPATIENT)
Dept: DERMATOLOGY | Facility: CLINIC | Age: 64
End: 2024-09-11
Payer: COMMERCIAL

## 2024-09-11 DIAGNOSIS — L72.0 EPIDERMAL CYST: Primary | ICD-10-CM

## 2024-09-11 PROCEDURE — 11441 EXC FACE-MM B9+MARG 0.6-1 CM: CPT | Performed by: DERMATOLOGY

## 2024-09-11 PROCEDURE — 12051 INTMD RPR FACE/MM 2.5 CM/<: CPT | Performed by: DERMATOLOGY

## 2024-09-11 PROCEDURE — 88331 PATH CONSLTJ SURG 1 BLK 1SPC: CPT | Performed by: DERMATOLOGY

## 2024-09-11 RX ORDER — ATORVASTATIN CALCIUM 80 MG/1
80 TABLET, FILM COATED ORAL DAILY
Qty: 100 TABLET | Refills: 0 | Status: SHIPPED | OUTPATIENT
Start: 2024-09-11
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
## 2024-09-11 NOTE — PATIENT INSTRUCTIONS
Sutured Wound Care     Right lower eyelid    [facility]: [phone]    [facility]: [phone]          No strenuous activity for 48 hours. Resume moderate activity in 48 hours. No heavy exercising until you are seen for follow up in one week.     Take Tylenol as needed for discomfort.                         Do not drink alcoholic beverages for 48 hours.     Keep the pressure bandage in place for 24 hours. If the bandage becomes blood tinged or loose, reinforce it with gauze and tape.        (Refer to the reverse side of this page for management of bleeding).    Remove pressure bandage in 24 hours (White)    Leave the flat bandage in place for one week. (Brown)    Keep the bandage dry. Wash around it carefully.    If the tape becomes soiled or starts to come off, reinforce it with additional paper tape.    Do not smoke for 3 weeks; smoking is detrimental to wound healing.    It is normal to have swelling and bruising around the surgical site. The bruising will fade in approximately 10-14 days. Elevate the area to reduce swelling.    Numbness, itchiness and sensitivity to temperature changes can occur after surgery and may take up to 18 months to normalize.      POSSIBLE COMPLICATIONS    BLEEDING:    Leave the bandage in place.  Use tightly rolled up gauze or a cloth to apply direct pressure over the bandage for 20   minutes.  Reapply pressure for an additional 20 minutes if necessary  Call the office or go to the nearest emergency room if pressure fails to stop the bleeding.  Use additional gauze and tape to maintain pressure once the bleeding has stopped.        PAIN:    Post operative pain should slowly get better, never worse.  A severe increase in pain may indicate a problem. Call the office if this occurs.      Bandage change instructions for 24     In one week:  -Remove all bandages on this day : 24   -Clean area with water  -Pat dry  -Apply steri strips to the sutures  -Apply  piece of paper tape over steri strips  -Keep bandages on for one more week.  Keep dry.      FOLLOW INSTRUCTIONS BELOW        WOUND CARE INSTRUCTIONS  for  ONE WEEK AFTER SURGERY   9/18/24           Leave flat bandage on your skin for one week 9/18/24  after today s bandage change.  In one week 9/25/24 when you remove the bandage, you may resume your regular skin care routine, including washing with mild soap and water, applying moisturizer, make-up and sunscreen.    If there are any open or bleeding areas at the incision/graft site you should begin to cover the area with a bandage daily as follows:    Clean and dry the area with plain tap water using a Q-tip or sterile gauze pad.  Apply Polysporin or Bacitracin ointment to the open area.  Cover the wound with a band-aid or a sterile non-stick gauze pad and micropore paper tape.         SIGNS OF INFECTION  - If you notice any of these signs of infection, call your doctor right away: expanding redness around the wound.  - Yellow or greenish-colored pus or cloudy wound drainage.    - Red streaking spreading from the wound.  - Increased swelling, tenderness, or pain around the wound.   - Fever.    Please remember that yellow and clear drainage from a wound can be normal and related to normal wound healing.  Isolated drainage from a wound without a combination of the above features does not indicate infection.       *Once the bandages are removed, the scar will be red and firm (especially in the lip/chin area). This is normal and will fade in time. It might take 6-12 months for this to happen.     *Massaging the area will help the scar soften and fade quicker. Begin to massage the area one month after the bandages have been removed. To massage apply pressure directly and firmly over the scar with the fingertips and move in a circular motion. Massage the area for a few minutes several times a day. Continue to massage the site for several months.    *Approximately 6-8  weeks after surgery it is not uncommon to see the formation of  tender pimple-like  bump along the scar. This is normal. As the scar continues to mature and the stitches underneath the skin begin to dissolve, this might occur. Do not pick or squeeze, this will resolve on it s own. Should one break open producing a small amount of drainage, apply Polysporin or Bacitracin ointment a few times a day until the wound is completely healed.    *Numbness in the surgical area is expected. It might take 12-18 months for the feeling to return to normal. During this time sensations of itchiness, tingling and occasional sharp pains might be noted. These feelings are normal and will subside once the nerves have completely healed.         IN CASE OF EMERGENCY: Dr Liz [phone]     If you were seen in [place] call: [phone]    If you were seen in [place] call: [phone]
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
## 2024-09-12 ENCOUNTER — MYC REFILL (OUTPATIENT)
Dept: FAMILY MEDICINE | Facility: CLINIC | Age: 64
End: 2024-09-12
Payer: COMMERCIAL

## 2024-09-12 DIAGNOSIS — G90.522 COMPLEX REGIONAL PAIN SYNDROME TYPE 1 OF LEFT LOWER EXTREMITY: ICD-10-CM

## 2024-09-12 DIAGNOSIS — F11.90 CHRONIC, CONTINUOUS USE OF OPIOIDS: ICD-10-CM

## 2024-09-12 DIAGNOSIS — M47.816 SPONDYLOSIS OF LUMBAR REGION WITHOUT MYELOPATHY OR RADICULOPATHY: ICD-10-CM

## 2024-09-13 ENCOUNTER — MYC REFILL (OUTPATIENT)
Dept: FAMILY MEDICINE | Facility: CLINIC | Age: 64
End: 2024-09-13
Payer: COMMERCIAL

## 2024-09-13 DIAGNOSIS — F11.90 CHRONIC, CONTINUOUS USE OF OPIOIDS: ICD-10-CM

## 2024-09-13 DIAGNOSIS — M47.816 SPONDYLOSIS OF LUMBAR REGION WITHOUT MYELOPATHY OR RADICULOPATHY: ICD-10-CM

## 2024-09-13 DIAGNOSIS — G90.522 COMPLEX REGIONAL PAIN SYNDROME TYPE 1 OF LEFT LOWER EXTREMITY: ICD-10-CM

## 2024-09-13 RX ORDER — HYDROCODONE BITARTRATE AND ACETAMINOPHEN 5; 325 MG/1; MG/1
1 TABLET ORAL EVERY 6 HOURS PRN
Qty: 30 TABLET | Refills: 0 | Status: CANCELLED | OUTPATIENT
Start: 2024-09-13

## 2024-09-13 RX ORDER — HYDROCODONE BITARTRATE AND ACETAMINOPHEN 5; 325 MG/1; MG/1
1 TABLET ORAL EVERY 6 HOURS PRN
Qty: 30 TABLET | Refills: 0 | Status: SHIPPED | OUTPATIENT
Start: 2024-09-13

## 2024-09-13 NOTE — TELEPHONE ENCOUNTER
If 30 no longer lasting the whole month then I would recommend follow-up with pain clinic to discuss adjustments to pain management plan.  The risk with chronic narcotic use is that you can develop a tolerance requiring higher doses.  Simply increasing the dose can sometimes just accelerate that process.  I would recommend discussing options with the pain clinic.

## 2024-11-15 ENCOUNTER — MYC REFILL (OUTPATIENT)
Dept: FAMILY MEDICINE | Facility: CLINIC | Age: 64
End: 2024-11-15
Payer: COMMERCIAL

## 2024-11-15 DIAGNOSIS — M47.816 SPONDYLOSIS OF LUMBAR REGION WITHOUT MYELOPATHY OR RADICULOPATHY: ICD-10-CM

## 2024-11-15 DIAGNOSIS — G90.522 COMPLEX REGIONAL PAIN SYNDROME TYPE 1 OF LEFT LOWER EXTREMITY: ICD-10-CM

## 2024-11-15 DIAGNOSIS — F11.90 CHRONIC, CONTINUOUS USE OF OPIOIDS: ICD-10-CM

## 2024-11-19 RX ORDER — HYDROCODONE BITARTRATE AND ACETAMINOPHEN 5; 325 MG/1; MG/1
1 TABLET ORAL EVERY 6 HOURS PRN
Qty: 30 TABLET | Refills: 0 | Status: SHIPPED | OUTPATIENT
Start: 2024-11-19

## 2025-02-04 ENCOUNTER — MYC REFILL (OUTPATIENT)
Dept: FAMILY MEDICINE | Facility: CLINIC | Age: 65
End: 2025-02-04
Payer: COMMERCIAL

## 2025-02-04 DIAGNOSIS — F41.8 MIXED ANXIETY DEPRESSIVE DISORDER: Chronic | ICD-10-CM

## 2025-02-06 RX ORDER — LORAZEPAM 1 MG/1
TABLET ORAL
Qty: 15 TABLET | Refills: 0 | Status: SHIPPED | OUTPATIENT
Start: 2025-02-06

## 2025-02-19 ENCOUNTER — MYC REFILL (OUTPATIENT)
Dept: FAMILY MEDICINE | Facility: CLINIC | Age: 65
End: 2025-02-19
Payer: COMMERCIAL

## 2025-02-19 DIAGNOSIS — F11.90 CHRONIC, CONTINUOUS USE OF OPIOIDS: ICD-10-CM

## 2025-02-19 DIAGNOSIS — M47.816 SPONDYLOSIS OF LUMBAR REGION WITHOUT MYELOPATHY OR RADICULOPATHY: ICD-10-CM

## 2025-02-19 DIAGNOSIS — G90.522 COMPLEX REGIONAL PAIN SYNDROME TYPE 1 OF LEFT LOWER EXTREMITY: ICD-10-CM

## 2025-02-20 RX ORDER — HYDROCODONE BITARTRATE AND ACETAMINOPHEN 5; 325 MG/1; MG/1
1 TABLET ORAL EVERY 6 HOURS PRN
Qty: 30 TABLET | Refills: 0 | Status: SHIPPED | OUTPATIENT
Start: 2025-02-20

## 2025-03-19 ENCOUNTER — MYC REFILL (OUTPATIENT)
Dept: FAMILY MEDICINE | Facility: CLINIC | Age: 65
End: 2025-03-19
Payer: COMMERCIAL

## 2025-03-19 DIAGNOSIS — F11.90 CHRONIC, CONTINUOUS USE OF OPIOIDS: ICD-10-CM

## 2025-03-19 DIAGNOSIS — G90.522 COMPLEX REGIONAL PAIN SYNDROME TYPE 1 OF LEFT LOWER EXTREMITY: ICD-10-CM

## 2025-03-19 DIAGNOSIS — M47.816 SPONDYLOSIS OF LUMBAR REGION WITHOUT MYELOPATHY OR RADICULOPATHY: ICD-10-CM

## 2025-03-19 RX ORDER — HYDROCODONE BITARTRATE AND ACETAMINOPHEN 5; 325 MG/1; MG/1
1 TABLET ORAL EVERY 6 HOURS PRN
Qty: 30 TABLET | Refills: 0 | Status: SHIPPED | OUTPATIENT
Start: 2025-03-19

## 2025-04-20 ENCOUNTER — MYC REFILL (OUTPATIENT)
Dept: FAMILY MEDICINE | Facility: CLINIC | Age: 65
End: 2025-04-20
Payer: COMMERCIAL

## 2025-04-20 DIAGNOSIS — M47.816 SPONDYLOSIS OF LUMBAR REGION WITHOUT MYELOPATHY OR RADICULOPATHY: ICD-10-CM

## 2025-04-20 DIAGNOSIS — G90.522 COMPLEX REGIONAL PAIN SYNDROME TYPE 1 OF LEFT LOWER EXTREMITY: ICD-10-CM

## 2025-04-20 DIAGNOSIS — F11.90 CHRONIC, CONTINUOUS USE OF OPIOIDS: ICD-10-CM

## 2025-04-22 RX ORDER — HYDROCODONE BITARTRATE AND ACETAMINOPHEN 5; 325 MG/1; MG/1
1 TABLET ORAL EVERY 6 HOURS PRN
Qty: 30 TABLET | Refills: 0 | Status: SHIPPED | OUTPATIENT
Start: 2025-04-22

## 2025-06-19 ASSESSMENT — PAIN SCALES - PAIN ENJOYMENT GENERAL ACTIVITY SCALE (PEG)
PEG_TOTALSCORE: 5.67
INTERFERED_ENJOYMENT_LIFE: 5
INTERFERED_GENERAL_ACTIVITY: 5
AVG_PAIN_PASTWEEK: 7

## 2025-06-24 ENCOUNTER — LAB (OUTPATIENT)
Dept: LAB | Facility: CLINIC | Age: 65
End: 2025-06-24
Payer: COMMERCIAL

## 2025-06-24 ENCOUNTER — OFFICE VISIT (OUTPATIENT)
Dept: PALLIATIVE MEDICINE | Facility: CLINIC | Age: 65
End: 2025-06-24
Payer: COMMERCIAL

## 2025-06-24 VITALS — DIASTOLIC BLOOD PRESSURE: 71 MMHG | SYSTOLIC BLOOD PRESSURE: 119 MMHG | HEART RATE: 71 BPM

## 2025-06-24 DIAGNOSIS — M79.18 MYOFASCIAL PAIN: ICD-10-CM

## 2025-06-24 DIAGNOSIS — G89.29 CHRONIC RIGHT SHOULDER PAIN: ICD-10-CM

## 2025-06-24 DIAGNOSIS — Z13.0 SCREENING FOR ENDOCRINE, METABOLIC AND IMMUNITY DISORDER: ICD-10-CM

## 2025-06-24 DIAGNOSIS — G90.522 COMPLEX REGIONAL PAIN SYNDROME TYPE 1 OF LEFT LOWER EXTREMITY: Primary | ICD-10-CM

## 2025-06-24 DIAGNOSIS — Z51.81 MEDICATION MONITORING ENCOUNTER: ICD-10-CM

## 2025-06-24 DIAGNOSIS — M54.59 LUMBAR FACET JOINT PAIN: ICD-10-CM

## 2025-06-24 DIAGNOSIS — Z13.228 SCREENING FOR ENDOCRINE, METABOLIC AND IMMUNITY DISORDER: ICD-10-CM

## 2025-06-24 DIAGNOSIS — M62.838 MUSCLE SPASM: ICD-10-CM

## 2025-06-24 DIAGNOSIS — M54.50 CHRONIC BILATERAL LOW BACK PAIN WITHOUT SCIATICA: ICD-10-CM

## 2025-06-24 DIAGNOSIS — M25.511 CHRONIC RIGHT SHOULDER PAIN: ICD-10-CM

## 2025-06-24 DIAGNOSIS — E78.5 HYPERLIPIDEMIA LDL GOAL <130: ICD-10-CM

## 2025-06-24 DIAGNOSIS — Z13.29 SCREENING FOR ENDOCRINE, METABOLIC AND IMMUNITY DISORDER: ICD-10-CM

## 2025-06-24 DIAGNOSIS — G89.29 CHRONIC BILATERAL LOW BACK PAIN WITHOUT SCIATICA: ICD-10-CM

## 2025-06-24 DIAGNOSIS — M79.672 LEFT FOOT PAIN: ICD-10-CM

## 2025-06-24 DIAGNOSIS — Z12.5 SCREENING FOR PROSTATE CANCER: ICD-10-CM

## 2025-06-24 PROCEDURE — 80061 LIPID PANEL: CPT

## 2025-06-24 PROCEDURE — 3078F DIAST BP <80 MM HG: CPT | Performed by: NURSE PRACTITIONER

## 2025-06-24 PROCEDURE — 80048 BASIC METABOLIC PNL TOTAL CA: CPT

## 2025-06-24 PROCEDURE — 99214 OFFICE O/P EST MOD 30 MIN: CPT | Performed by: NURSE PRACTITIONER

## 2025-06-24 PROCEDURE — G0103 PSA SCREENING: HCPCS

## 2025-06-24 PROCEDURE — 3074F SYST BP LT 130 MM HG: CPT | Performed by: NURSE PRACTITIONER

## 2025-06-24 PROCEDURE — G2211 COMPLEX E/M VISIT ADD ON: HCPCS | Performed by: NURSE PRACTITIONER

## 2025-06-24 PROCEDURE — 36415 COLL VENOUS BLD VENIPUNCTURE: CPT

## 2025-06-24 RX ORDER — METHOCARBAMOL 750 MG/1
750-1500 TABLET, FILM COATED ORAL 4 TIMES DAILY PRN
Qty: 120 TABLET | Refills: 5 | Status: SHIPPED | OUTPATIENT
Start: 2025-06-24

## 2025-06-24 NOTE — PATIENT INSTRUCTIONS
Plan:   Physical Therapy: not at present time, keep up your exercise program at home  Clinical Health Psychologist to address issues of relaxation, behavioral change, coping style, and other factors important to improvement: none  Diagnostic Studies: none  Medication Management:   OK to use OTC THC gummies  Continue methocarbamol for muscle spasms  I think that your current use of Norco is appropriate. #30 tabs for a month is not something I would be worried about. Would keep this with your Primary Care Provider.   Since this is less helpful over time, you could do opiate rotation to oxycodone 5mg  tabs #30 per month  If that is not helpful, then consider low dose buprenorphine which is a long-acting opiate that you wear as a patch and change every 7 days (Butrans 5mcg/hr) or buccal film you use inside the cheek every 12 hours (Belbuca 75mcg every 12 hours)  Further procedures recommended: none at present   Recommendations/follow-up for PCP:  see above  Release of information: none  Follow up: yearly or earlier if needed     ----------------------------------------------------------------  Clinic Number:  [phone]   Call with any questions about your care and for scheduling assistance.   Calls are returned Monday through Friday between 8 AM and 4:30 PM. We usually get back to you within 2 business days depending on the issue/request.    If we are prescribing your medications:  For opioid medication refills, call the clinic or send a Silent [employer] message 7 days in advance.  Please include:  Name of requested medication  Name of the pharmacy.  For non-opioid medications, call your pharmacy directly to request a refill. Please allow 3-4 days to be processed.   Per [place] State Law:  All controlled substance prescriptions must be filled within 30 days of being written.    For those controlled substances allowing refills, pickup must occur within 30 days of last fill.      We believe regular attendance is becker to your success  in our program!    Any time you are unable to keep your appointment we ask that you call us at least 24 hours in advance to cancel.This will allow us to offer the appointment time to another patient.   Multiple missed appointments may lead to dismissal from the clinic.
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
## 2025-06-25 ENCOUNTER — RESULTS FOLLOW-UP (OUTPATIENT)
Dept: FAMILY MEDICINE | Facility: CLINIC | Age: 65
End: 2025-06-25
Payer: COMMERCIAL

## 2025-06-25 LAB
ANION GAP SERPL CALCULATED.3IONS-SCNC: 10 MMOL/L (ref 7–15)
BUN SERPL-MCNC: 13.8 MG/DL (ref 8–23)
CALCIUM SERPL-MCNC: 9.5 MG/DL (ref 8.8–10.4)
CHLORIDE SERPL-SCNC: 105 MMOL/L (ref 98–107)
CHOLEST SERPL-MCNC: 101 MG/DL
CREAT SERPL-MCNC: 0.79 MG/DL (ref 0.67–1.17)
CREAT UR-MCNC: 74 MG/DL
EGFRCR SERPLBLD CKD-EPI 2021: >90 ML/MIN/1.73M2
FASTING STATUS PATIENT QL REPORTED: YES
FASTING STATUS PATIENT QL REPORTED: YES
GLUCOSE SERPL-MCNC: 116 MG/DL (ref 70–99)
HCO3 SERPL-SCNC: 26 MMOL/L (ref 22–29)
HDLC SERPL-MCNC: 35 MG/DL
LDLC SERPL CALC-MCNC: 39 MG/DL
NONHDLC SERPL-MCNC: 66 MG/DL
POTASSIUM SERPL-SCNC: 4.8 MMOL/L (ref 3.4–5.3)
PSA SERPL DL<=0.01 NG/ML-MCNC: 3.61 NG/ML (ref 0–4.5)
SODIUM SERPL-SCNC: 141 MMOL/L (ref 135–145)
TRIGL SERPL-MCNC: 135 MG/DL

## 2025-08-11 ENCOUNTER — TELEPHONE (OUTPATIENT)
Dept: FAMILY MEDICINE | Facility: CLINIC | Age: 65
End: 2025-08-11
Payer: COMMERCIAL

## 2025-08-12 ENCOUNTER — TELEPHONE (OUTPATIENT)
Dept: PALLIATIVE MEDICINE | Facility: CLINIC | Age: 65
End: 2025-08-12
Payer: COMMERCIAL